# Patient Record
Sex: FEMALE | Race: WHITE | NOT HISPANIC OR LATINO | Employment: FULL TIME | URBAN - METROPOLITAN AREA
[De-identification: names, ages, dates, MRNs, and addresses within clinical notes are randomized per-mention and may not be internally consistent; named-entity substitution may affect disease eponyms.]

---

## 2023-03-07 ENCOUNTER — CLINICAL SUPPORT (OUTPATIENT)
Dept: PHARMACY | Facility: HOSPITAL | Age: 63
End: 2023-03-07
Payer: COMMERCIAL

## 2023-03-07 DIAGNOSIS — E11.9 DIABETES MELLITUS TYPE 2 WITHOUT RETINOPATHY (MULTI): Primary | ICD-10-CM

## 2023-03-07 PROBLEM — G25.81 RESTLESS LEGS SYNDROME: Status: ACTIVE | Noted: 2023-03-07

## 2023-03-07 PROBLEM — D83.9 COMMON VARIABLE IMMUNODEFICIENCY (MULTI): Status: ACTIVE | Noted: 2023-03-07

## 2023-03-07 PROBLEM — J32.9 CHRONIC SINUSITIS: Status: ACTIVE | Noted: 2023-03-07

## 2023-03-07 PROBLEM — R92.8 ABNORMAL MAMMOGRAM: Status: ACTIVE | Noted: 2023-03-07

## 2023-03-07 PROBLEM — G51.32 HEMIFACIAL SPASM OF LEFT SIDE OF FACE: Status: ACTIVE | Noted: 2023-03-07

## 2023-03-07 PROBLEM — H52.4 BILATERAL PRESBYOPIA: Status: ACTIVE | Noted: 2023-03-07

## 2023-03-07 PROBLEM — R42 DIZZINESS: Status: ACTIVE | Noted: 2023-03-07

## 2023-03-07 PROBLEM — M19.90 ARTHRITIS: Status: ACTIVE | Noted: 2023-03-07

## 2023-03-07 PROBLEM — D23.30 BENIGN NEOPLASM OF SKIN OF FACE: Status: ACTIVE | Noted: 2023-03-07

## 2023-03-07 PROBLEM — G93.32 CHRONIC FATIGUE SYNDROME: Status: ACTIVE | Noted: 2023-03-07

## 2023-03-07 PROBLEM — E53.8 VITAMIN B12 DEFICIENCY: Status: ACTIVE | Noted: 2023-03-07

## 2023-03-07 PROBLEM — Z79.52 CURRENT CHRONIC USE OF SYSTEMIC STEROIDS: Status: ACTIVE | Noted: 2023-03-07

## 2023-03-07 PROBLEM — E55.9 VITAMIN D DEFICIENCY, UNSPECIFIED: Status: ACTIVE | Noted: 2023-03-07

## 2023-03-07 PROBLEM — H52.203 ASTIGMATISM, BILATERAL: Status: ACTIVE | Noted: 2023-03-07

## 2023-03-07 PROBLEM — H25.13 NUCLEAR SCLEROSIS OF BOTH EYES: Status: ACTIVE | Noted: 2023-03-07

## 2023-03-07 PROBLEM — N63.0 LUMP OR MASS IN BREAST: Status: ACTIVE | Noted: 2023-03-07

## 2023-03-07 PROBLEM — H93.13 BILATERAL TINNITUS: Status: ACTIVE | Noted: 2023-03-07

## 2023-03-07 PROBLEM — Z98.84 BARIATRIC SURGERY STATUS: Status: ACTIVE | Noted: 2023-03-07

## 2023-03-07 PROBLEM — K21.9 GERD (GASTROESOPHAGEAL REFLUX DISEASE): Status: ACTIVE | Noted: 2023-03-07

## 2023-03-07 PROBLEM — J45.909 ASTHMA (HHS-HCC): Status: ACTIVE | Noted: 2023-03-07

## 2023-03-07 PROBLEM — R73.9 HYPERGLYCEMIA, DRUG-INDUCED: Status: ACTIVE | Noted: 2023-03-07

## 2023-03-07 PROBLEM — E03.9 HYPOTHYROID: Status: ACTIVE | Noted: 2023-03-07

## 2023-03-07 PROBLEM — E78.2 COMBINED HYPERLIPIDEMIA: Status: ACTIVE | Noted: 2023-03-07

## 2023-03-07 PROBLEM — H02.831 DERMATOCHALASIS OF RIGHT UPPER EYELID: Status: ACTIVE | Noted: 2023-03-07

## 2023-03-07 PROBLEM — G47.30 SLEEP APNEA: Status: ACTIVE | Noted: 2023-03-07

## 2023-03-07 PROBLEM — H90.3 BILATERAL SENSORINEURAL HEARING LOSS: Status: ACTIVE | Noted: 2023-03-07

## 2023-03-07 PROBLEM — T50.905A HYPERGLYCEMIA, DRUG-INDUCED: Status: ACTIVE | Noted: 2023-03-07

## 2023-03-07 PROBLEM — R01.1 HEART MURMUR: Status: ACTIVE | Noted: 2023-03-07

## 2023-03-07 PROBLEM — G47.00 INSOMNIA: Status: ACTIVE | Noted: 2023-03-07

## 2023-03-07 RX ORDER — PEN NEEDLE, DIABETIC 32GX 5/32"
1 NEEDLE, DISPOSABLE MISCELLANEOUS
COMMUNITY
Start: 2022-12-29 | End: 2023-05-23 | Stop reason: SDUPTHER

## 2023-03-07 RX ORDER — IMMUNE GLOBULIN (HUMAN) 10 G/100ML
0.6 INJECTION INTRAVENOUS; SUBCUTANEOUS
COMMUNITY
Start: 2023-02-15 | End: 2023-05-23 | Stop reason: SINTOL

## 2023-03-07 RX ORDER — FLUTICASONE FUROATE, UMECLIDINIUM BROMIDE AND VILANTEROL TRIFENATATE 200; 62.5; 25 UG/1; UG/1; UG/1
1 POWDER RESPIRATORY (INHALATION) DAILY
COMMUNITY
End: 2024-02-07 | Stop reason: SDUPTHER

## 2023-03-07 RX ORDER — EPINEPHRINE 0.3 MG/.3ML
1 INJECTION SUBCUTANEOUS AS NEEDED
COMMUNITY
Start: 2016-08-31 | End: 2024-02-07 | Stop reason: WASHOUT

## 2023-03-07 RX ORDER — METFORMIN HYDROCHLORIDE 1000 MG/1
1000 TABLET ORAL
COMMUNITY
Start: 2022-03-21 | End: 2023-05-23 | Stop reason: SDUPTHER

## 2023-03-07 RX ORDER — OLOPATADINE HYDROCHLORIDE 2 MG/ML
1 SOLUTION/ DROPS OPHTHALMIC DAILY PRN
COMMUNITY
Start: 2021-05-15 | End: 2024-02-07 | Stop reason: WASHOUT

## 2023-03-07 RX ORDER — CHOLECALCIFEROL (VITAMIN D3) 25 MCG
25 TABLET ORAL DAILY
COMMUNITY
Start: 2017-08-12

## 2023-03-07 RX ORDER — DULAGLUTIDE 1.5 MG/.5ML
1.5 INJECTION, SOLUTION SUBCUTANEOUS
COMMUNITY
Start: 2022-03-21 | End: 2023-05-23 | Stop reason: ALTCHOICE

## 2023-03-07 RX ORDER — LEVOTHYROXINE SODIUM 150 UG/1
150 TABLET ORAL DAILY
COMMUNITY
Start: 2015-03-21 | End: 2023-05-23 | Stop reason: SDUPTHER

## 2023-03-07 RX ORDER — ALBUTEROL SULFATE 90 UG/1
1-2 AEROSOL, METERED RESPIRATORY (INHALATION) EVERY 4 HOURS PRN
COMMUNITY
Start: 2014-09-12 | End: 2024-02-07 | Stop reason: SDUPTHER

## 2023-03-07 RX ORDER — BLOOD SUGAR DIAGNOSTIC
1 STRIP MISCELLANEOUS 3 TIMES DAILY
COMMUNITY
Start: 2022-03-21 | End: 2023-05-24 | Stop reason: SDUPTHER

## 2023-03-07 RX ORDER — MEPOLIZUMAB 100 MG/ML
100 INJECTION, SOLUTION SUBCUTANEOUS
COMMUNITY
Start: 2023-02-10 | End: 2024-02-07 | Stop reason: WASHOUT

## 2023-03-07 RX ORDER — GABAPENTIN 100 MG/1
100 CAPSULE ORAL 3 TIMES DAILY
COMMUNITY
Start: 2021-06-03 | End: 2024-01-24 | Stop reason: WASHOUT

## 2023-03-07 RX ORDER — INSULIN ASPART 100 [IU]/ML
6-18 INJECTION, SOLUTION INTRAVENOUS; SUBCUTANEOUS
COMMUNITY
Start: 2020-06-29 | End: 2023-05-23 | Stop reason: SDUPTHER

## 2023-03-07 RX ORDER — TACROLIMUS 0.3 MG/G
1 OINTMENT TOPICAL 2 TIMES DAILY PRN
COMMUNITY
Start: 2021-05-15 | End: 2023-05-23 | Stop reason: ALTCHOICE

## 2023-03-07 RX ORDER — ONABOTULINUMTOXINA 100 [USP'U]/1
28 INJECTION, POWDER, LYOPHILIZED, FOR SOLUTION INTRADERMAL; INTRAMUSCULAR
COMMUNITY
Start: 2022-03-16 | End: 2024-02-07 | Stop reason: SDUPTHER

## 2023-03-07 RX ORDER — ESOMEPRAZOLE MAGNESIUM 40 MG/1
40 CAPSULE, DELAYED RELEASE ORAL
COMMUNITY
Start: 2016-01-18 | End: 2023-05-23 | Stop reason: SDUPTHER

## 2023-03-07 RX ORDER — IBUPROFEN 800 MG/1
800 TABLET ORAL 2 TIMES DAILY PRN
COMMUNITY
Start: 2014-02-04 | End: 2024-03-18 | Stop reason: SDUPTHER

## 2023-03-07 ASSESSMENT — ENCOUNTER SYMPTOMS: POLYDIPSIA: 1

## 2023-05-02 ENCOUNTER — PATIENT OUTREACH (OUTPATIENT)
Dept: CARE COORDINATION | Facility: CLINIC | Age: 63
End: 2023-05-02
Payer: COMMERCIAL

## 2023-05-10 LAB
ANION GAP IN SER/PLAS: 16 MMOL/L (ref 10–20)
CALCIUM (MG/DL) IN SER/PLAS: 9.1 MG/DL (ref 8.6–10.3)
CARBON DIOXIDE, TOTAL (MMOL/L) IN SER/PLAS: 22 MMOL/L (ref 21–32)
CHLORIDE (MMOL/L) IN SER/PLAS: 103 MMOL/L (ref 98–107)
CREATININE (MG/DL) IN SER/PLAS: 0.75 MG/DL (ref 0.5–1.05)
D-DIMER, QUANTITATIVE VTE EXCLUSION: 512 NG/ML FEU
GFR FEMALE: 88 ML/MIN/1.73M2
GLUCOSE (MG/DL) IN SER/PLAS: 117 MG/DL (ref 74–99)
POTASSIUM (MMOL/L) IN SER/PLAS: 4.1 MMOL/L (ref 3.5–5.3)
SODIUM (MMOL/L) IN SER/PLAS: 137 MMOL/L (ref 136–145)
UREA NITROGEN (MG/DL) IN SER/PLAS: 12 MG/DL (ref 6–23)

## 2023-05-15 ENCOUNTER — APPOINTMENT (OUTPATIENT)
Dept: PRIMARY CARE | Facility: CLINIC | Age: 63
End: 2023-05-15
Payer: COMMERCIAL

## 2023-05-23 ENCOUNTER — OFFICE VISIT (OUTPATIENT)
Dept: PRIMARY CARE | Facility: CLINIC | Age: 63
End: 2023-05-23
Payer: COMMERCIAL

## 2023-05-23 VITALS
HEIGHT: 67 IN | WEIGHT: 293 LBS | DIASTOLIC BLOOD PRESSURE: 72 MMHG | BODY MASS INDEX: 45.99 KG/M2 | HEART RATE: 82 BPM | TEMPERATURE: 97.5 F | OXYGEN SATURATION: 95 % | SYSTOLIC BLOOD PRESSURE: 122 MMHG

## 2023-05-23 DIAGNOSIS — E11.9 DIABETES MELLITUS TYPE 2 WITHOUT RETINOPATHY (MULTI): Primary | ICD-10-CM

## 2023-05-23 DIAGNOSIS — R06.00 DYSPNEA, UNSPECIFIED TYPE: ICD-10-CM

## 2023-05-23 DIAGNOSIS — E06.3 HYPOTHYROIDISM DUE TO HASHIMOTO'S THYROIDITIS: ICD-10-CM

## 2023-05-23 DIAGNOSIS — E03.8 HYPOTHYROIDISM DUE TO HASHIMOTO'S THYROIDITIS: ICD-10-CM

## 2023-05-23 DIAGNOSIS — K21.9 GASTROESOPHAGEAL REFLUX DISEASE WITHOUT ESOPHAGITIS: ICD-10-CM

## 2023-05-23 DIAGNOSIS — E66.01 CLASS 3 SEVERE OBESITY DUE TO EXCESS CALORIES WITH SERIOUS COMORBIDITY AND BODY MASS INDEX (BMI) OF 45.0 TO 49.9 IN ADULT (MULTI): ICD-10-CM

## 2023-05-23 PROBLEM — E66.813 CLASS 3 SEVERE OBESITY DUE TO EXCESS CALORIES WITH SERIOUS COMORBIDITY AND BODY MASS INDEX (BMI) OF 45.0 TO 49.9 IN ADULT: Status: ACTIVE | Noted: 2023-05-23

## 2023-05-23 LAB — THYROTROPIN (MIU/L) IN SER/PLAS BY DETECTION LIMIT <= 0.05 MIU/L: 3.77 MIU/L (ref 0.44–3.98)

## 2023-05-23 PROCEDURE — 3078F DIAST BP <80 MM HG: CPT | Performed by: INTERNAL MEDICINE

## 2023-05-23 PROCEDURE — 3008F BODY MASS INDEX DOCD: CPT | Performed by: INTERNAL MEDICINE

## 2023-05-23 PROCEDURE — 84443 ASSAY THYROID STIM HORMONE: CPT

## 2023-05-23 PROCEDURE — 3044F HG A1C LEVEL LT 7.0%: CPT | Performed by: INTERNAL MEDICINE

## 2023-05-23 PROCEDURE — 3074F SYST BP LT 130 MM HG: CPT | Performed by: INTERNAL MEDICINE

## 2023-05-23 PROCEDURE — 1159F MED LIST DOCD IN RCRD: CPT | Performed by: INTERNAL MEDICINE

## 2023-05-23 PROCEDURE — 99214 OFFICE O/P EST MOD 30 MIN: CPT | Performed by: INTERNAL MEDICINE

## 2023-05-23 RX ORDER — MONTELUKAST SODIUM 10 MG/1
TABLET ORAL
COMMUNITY
Start: 2023-03-13 | End: 2024-01-24 | Stop reason: SDUPTHER

## 2023-05-23 RX ORDER — PEN NEEDLE, DIABETIC 32GX 5/32"
1 NEEDLE, DISPOSABLE MISCELLANEOUS
Qty: 100 EACH | Refills: 1 | Status: SHIPPED | OUTPATIENT
Start: 2023-05-23 | End: 2023-05-25 | Stop reason: SDUPTHER

## 2023-05-23 RX ORDER — SULFAMETHOXAZOLE AND TRIMETHOPRIM 800; 160 MG/1; MG/1
TABLET ORAL
COMMUNITY
Start: 2023-04-28 | End: 2024-01-24 | Stop reason: SDUPTHER

## 2023-05-23 RX ORDER — METFORMIN HYDROCHLORIDE 1000 MG/1
1000 TABLET ORAL
Qty: 180 TABLET | Refills: 3 | Status: SHIPPED | OUTPATIENT
Start: 2023-05-23 | End: 2023-05-23

## 2023-05-23 RX ORDER — TIRZEPATIDE 2.5 MG/.5ML
2.5 INJECTION, SOLUTION SUBCUTANEOUS
Qty: 2 ML | Refills: 0 | Status: SHIPPED | OUTPATIENT
Start: 2023-05-23 | End: 2023-06-15 | Stop reason: ALTCHOICE

## 2023-05-23 RX ORDER — RIZATRIPTAN BENZOATE 10 MG/1
TABLET ORAL
COMMUNITY
Start: 2022-12-02 | End: 2024-02-07 | Stop reason: WASHOUT

## 2023-05-23 RX ORDER — GLUCOSAM/CHONDRO/HERB 149/HYAL 750-100 MG
TABLET ORAL
COMMUNITY
End: 2024-01-24 | Stop reason: SDUPTHER

## 2023-05-23 RX ORDER — AZELASTINE 1 MG/ML
SPRAY, METERED NASAL
COMMUNITY
Start: 2023-04-24 | End: 2023-11-09 | Stop reason: SDUPTHER

## 2023-05-23 RX ORDER — LEVOTHYROXINE SODIUM 150 UG/1
150 TABLET ORAL DAILY
Qty: 90 TABLET | Refills: 3 | Status: SHIPPED | OUTPATIENT
Start: 2023-05-23 | End: 2024-02-07 | Stop reason: WASHOUT

## 2023-05-23 RX ORDER — ESOMEPRAZOLE MAGNESIUM 40 MG/1
40 CAPSULE, DELAYED RELEASE ORAL
Qty: 90 CAPSULE | Refills: 3 | Status: SHIPPED | OUTPATIENT
Start: 2023-05-23 | End: 2023-05-23

## 2023-05-23 RX ORDER — INSULIN ASPART 100 [IU]/ML
6-18 INJECTION, SOLUTION INTRAVENOUS; SUBCUTANEOUS
Qty: 10 ML | Refills: 3 | Status: SHIPPED | OUTPATIENT
Start: 2023-05-23 | End: 2023-05-23

## 2023-05-23 NOTE — PROGRESS NOTES
Subjective   Lara Morris is a 63 y.o. female who presents for Hospital Follow-up.  HPI  Last seen 4/29/2021    Past medical history includes CVID, obesity, hypothyroidism, asthma, hypertension, osteoarthritis and diabetes.    Interim:  -Admitted to [facility], 4/28/2023 through 5/1/2023 due to anaphylaxis from IVIG  -Dealing with persistent dyspnea since approximately June 2021, possibly worsened after RSV about, treated by her immunologist for asthma increasing her medications but without improvement.  Seen by Dr. Wilkinson 5/10/2023 who ordered several tests including lab work, CT PE study which was negative but did show elevated right hemidiaphragm, sniff test suggesting possible diaphragmatic weakness.  She is still pending TTE, PFTs and 6-minute walk test.  Was also referred to Dr. Torres for evaluation of diaphragmatic weakness.    Refer to initial visit regarding diagnosis of diabetes; diagnosed by her immunologist, Dr. Emilie Richey. She has CVID on IVIG; since around 2010. Due to side effects, she requires pretreatment steroids (Solumedrol) along with Benadryl and Zofran to tolerate the infusions.  Recent anaphylactic reaction, unclear with the plan moving forward will be.    Has significant hyperglycemia with steroids, she has been doing well with corrective scale insulin for 2 or 3 days after treatment every 3 weeks. Most she has needed his 8 units.  Initial hemoglobin A1c was over 9%, started on metformin at first visit, tolerating well.   We also started her on statin for hyperlipidemia but atorvastatin 20 mg was not tolerated due to severe cramping and spasms in the legs, nor was rosuvastatin 5 mg even at every other day dosing.  She has gained 20 pounds since last visit.    **COPIED FORWARD FOR REFERENCE**     She also reports experiencing abdominal pain. She has had evaluation with gastroenterology in the past. Alternating bowel habits. Some lower abdominal discomfort especially with constipation.  "Has also had epigastric discomfort which is better off of NSAIDs. History of inguinal and ventral hernia repairs, as well as sigmoid diverticulitis. There has been question of Crohn's disease due to ileitis on endoscopy. Dr. Swenson suspected it may have been related to CVID rather than IBD. EGD and colonoscopy the 04/24/2019 were unremarkable other than gastropathy and TA and HPs. Repeat colonoscopy in 3 years due to poor prep. Recommended to perform capsule endoscopy, not completed. Pain with BM possibly due to severe sigmoid diverticulosis, aggressive constipation treatment was discussed at initial visit and symptoms have improved.     She lives with her partner Comfort and 2 dogs. She does have 2 daughters.  She works for  [employer].   Nonsmoker, no drug use, social alcohol use.     Providers:  Immunology-Dr. Emilie Richey  Rheumatology-Dr. Estrada  GI- Dr. Tiffany Love (no longer with )   Pulmonary: Dr. Gorge Wilkinson  Ophthalmology: Dr. Hanson  Neurology: Dr. Richie Tristan    Objective   /72   Pulse 82   Temp 36.4 °C (97.5 °F)   Ht 1.702 m (5' 7\")   Wt 136 kg (299 lb)   SpO2 95%   BMI 46.83 kg/m²    Physical Exam  Gen: NAD, pleasant, A&;Ox3  HEENT: PERRL, EOMI, MMM, OP clear  Neck: supple, no thyromegaly, no JVD, normal carotid upstroke  Pulm: lungs CTAB, no wheezes, slightly decreased air movement  CV: RRR, holosystolic murmur, midsternal, prominent P2, 2+ DP pulses  Abd: NABS, soft, NT, ND no HSM  Ext: no peripheral edema  Neuro: CN II-XII intact, no focal sensory or motor deficits, normal reflexes     Assessment/Plan   Problem List Items Addressed This Visit    None  Holosystolic murmur: Chronic, TTE 9/4/2020 shows normal EF, normal valve function, atrial septal aneurysm, pending repeat     Metabolic syndrome: NIDDM/HTN/HLD/Obesity  - Continue metformin 1000 mg twice daily  -Trial of Mounjaro  -Sliding-scale insulin for glucose control with use of steroids  -Statin not tolerated even " at very low dose (rosuva 5mg QOD); monitor closely, consider Zetia, PCSK9 inhibitors, etc.  -Blood pressure is controlled off lisinopril, even small dose causing symptoms of hypotension  -Recommend annual podiatry and eye exam    Hypothyroidism: Continues on levothyroxine, no TSH checked since last visit over 2 years ago, recheck today    Dyspnea: In the process of work-up as per HPI     CVID and asthma: managed by Dr. Emilie Richey as per history of present illness    **COPIED FORWARD FOR REFERENCE**     Abdominal discomfort: History of gastritis, chronic NSAID use, abdominal surgery and diverticulosis; likely all contributing, continue current regimen and aggressive control of constipation     Health maintenance  -Mammogram: 9/1/2020  -Pap: History of hysterectomy, unclear if indicated  -DEXA: If not done should check baseline given steroid use  -Last colonoscopy: 04/22/2019 , tubular adenoma Repeat: 3 years (poor prep)  -Smoking history: Never  -Counseled regarding diet and exercise  -Immunizations: Per Dr. Emilie Richey  -Followup in 3-6 months, sooner if needed         Chan Hernandez MD

## 2023-05-24 ENCOUNTER — PATIENT MESSAGE (OUTPATIENT)
Dept: PRIMARY CARE | Facility: CLINIC | Age: 63
End: 2023-05-24
Payer: COMMERCIAL

## 2023-05-24 DIAGNOSIS — E11.9 DIABETES MELLITUS TYPE 2 WITHOUT RETINOPATHY (MULTI): Primary | ICD-10-CM

## 2023-05-24 RX ORDER — BLOOD SUGAR DIAGNOSTIC
1 STRIP MISCELLANEOUS 3 TIMES DAILY
Qty: 100 STRIP | Refills: 3 | Status: SHIPPED | OUTPATIENT
Start: 2023-05-24 | End: 2023-05-25 | Stop reason: SDUPTHER

## 2023-05-25 DIAGNOSIS — E11.9 DIABETES MELLITUS TYPE 2 WITHOUT RETINOPATHY (MULTI): ICD-10-CM

## 2023-05-25 RX ORDER — BLOOD SUGAR DIAGNOSTIC
1 STRIP MISCELLANEOUS 3 TIMES DAILY
Qty: 300 STRIP | Refills: 3 | Status: SHIPPED | OUTPATIENT
Start: 2023-05-25 | End: 2024-02-07 | Stop reason: SDUPTHER

## 2023-05-25 RX ORDER — PEN NEEDLE, DIABETIC 32GX 5/32"
1 NEEDLE, DISPOSABLE MISCELLANEOUS
Qty: 300 EACH | Refills: 3 | Status: SHIPPED | OUTPATIENT
Start: 2023-05-25 | End: 2024-02-07 | Stop reason: SDUPTHER

## 2023-06-15 ENCOUNTER — TELEPHONE (OUTPATIENT)
Dept: PRIMARY CARE | Facility: CLINIC | Age: 63
End: 2023-06-15
Payer: COMMERCIAL

## 2023-06-15 DIAGNOSIS — E11.9 DIABETES MELLITUS TYPE 2 WITHOUT RETINOPATHY (MULTI): Primary | ICD-10-CM

## 2023-06-15 RX ORDER — TIRZEPATIDE 5 MG/.5ML
5 INJECTION, SOLUTION SUBCUTANEOUS
Qty: 2 ML | Refills: 3 | Status: SHIPPED | OUTPATIENT
Start: 2023-06-15 | End: 2023-08-23 | Stop reason: ALTCHOICE

## 2023-06-15 NOTE — TELEPHONE ENCOUNTER
Pt has 1 injection left of the Mounjaro 2.5mg. She is asking if you can send in a new rx for the next dosage

## 2023-06-21 ENCOUNTER — PATIENT OUTREACH (OUTPATIENT)
Dept: CARE COORDINATION | Facility: CLINIC | Age: 63
End: 2023-06-21
Payer: COMMERCIAL

## 2023-07-21 LAB
ALANINE AMINOTRANSFERASE (SGPT) (U/L) IN SER/PLAS: 31 U/L (ref 7–45)
ALBUMIN (G/DL) IN SER/PLAS: 4.1 G/DL (ref 3.4–5)
ALKALINE PHOSPHATASE (U/L) IN SER/PLAS: 76 U/L (ref 33–136)
ANION GAP IN SER/PLAS: 15 MMOL/L (ref 10–20)
ASPARTATE AMINOTRANSFERASE (SGOT) (U/L) IN SER/PLAS: 27 U/L (ref 9–39)
BASOPHILS (10*3/UL) IN BLOOD BY AUTOMATED COUNT: 0.05 X10E9/L (ref 0–0.1)
BASOPHILS/100 LEUKOCYTES IN BLOOD BY AUTOMATED COUNT: 0.6 % (ref 0–2)
BILIRUBIN TOTAL (MG/DL) IN SER/PLAS: 0.5 MG/DL (ref 0–1.2)
CALCIUM (MG/DL) IN SER/PLAS: 9.4 MG/DL (ref 8.6–10.3)
CARBON DIOXIDE, TOTAL (MMOL/L) IN SER/PLAS: 23 MMOL/L (ref 21–32)
CHLORIDE (MMOL/L) IN SER/PLAS: 105 MMOL/L (ref 98–107)
CREATININE (MG/DL) IN SER/PLAS: 0.8 MG/DL (ref 0.5–1.05)
EOSINOPHILS (10*3/UL) IN BLOOD BY AUTOMATED COUNT: 0.09 X10E9/L (ref 0–0.7)
EOSINOPHILS/100 LEUKOCYTES IN BLOOD BY AUTOMATED COUNT: 1.1 % (ref 0–6)
ERYTHROCYTE DISTRIBUTION WIDTH (RATIO) BY AUTOMATED COUNT: 14.6 % (ref 11.5–14.5)
ERYTHROCYTE MEAN CORPUSCULAR HEMOGLOBIN CONCENTRATION (G/DL) BY AUTOMATED: 31.8 G/DL (ref 32–36)
ERYTHROCYTE MEAN CORPUSCULAR VOLUME (FL) BY AUTOMATED COUNT: 83 FL (ref 80–100)
ERYTHROCYTES (10*6/UL) IN BLOOD BY AUTOMATED COUNT: 4.38 X10E12/L (ref 4–5.2)
GFR FEMALE: 82 ML/MIN/1.73M2
GLUCOSE (MG/DL) IN SER/PLAS: 83 MG/DL (ref 74–99)
HEMATOCRIT (%) IN BLOOD BY AUTOMATED COUNT: 36.2 % (ref 36–46)
HEMOGLOBIN (G/DL) IN BLOOD: 11.5 G/DL (ref 12–16)
IMMATURE GRANULOCYTES/100 LEUKOCYTES IN BLOOD BY AUTOMATED COUNT: 0.4 % (ref 0–0.9)
LEUKOCYTES (10*3/UL) IN BLOOD BY AUTOMATED COUNT: 8.5 X10E9/L (ref 4.4–11.3)
LYMPHOCYTES (10*3/UL) IN BLOOD BY AUTOMATED COUNT: 1.97 X10E9/L (ref 1.2–4.8)
LYMPHOCYTES/100 LEUKOCYTES IN BLOOD BY AUTOMATED COUNT: 23.3 % (ref 13–44)
MONOCYTES (10*3/UL) IN BLOOD BY AUTOMATED COUNT: 0.49 X10E9/L (ref 0.1–1)
MONOCYTES/100 LEUKOCYTES IN BLOOD BY AUTOMATED COUNT: 5.8 % (ref 2–10)
NEUTROPHILS (10*3/UL) IN BLOOD BY AUTOMATED COUNT: 5.84 X10E9/L (ref 1.2–7.7)
NEUTROPHILS/100 LEUKOCYTES IN BLOOD BY AUTOMATED COUNT: 68.8 % (ref 40–80)
PLATELETS (10*3/UL) IN BLOOD AUTOMATED COUNT: 300 X10E9/L (ref 150–450)
POTASSIUM (MMOL/L) IN SER/PLAS: 4.1 MMOL/L (ref 3.5–5.3)
PROTEIN TOTAL: 6.6 G/DL (ref 6.4–8.2)
SEDIMENTATION RATE, ERYTHROCYTE: 33 MM/H (ref 0–30)
SODIUM (MMOL/L) IN SER/PLAS: 139 MMOL/L (ref 136–145)
UREA NITROGEN (MG/DL) IN SER/PLAS: 15 MG/DL (ref 6–23)

## 2023-07-22 LAB
IGA (MG/DL) IN SER/PLAS: 146 MG/DL (ref 70–400)
IGG (MG/DL) IN SER/PLAS: 551 MG/DL (ref 700–1600)
IGM (MG/DL) IN SER/PLAS: 152 MG/DL (ref 40–230)

## 2023-08-09 LAB
FERRITIN (UG/LL) IN SER/PLAS: 63 UG/L (ref 8–150)
IRON (UG/DL) IN SER/PLAS: 58 UG/DL (ref 35–150)
IRON BINDING CAPACITY (UG/DL) IN SER/PLAS: 459 UG/DL (ref 240–445)
IRON SATURATION (%) IN SER/PLAS: 13 % (ref 25–45)

## 2023-08-23 ENCOUNTER — OFFICE VISIT (OUTPATIENT)
Dept: PRIMARY CARE | Facility: CLINIC | Age: 63
End: 2023-08-23
Payer: COMMERCIAL

## 2023-08-23 VITALS
OXYGEN SATURATION: 98 % | DIASTOLIC BLOOD PRESSURE: 67 MMHG | WEIGHT: 267 LBS | HEIGHT: 67 IN | BODY MASS INDEX: 41.91 KG/M2 | TEMPERATURE: 97.9 F | HEART RATE: 78 BPM | SYSTOLIC BLOOD PRESSURE: 122 MMHG

## 2023-08-23 DIAGNOSIS — E11.9 DIABETES MELLITUS TYPE 2 WITHOUT RETINOPATHY (MULTI): ICD-10-CM

## 2023-08-23 DIAGNOSIS — G47.33 OBSTRUCTIVE SLEEP APNEA SYNDROME: Primary | ICD-10-CM

## 2023-08-23 DIAGNOSIS — J06.9 UPPER RESPIRATORY TRACT INFECTION, UNSPECIFIED TYPE: ICD-10-CM

## 2023-08-23 DIAGNOSIS — E66.01 CLASS 3 SEVERE OBESITY DUE TO EXCESS CALORIES WITH SERIOUS COMORBIDITY AND BODY MASS INDEX (BMI) OF 45.0 TO 49.9 IN ADULT (MULTI): ICD-10-CM

## 2023-08-23 PROBLEM — I35.0 AORTIC STENOSIS: Status: ACTIVE | Noted: 2023-08-23

## 2023-08-23 PROBLEM — J98.6 DIAPHRAGM DYSFUNCTION: Status: ACTIVE | Noted: 2023-08-23

## 2023-08-23 PROBLEM — J96.10 CHRONIC RESPIRATORY FAILURE (MULTI): Status: ACTIVE | Noted: 2023-08-23

## 2023-08-23 PROBLEM — R10.84 GENERALIZED ABDOMINAL PAIN: Status: ACTIVE | Noted: 2023-08-23

## 2023-08-23 PROCEDURE — 99215 OFFICE O/P EST HI 40 MIN: CPT | Performed by: INTERNAL MEDICINE

## 2023-08-23 PROCEDURE — 3078F DIAST BP <80 MM HG: CPT | Performed by: INTERNAL MEDICINE

## 2023-08-23 PROCEDURE — 3008F BODY MASS INDEX DOCD: CPT | Performed by: INTERNAL MEDICINE

## 2023-08-23 PROCEDURE — 1159F MED LIST DOCD IN RCRD: CPT | Performed by: INTERNAL MEDICINE

## 2023-08-23 PROCEDURE — 3044F HG A1C LEVEL LT 7.0%: CPT | Performed by: INTERNAL MEDICINE

## 2023-08-23 PROCEDURE — 1126F AMNT PAIN NOTED NONE PRSNT: CPT | Performed by: INTERNAL MEDICINE

## 2023-08-23 PROCEDURE — 3074F SYST BP LT 130 MM HG: CPT | Performed by: INTERNAL MEDICINE

## 2023-08-23 RX ORDER — CETIRIZINE HYDROCHLORIDE 10 MG/1
1 TABLET ORAL 2 TIMES DAILY
COMMUNITY
Start: 2023-06-29

## 2023-08-23 RX ORDER — AMOXICILLIN AND CLAVULANATE POTASSIUM 875; 125 MG/1; MG/1
875 TABLET, FILM COATED ORAL 2 TIMES DAILY
Qty: 28 TABLET | Refills: 0 | Status: SHIPPED | OUTPATIENT
Start: 2023-08-23 | End: 2023-09-06

## 2023-08-23 RX ORDER — EZETIMIBE 10 MG/1
1 TABLET ORAL DAILY
COMMUNITY
Start: 2022-04-20 | End: 2024-05-30 | Stop reason: SDUPTHER

## 2023-08-23 RX ORDER — PREDNISONE 10 MG/1
TABLET ORAL
Qty: 30 TABLET | Refills: 0 | Status: SHIPPED | OUTPATIENT
Start: 2023-08-23 | End: 2023-09-02

## 2023-08-23 RX ORDER — HUMAN IMMUNOGLOBULIN G 0.2 G/ML
LIQUID SUBCUTANEOUS
COMMUNITY
Start: 2023-08-01 | End: 2024-06-06 | Stop reason: SDUPTHER

## 2023-08-23 RX ORDER — TIRZEPATIDE 7.5 MG/.5ML
7.5 INJECTION, SOLUTION SUBCUTANEOUS
Qty: 2 ML | Refills: 2 | Status: SHIPPED | OUTPATIENT
Start: 2023-08-23 | End: 2023-08-23

## 2023-08-23 NOTE — PROGRESS NOTES
"Subjective   Lara Morris is a 63 y.o. female who presents for Follow-up and Sinusitis.  HPI  Last seen 4/29/2021    Past medical history includes CVID, obesity, hypothyroidism, asthma, hypertension, osteoarthritis and diabetes.    Interim:  -Normal PFTs, May 2023  -TTE showing mild aortic stenosis and diastolic dysfunction, June 2023  -Bilateral diaphragmatic pacing implantation, 6/19/2023  -Cardiology follow-up with Dr. Craft on 6/29/2023, recommended repeat echocardiogram in 1 year for aortic stenosis, restarting Zetia daily  -Postoperative follow-up 6/29/2023, apparently doing well  - Rheum follow-up, 8/8/23, given local injection triamcinolone and ordered plain films of the spine and hips  -Sleep medicine 8/9/2023, ordered home sleep study to confirm prior diagnosis and restart on CPAP.  Also assessed for RLS with recommendation to take Neurontin earlier and possibly increased dose as well as address iron levels if needed.    The diaphragmatic pacemaker has been \"life-altering\"; her exertional capacity has improved significantly.    Sick since Friday; runny nose, congestion, cough.  No fever.  No sick contacts.  Using OTC decongestant, expectorant in addition to her daily Zyrtec and Singulair.    Initial A1c > 9%, started on Metformin and CSI (for steroid coverage).  On Mounjaro since May 2023, no side effects.  Has really helped with controlling hunger.  Able to limit carbs more easily.  Has lost over 30lbs in 3 months!    We also started her on statin for hyperlipidemia but atorvastatin 20 mg was not tolerated due to severe cramping and spasms in the legs, nor was rosuvastatin 5 mg even at every other day dosing.  Started on Zetia and tolerating without side effects.         She lives with her partner Comfort and 2 dogs. She does have 2 daughters.  She works for  [employer].   Nonsmoker, no drug use, social alcohol use.     Providers:  Immunology-Dr. Emilie Richey  Surgery - Dr. Torres  Rheumatology-Dr. Ceballos " "Fabian  GI- Dr. Tiffany Love (no longer with )   Pulmonary: Dr. Gorge Wilkinson  Ophthalmology: Dr. Hanson  Neurology: Dr. Richie Tristan  Sleep: Dr. Wong    Objective   /67   Pulse 78   Temp 36.6 °C (97.9 °F)   Ht 1.702 m (5' 7\")   Wt 121 kg (267 lb)   SpO2 98%   BMI 41.82 kg/m²    Physical Exam  Gen: NAD, pleasant, A&;Ox3  HEENT: PERRL, EOMI, MMM, OP clear, TM clear  Neck: supple, no thyromegaly, no JVD, normal carotid upstroke  Pulm: lungs CTAB, mild basilar inspiratory wheezes especially at RLL  CV: RRR, early systolic murmur, midsternal, prominent P2, 2+ DP pulses  Abd: NABS, soft, NT, ND no HSM  Ext: no peripheral edema  Neuro: CN II-XII intact, no focal sensory or motor deficits, normal reflexes     Assessment/Plan     Acute URI: with immunosuppressed state (CVID); treat empirically with Augmentin for two weeks and steroid taper     Metabolic syndrome: NIDDM/HTN/HLD/Obesity  - Continue metformin 1000 mg twice daily  - increase Mounjaro to 7.5mg weekly (initial weight ~300lb)  -Sliding-scale insulin for glucose control with use of steroids  -Statin not tolerated even at very low dose (rosuva 5mg QOD); continue Zetia, recheck next visit  -Blood pressure is controlled off lisinopril, even small dose causing symptoms of hypotension  -Recommend annual podiatry and eye exam    Hypothyroidism:   -Continues on levothyroxine, 150mcg daily  - normal TSH 5/23/2023    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 Torres  - CTPE negative  - PFTs normal May 2023  - mild aortic stenosis and diastolic dysfunction, June 2023       Aortic stenosis, mild: repeat TTE in one year     CVID and asthma: managed by Dr. Emilie Richey, on Hizentra    JOSÉ MIGUEL/RLS: ordered HSAT as per sleep medicine, iron stores adequate      **COPIED FORWARD FOR REFERENCE**     Abdominal discomfort: History of gastritis, chronic NSAID use, abdominal surgery and diverticulosis; likely all contributing, " continue current regimen and aggressive control of constipation     Health maintenance  -Mammogram: 9/1/2020  -Pap: History of hysterectomy, unclear if indicated  -DEXA: If not done should check baseline given steroid use  -Last colonoscopy: 04/22/2019 , tubular adenoma Repeat: 3 years (poor prep)  -Smoking history: Never  -Counseled regarding diet and exercise  -Immunizations: Per Dr. Emilie Richey  -Followup in 3-6 months, sooner if needed         Chan Hernandez MD

## 2023-10-03 ENCOUNTER — PHARMACY VISIT (OUTPATIENT)
Dept: PHARMACY | Facility: CLINIC | Age: 63
End: 2023-10-03
Payer: COMMERCIAL

## 2023-10-03 PROCEDURE — RXMED WILLOW AMBULATORY MEDICATION CHARGE

## 2023-10-23 ENCOUNTER — PHARMACY VISIT (OUTPATIENT)
Dept: PHARMACY | Facility: CLINIC | Age: 63
End: 2023-10-23
Payer: COMMERCIAL

## 2023-10-23 PROCEDURE — RXMED WILLOW AMBULATORY MEDICATION CHARGE

## 2023-10-26 ENCOUNTER — PHARMACY VISIT (OUTPATIENT)
Dept: PHARMACY | Facility: CLINIC | Age: 63
End: 2023-10-26
Payer: COMMERCIAL

## 2023-10-26 PROCEDURE — RXMED WILLOW AMBULATORY MEDICATION CHARGE

## 2023-10-26 RX ORDER — PEN NEEDLE, DIABETIC 30 GX3/16"
1 NEEDLE, DISPOSABLE MISCELLANEOUS
Qty: 300 EACH | Refills: 3 | OUTPATIENT
Start: 2023-05-25

## 2023-10-26 RX ORDER — BLOOD SUGAR DIAGNOSTIC
1 STRIP MISCELLANEOUS 3 TIMES DAILY
Qty: 300 STRIP | Refills: 3 | OUTPATIENT
Start: 2023-10-26

## 2023-10-27 ENCOUNTER — SPECIALTY PHARMACY (OUTPATIENT)
Dept: PHARMACY | Facility: CLINIC | Age: 63
End: 2023-10-27

## 2023-10-27 ENCOUNTER — OFFICE VISIT (OUTPATIENT)
Dept: SURGERY | Facility: CLINIC | Age: 63
End: 2023-10-27
Payer: COMMERCIAL

## 2023-10-27 ENCOUNTER — PHARMACY VISIT (OUTPATIENT)
Dept: PHARMACY | Facility: CLINIC | Age: 63
End: 2023-10-27
Payer: COMMERCIAL

## 2023-10-27 ENCOUNTER — HOSPITAL ENCOUNTER (OUTPATIENT)
Dept: RADIOLOGY | Facility: HOSPITAL | Age: 63
Discharge: HOME | End: 2023-10-27
Payer: COMMERCIAL

## 2023-10-27 VITALS — HEART RATE: 76 BPM | DIASTOLIC BLOOD PRESSURE: 66 MMHG | SYSTOLIC BLOOD PRESSURE: 110 MMHG | TEMPERATURE: 96.5 F

## 2023-10-27 DIAGNOSIS — J98.6 DIAPHRAGM DYSFUNCTION: ICD-10-CM

## 2023-10-27 DIAGNOSIS — J98.6 DIAPHRAGM DYSFUNCTION: Primary | ICD-10-CM

## 2023-10-27 DIAGNOSIS — J31.0 CHRONIC RHINITIS: Primary | ICD-10-CM

## 2023-10-27 PROCEDURE — 1126F AMNT PAIN NOTED NONE PRSNT: CPT | Performed by: NURSE PRACTITIONER

## 2023-10-27 PROCEDURE — 71046 X-RAY EXAM CHEST 2 VIEWS: CPT | Performed by: RADIOLOGY

## 2023-10-27 PROCEDURE — RXMED WILLOW AMBULATORY MEDICATION CHARGE

## 2023-10-27 PROCEDURE — 99214 OFFICE O/P EST MOD 30 MIN: CPT | Performed by: NURSE PRACTITIONER

## 2023-10-27 PROCEDURE — 3074F SYST BP LT 130 MM HG: CPT | Performed by: NURSE PRACTITIONER

## 2023-10-27 PROCEDURE — 1159F MED LIST DOCD IN RCRD: CPT | Performed by: NURSE PRACTITIONER

## 2023-10-27 PROCEDURE — 1036F TOBACCO NON-USER: CPT | Performed by: NURSE PRACTITIONER

## 2023-10-27 PROCEDURE — 3044F HG A1C LEVEL LT 7.0%: CPT | Performed by: NURSE PRACTITIONER

## 2023-10-27 PROCEDURE — 3008F BODY MASS INDEX DOCD: CPT | Performed by: NURSE PRACTITIONER

## 2023-10-27 PROCEDURE — 71046 X-RAY EXAM CHEST 2 VIEWS: CPT | Mod: FY

## 2023-10-27 PROCEDURE — 3078F DIAST BP <80 MM HG: CPT | Performed by: NURSE PRACTITIONER

## 2023-10-27 PROCEDURE — 71046 X-RAY EXAM CHEST 2 VIEWS: CPT

## 2023-10-27 NOTE — PROGRESS NOTES
Subjective   Patient ID: Lara Morris is a 63 y.o. female.    HPI Lara is now 4 months post DP implant for right HD paralysis. Since implant she reports feeling significantly better with symptoms. She said she was able to ride her bike for several miles and is able to walk, etc. She is not yet at baseline but is much improved.   She has skin breakdown from tape over abd. The connector fung is not an issue it is tape she is using to secure the cable.     Review of Systems    Objective   Physical Exam  Constitutional:       Appearance: Normal appearance. She is obese.   Pulmonary:      Effort: Pulmonary effort is normal.   Abdominal:      Comments: Obese, wire sites intact, area or rash/breakdown on abd from tape. Superficial, no drainage, but skin red.    Skin:     General: Skin is warm and dry.   Neurological:      Mental Status: She is alert.         Assessment/Plan   63 yr old with right HD dysfunction who has been pacing full time since implant. She has some improvement on EMG since implant. The epochs are very very small with regular breathing, smaller than left with deep inspiration.   CXR done today and no significant change from pre op.   The pacer power was increased on the right.   She will continue to pace full time and we will see back in 4 months.     Diagnoses and all orders for this visit:  Diaphragm dysfunction  -     XR chest 2 views; Future

## 2023-10-31 PROBLEM — R06.00 DYSPNEA: Status: ACTIVE | Noted: 2023-10-31

## 2023-10-31 PROBLEM — M17.9 OSTEOARTHRITIS OF KNEE: Status: ACTIVE | Noted: 2023-10-31

## 2023-10-31 PROBLEM — M19.079 PRIMARY LOCALIZED OSTEOARTHROSIS OF ANKLE AND FOOT: Status: ACTIVE | Noted: 2020-01-23

## 2023-10-31 PROBLEM — H57.813 BROW PTOSIS, BILATERAL: Status: ACTIVE | Noted: 2023-10-31

## 2023-10-31 PROBLEM — M72.2 PLANTAR FASCIITIS: Status: ACTIVE | Noted: 2020-01-23

## 2023-10-31 PROBLEM — M76.829 POSTERIOR TIBIAL TENDON DYSFUNCTION: Status: ACTIVE | Noted: 2020-01-23

## 2023-10-31 PROBLEM — H02.89 STEATOBLEPHARON: Status: ACTIVE | Noted: 2023-10-31

## 2023-10-31 PROBLEM — M21.6X9 PRONATION OF FOOT: Status: ACTIVE | Noted: 2020-01-23

## 2023-10-31 PROBLEM — E61.1 IRON DEFICIENCY: Status: ACTIVE | Noted: 2023-10-31

## 2023-10-31 RX ORDER — ELETRIPTAN HYDROBROMIDE 40 MG/1
40 TABLET, FILM COATED ORAL ONCE AS NEEDED
COMMUNITY
End: 2024-02-07 | Stop reason: WASHOUT

## 2023-10-31 RX ORDER — TRIAMCINOLONE ACETONIDE 0.25 MG/G
OINTMENT TOPICAL
COMMUNITY
Start: 2023-06-09 | End: 2024-02-07 | Stop reason: WASHOUT

## 2023-10-31 RX ORDER — FLUTICASONE PROPIONATE AND SALMETEROL 250; 50 UG/1; UG/1
POWDER RESPIRATORY (INHALATION)
COMMUNITY
End: 2024-02-07 | Stop reason: WASHOUT

## 2023-10-31 RX ORDER — GINKGO BILOBA LEAF EXTRACT 60 MG
100 CAPSULE ORAL DAILY
COMMUNITY
Start: 2023-06-29

## 2023-10-31 RX ORDER — FLUTICASONE FUROATE 100 UG/1
POWDER RESPIRATORY (INHALATION)
COMMUNITY
Start: 2022-12-02 | End: 2024-02-07 | Stop reason: WASHOUT

## 2023-10-31 RX ORDER — FLUTICASONE PROPIONATE 50 MCG
2 SPRAY, SUSPENSION (ML) NASAL DAILY
COMMUNITY

## 2023-10-31 RX ORDER — HYOSCYAMINE SULFATE 0.12 MG/1
TABLET, ORALLY DISINTEGRATING ORAL
COMMUNITY
End: 2024-02-07 | Stop reason: WASHOUT

## 2023-10-31 RX ORDER — POTASSIUM &MAGNESIUM ASPARTATE 250-250 MG
2 CAPSULE ORAL DAILY
COMMUNITY
Start: 2023-06-29

## 2023-10-31 RX ORDER — TRAMADOL HYDROCHLORIDE 50 MG/1
TABLET ORAL
COMMUNITY
End: 2024-02-07 | Stop reason: WASHOUT

## 2023-10-31 RX ORDER — LANOLIN ALCOHOL/MO/W.PET/CERES
1 CREAM (GRAM) TOPICAL DAILY
COMMUNITY
End: 2024-02-07 | Stop reason: WASHOUT

## 2023-10-31 RX ORDER — SCOLOPAMINE TRANSDERMAL SYSTEM 1 MG/1
1 PATCH, EXTENDED RELEASE TRANSDERMAL
COMMUNITY
End: 2024-02-07 | Stop reason: WASHOUT

## 2023-10-31 RX ORDER — TRIAMCINOLONE ACETONIDE 1 MG/G
OINTMENT TOPICAL 2 TIMES DAILY
COMMUNITY
End: 2024-02-07 | Stop reason: WASHOUT

## 2023-10-31 RX ORDER — ONDANSETRON HYDROCHLORIDE 8 MG/1
8 TABLET, FILM COATED ORAL EVERY 8 HOURS PRN
COMMUNITY
End: 2024-03-20 | Stop reason: WASHOUT

## 2023-10-31 RX ORDER — NAPROXEN SODIUM 220 MG/1
1 TABLET ORAL DAILY
COMMUNITY
Start: 2023-06-29

## 2023-10-31 RX ORDER — LISINOPRIL 10 MG/1
TABLET ORAL
COMMUNITY
End: 2024-02-07 | Stop reason: WASHOUT

## 2023-10-31 RX ORDER — ACETAMINOPHEN 325 MG/1
650 TABLET ORAL EVERY 6 HOURS PRN
COMMUNITY
Start: 2023-06-20

## 2023-10-31 RX ORDER — BENZONATATE 200 MG/1
CAPSULE ORAL
COMMUNITY
End: 2024-02-07 | Stop reason: WASHOUT

## 2023-10-31 RX ORDER — HYDROXYZINE HYDROCHLORIDE 25 MG/1
TABLET, FILM COATED ORAL
COMMUNITY
End: 2024-02-07 | Stop reason: WASHOUT

## 2023-10-31 RX ORDER — MAGNESIUM HYDROXIDE 400 MG/5ML
1 SUSPENSION, ORAL (FINAL DOSE FORM) ORAL DAILY
COMMUNITY
End: 2024-01-24 | Stop reason: SDUPTHER

## 2023-10-31 RX ORDER — IPRATROPIUM BROMIDE AND ALBUTEROL SULFATE 2.5; .5 MG/3ML; MG/3ML
3 SOLUTION RESPIRATORY (INHALATION) 4 TIMES DAILY PRN
COMMUNITY

## 2023-11-01 ENCOUNTER — CLINICAL SUPPORT (OUTPATIENT)
Dept: SLEEP MEDICINE | Facility: CLINIC | Age: 63
End: 2023-11-01
Payer: COMMERCIAL

## 2023-11-01 DIAGNOSIS — G47.33 OBSTRUCTIVE SLEEP APNEA SYNDROME: ICD-10-CM

## 2023-11-01 PROCEDURE — 95806 SLEEP STUDY UNATT&RESP EFFT: CPT | Performed by: INTERNAL MEDICINE

## 2023-11-01 NOTE — PROGRESS NOTES
Type of Study: HOME SLEEP STUDY - NOMAD     The patient received equipment and instructions for use of the [facility] Nom[facility] HSAT device. The patient was instructed how to apply the effort belts, cannula, thermistor. It was also explained how the Nomad and oximeter components work.  The patient was asked to record their sleep for an 8-hour period.     The patient was informed of their responsibility for the device and acknowledged this by signing the HSAT device contract. The patient was asked to return the device on 11/3/2023 between the hours of [id number] to the [facility].     The patient was instructed to call 911 as usual for any medical- emergencies while at home.  The patient was also given a phone number for troubleshooting when using the device in case there were additional questions.

## 2023-11-02 ENCOUNTER — HOME INFUSION (OUTPATIENT)
Dept: INFUSION THERAPY | Age: 63
End: 2023-11-02
Payer: COMMERCIAL

## 2023-11-02 NOTE — PROGRESS NOTES
Review of chart... Pt with order for Hizentra 20gm weekly... Order is good thru 7/28/24... Auth is good thru 7/24/24... FOTM for Nov has been checked...    Pt will need next dose for 11/3/23... She has requested that we get her delivery ready for her to  tomorrow when she is in the office by 2 pm    Processed fill for 8 x 10gm Hizentra vials for pt  11/3/23 to cover doses 11/3, 11/10, 11/17, and 11/24/23.    FU 11/27/23 check next delivery date and add to Dec FOTM list as needed

## 2023-11-03 ENCOUNTER — PHARMACY VISIT (OUTPATIENT)
Dept: PHARMACY | Facility: CLINIC | Age: 63
End: 2023-11-03
Payer: COMMERCIAL

## 2023-11-03 ENCOUNTER — DOCUMENTATION (OUTPATIENT)
Dept: INFUSION THERAPY | Age: 63
End: 2023-11-03
Payer: COMMERCIAL

## 2023-11-03 ENCOUNTER — HOME INFUSION (OUTPATIENT)
Dept: INFUSION THERAPY | Age: 63
End: 2023-11-03
Payer: COMMERCIAL

## 2023-11-03 RX ORDER — LIDOCAINE AND PRILOCAINE 25; 25 MG/G; MG/G
CREAM TOPICAL
Qty: 60 G | Refills: 6 | OUTPATIENT
Start: 2023-11-03

## 2023-11-03 NOTE — PROGRESS NOTES
Message left with Dr Richey's per pt request to have Rx for EMLA cream sent to [facility] infusion pharmacy for use prior to Hizentra infusions.     Pt was given 1200 ml/hr tubing in addition to 900 ml/hr with this delivery to see if this will speed up infusion time. Check with pt next delivery to determine pt preference.

## 2023-11-03 NOTE — PROGRESS NOTES
Patient's medication and supplies will be available for  by 2pm today.     Sending supplies for subcutaneous administration of 4 doses of Hizentra qweekly via [place] 60 pump. Including all tubing and syringes necessary, etc.     Pt can talk to [facility] at time of  if questions

## 2023-11-07 ENCOUNTER — HOME INFUSION (OUTPATIENT)
Dept: INFUSION THERAPY | Age: 63
End: 2023-11-07
Payer: COMMERCIAL

## 2023-11-07 ENCOUNTER — PATIENT MESSAGE (OUTPATIENT)
Dept: PRIMARY CARE | Facility: CLINIC | Age: 63
End: 2023-11-07
Payer: COMMERCIAL

## 2023-11-07 DIAGNOSIS — G47.33 OBSTRUCTIVE SLEEP APNEA SYNDROME: Primary | ICD-10-CM

## 2023-11-07 NOTE — PROGRESS NOTES
Pt had requested tube of EMLA to be used prior to Hiznetra infusions. Rx sent to  [facility]. [facility] pulled order from [facility] and sent to ins for ins check. No charge to pt at this time.    Pharmacy to dispense the following 11/8:  1x 30 gm tube of EMLA  DOS 11/9-12/7    Pharmacy check each month if pt needs    Pt to  from pharmacy - do not deliver to pt

## 2023-11-08 ENCOUNTER — PHARMACY VISIT (OUTPATIENT)
Dept: PHARMACY | Facility: CLINIC | Age: 63
End: 2023-11-08
Payer: COMMERCIAL

## 2023-11-08 PROCEDURE — RXMED WILLOW AMBULATORY MEDICATION CHARGE

## 2023-11-09 ENCOUNTER — PHARMACY VISIT (OUTPATIENT)
Dept: PHARMACY | Facility: CLINIC | Age: 63
End: 2023-11-09
Payer: COMMERCIAL

## 2023-11-09 PROCEDURE — RXMED WILLOW AMBULATORY MEDICATION CHARGE

## 2023-11-09 RX ORDER — AZELASTINE 1 MG/ML
SPRAY, METERED NASAL
Qty: 90 ML | Refills: 3 | Status: SHIPPED | OUTPATIENT
Start: 2023-11-09 | End: 2024-11-07

## 2023-11-14 ENCOUNTER — PHARMACY VISIT (OUTPATIENT)
Dept: PHARMACY | Facility: CLINIC | Age: 63
End: 2023-11-14
Payer: COMMERCIAL

## 2023-11-14 PROCEDURE — RXMED WILLOW AMBULATORY MEDICATION CHARGE

## 2023-11-15 ENCOUNTER — OFFICE VISIT (OUTPATIENT)
Dept: PRIMARY CARE | Facility: CLINIC | Age: 63
End: 2023-11-15
Payer: COMMERCIAL

## 2023-11-15 VITALS
BODY MASS INDEX: 39.94 KG/M2 | TEMPERATURE: 97.3 F | OXYGEN SATURATION: 97 % | HEART RATE: 69 BPM | DIASTOLIC BLOOD PRESSURE: 70 MMHG | WEIGHT: 255 LBS | SYSTOLIC BLOOD PRESSURE: 112 MMHG

## 2023-11-15 DIAGNOSIS — E66.01 CLASS 3 SEVERE OBESITY DUE TO EXCESS CALORIES WITH SERIOUS COMORBIDITY AND BODY MASS INDEX (BMI) OF 40.0 TO 44.9 IN ADULT (MULTI): Primary | ICD-10-CM

## 2023-11-15 DIAGNOSIS — J96.10 CHRONIC RESPIRATORY FAILURE, UNSPECIFIED WHETHER WITH HYPOXIA OR HYPERCAPNIA (MULTI): ICD-10-CM

## 2023-11-15 DIAGNOSIS — E78.2 COMBINED HYPERLIPIDEMIA: ICD-10-CM

## 2023-11-15 DIAGNOSIS — J47.9 BRONCHIECTASIS, UNCOMPLICATED (MULTI): ICD-10-CM

## 2023-11-15 DIAGNOSIS — D83.9 COMMON VARIABLE IMMUNODEFICIENCY, UNSPECIFIED (MULTI): ICD-10-CM

## 2023-11-15 DIAGNOSIS — E11.9 DIABETES MELLITUS TYPE 2 WITHOUT RETINOPATHY (MULTI): ICD-10-CM

## 2023-11-15 LAB — HBA1C MFR BLD: 5.5 % (ref 4.2–6.5)

## 2023-11-15 PROCEDURE — 3078F DIAST BP <80 MM HG: CPT | Performed by: INTERNAL MEDICINE

## 2023-11-15 PROCEDURE — 1126F AMNT PAIN NOTED NONE PRSNT: CPT | Performed by: INTERNAL MEDICINE

## 2023-11-15 PROCEDURE — 3008F BODY MASS INDEX DOCD: CPT | Performed by: INTERNAL MEDICINE

## 2023-11-15 PROCEDURE — 99214 OFFICE O/P EST MOD 30 MIN: CPT | Performed by: INTERNAL MEDICINE

## 2023-11-15 PROCEDURE — 83036 HEMOGLOBIN GLYCOSYLATED A1C: CPT | Mod: CLIA WAIVED TEST | Performed by: INTERNAL MEDICINE

## 2023-11-15 PROCEDURE — 1159F MED LIST DOCD IN RCRD: CPT | Performed by: INTERNAL MEDICINE

## 2023-11-15 PROCEDURE — 1036F TOBACCO NON-USER: CPT | Performed by: INTERNAL MEDICINE

## 2023-11-15 PROCEDURE — 3044F HG A1C LEVEL LT 7.0%: CPT | Performed by: INTERNAL MEDICINE

## 2023-11-15 PROCEDURE — 4010F ACE/ARB THERAPY RXD/TAKEN: CPT | Performed by: INTERNAL MEDICINE

## 2023-11-15 PROCEDURE — 3074F SYST BP LT 130 MM HG: CPT | Performed by: INTERNAL MEDICINE

## 2023-11-15 NOTE — PROGRESS NOTES
Subjective   Lara Morris is a 63 y.o. female who presents for Follow-up.  HPI  Last seen 4/29/2021    Past medical history includes CVID, obesity, hypothyroidism, asthma, hypertension, osteoarthritis and diabetes.    Initial A1c > 9%, started on Metformin and CSI (for steroid coverage).  Has not required any insulin in some time now.  On Mounjaro since May 2023, no side effects.  Has really helped with controlling hunger.  Able to limit carbs more easily.  Lost over 30lbs in 3 months of initiation and another 12lbs since last visit.  Started at ~300lb (5/2023) --> 255 (11/15/2023).    We also started her on statin for hyperlipidemia but atorvastatin 20 mg was not tolerated due to severe cramping and spasms in the legs, nor was rosuvastatin 5 mg even at every other day dosing.  Started on Zetia and tolerating without side effects.         She lives with her partner Comfort and 2 dogs. She does have 2 daughters.  She works for  [employer].   Nonsmoker, no drug use, social alcohol use.     Providers:  Immunology-Dr. Emilei Richey  Surgery - Dr. Torres  Rheumatology-Dr. Moreno- Dr. Tiffany Love (no longer with )   Pulmonary: Dr. Gorge Wilkinson  Ophthalmology: Dr. Hanson  Neurology: Dr. Richie Tristan  Sleep: Dr. Wong    Objective   /70   Pulse 69   Temp 36.3 °C (97.3 °F)   Wt 116 kg (255 lb)   SpO2 97%   BMI 39.94 kg/m²    Physical Exam  Gen: NAD, pleasant, A&;Ox3  HEENT: PERRL, EOMI, MMM, OP clear, TM clear  Neck: supple, no thyromegaly, no JVD, normal carotid upstroke  Pulm: lungs CTAB, mild basilar inspiratory wheezes especially at RLL  CV: RRR, early systolic murmur, midsternal, prominent P2, 2+ DP pulses  Abd: NABS, soft, NT, ND no HSM  Ext: no peripheral edema  Neuro: CN II-XII intact, no focal sensory or motor deficits, normal reflexes     Assessment/Plan     Metabolic syndrome: NIDDM/HTN/HLD/Obesity, A1c 5.5%  - Continue metformin 1000 mg decreasing to once daily  -Continue Mounjaro  7.5mg weekly (initial weight ~300lb)  -Sliding-scale insulin for glucose control with use of steroids if needed, has been sometime since there was any concern  -Statin not tolerated even at very low dose (rosuva 5mg QOD); continue Zetia, recheck next visit  -Blood pressure is controlled off lisinopril, even small dose causing symptoms of hypotension  -Recommend annual podiatry and eye exam    Hypothyroidism:   -Continues on levothyroxine, 150mcg daily  - normal TSH 5/23/2023    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 Torres/team  - CTPE negative  - PFTs normal May 2023  - mild aortic stenosis and diastolic dysfunction, June 2023; repeat TTE in one year       CVID and asthma: managed by Dr. Emilie Richey, on Hizentra    JOSÉ MIGUEL/RLS: Moderate with hypoxia on HSAT 11/1/2023, iron stores adequate  -Recommended CPAP/BiPAP titration study which is pending      **COPIED FORWARD FOR REFERENCE**     Abdominal discomfort: History of gastritis, chronic NSAID use, abdominal surgery and diverticulosis; likely all contributing, continue current regimen and aggressive control of constipation     Health maintenance  -Mammogram: 9/1/2020  -Pap: History of hysterectomy, unclear if indicated  -DEXA: If not done should check baseline given steroid use  -Last colonoscopy: 04/22/2019 , tubular adenoma Repeat: 3 years (poor prep)  -Smoking history: Never  -Counseled regarding diet and exercise  -Immunizations: Per Dr. Emilie Richey  -Followup in 3-6 months, sooner if needed         Chan Hernandez MD

## 2023-11-16 ENCOUNTER — PHARMACY VISIT (OUTPATIENT)
Dept: PHARMACY | Facility: CLINIC | Age: 63
End: 2023-11-16
Payer: COMMERCIAL

## 2023-11-16 ENCOUNTER — SPECIALTY PHARMACY (OUTPATIENT)
Dept: PHARMACY | Facility: CLINIC | Age: 63
End: 2023-11-16

## 2023-11-16 PROCEDURE — RXMED WILLOW AMBULATORY MEDICATION CHARGE

## 2023-11-16 RX ORDER — MEPOLIZUMAB 100 MG/ML
100 INJECTION, SOLUTION SUBCUTANEOUS
Qty: 1 ML | Refills: 12 | OUTPATIENT
Start: 2023-11-16

## 2023-11-17 ENCOUNTER — PHARMACY VISIT (OUTPATIENT)
Dept: PHARMACY | Facility: CLINIC | Age: 63
End: 2023-11-17
Payer: COMMERCIAL

## 2023-11-17 PROCEDURE — RXMED WILLOW AMBULATORY MEDICATION CHARGE

## 2023-11-20 ENCOUNTER — PHARMACY VISIT (OUTPATIENT)
Dept: PHARMACY | Facility: CLINIC | Age: 63
End: 2023-11-20
Payer: COMMERCIAL

## 2023-11-20 PROCEDURE — RXMED WILLOW AMBULATORY MEDICATION CHARGE

## 2023-11-21 ENCOUNTER — CLINICAL SUPPORT (OUTPATIENT)
Dept: SLEEP MEDICINE | Facility: CLINIC | Age: 63
End: 2023-11-21
Payer: COMMERCIAL

## 2023-11-21 DIAGNOSIS — G47.33 OBSTRUCTIVE SLEEP APNEA SYNDROME: ICD-10-CM

## 2023-11-21 PROCEDURE — 95811 POLYSOM 6/>YRS CPAP 4/> PARM: CPT | Performed by: PSYCHIATRY & NEUROLOGY

## 2023-11-21 ASSESSMENT — SLEEP AND FATIGUE QUESTIONNAIRES: ESS TOTAL SCORE: 2

## 2023-11-21 ASSESSMENT — PAIN SCALES - GENERAL: PAINLEVEL: 0-NO PAIN

## 2023-11-22 VITALS
SYSTOLIC BLOOD PRESSURE: 135 MMHG | DIASTOLIC BLOOD PRESSURE: 88 MMHG | BODY MASS INDEX: 43.32 KG/M2 | WEIGHT: 276 LBS | OXYGEN SATURATION: 98 % | RESPIRATION RATE: 20 BRPM | HEIGHT: 67 IN

## 2023-11-22 ASSESSMENT — SLEEP AND FATIGUE QUESTIONNAIRES
SITTING AND RIDING IN A CAR OR BUS FOR ABOUT HALF AN HOUR: 0
LYING DOWN TO REST OR NAP IN THE AFTERNOON: 0
SITTING QUITELY BY YOURSELF AFTER LUNCH: 0
SITTING AND WATCHING TV OR A VIDEO: 1
ESS-CHAD TOTAL SCORE: 2
SITTING IN A CLASSROOM AT SCHOOL DURING THE MORNING: 0
SITTING AND EATING A MEAL: 0
SITTING AND TALKING TO SOMEONE: 0
SITTING AND READING: 1

## 2023-11-22 NOTE — PROGRESS NOTES
[facility] TECH NOTE:     Patient: Lara Morris   MRN//AGE: [id number]  1960  63 y.o.   Technologist: Kristy Lion   Room: 106   Service Date: 2023        Sleep Testing Location: [facility]: 2    TECHNOLOGIST SLEEP STUDY PROCEDURE NOTE:   This sleep study is being conducted according to the policies and procedures outlined by the AAS accreditation standards.  The sleep study procedure and processes involved during this appointment was explained to the patient/patient’s family, questions were answered. The patient/family verbalized understanding.      The patient is a 63 y.o. year old female scheduled for a CPAP titration.   She arrived for her appointment.      The study that was ultimately completed was a BPAP titration.     The full study was completed.  Patient questionnaires completed?: yes     Consents signed? yes    Initial Fall Risk Screening: Lara has not fallen in the last 6 months. Lara does not have a fear of falling. She does not need assistance with sitting, standing, or walking. She does not need assistance walking in her home. She does not need assistance in an unfamiliar setting. The patient is not using an assistive device.     Brief Study observations: 63 year old female presents for a CPAP titration study. Titration was started out at 6cmH2O, with a ResMed AirFit N20 mask (size medium).  Patient needed to sleep with the head of the bed elevated due to having a diaphragmatic pacemaker,  she cannot lay flat.  The head of the bed was elevated at 35 degrees.  Patient stated that she was having a hard time with the pressure of CPAP due to her diaphragmatic pacemaker, switched over to BiPAP starting at 8/4 cwp.  Patient woke up one time to use the restroom throughout the night.  REM was observed. Patient asked to be woken up at 0430 due to having to work in the morning, and lives more than an hour away from the lab.      Deviation to order/protocol and reason: None      If PAP, which was  preferred mask/pressure/mode: ResMed AirFit N20, size medium    Other:None    After the procedure, the patient/family was informed to ensure followup with ordering clinician for testing results.      Technologist: Kristy Lion

## 2023-11-24 PROBLEM — J47.9 BRONCHIECTASIS, UNCOMPLICATED (MULTI): Status: ACTIVE | Noted: 2023-11-24

## 2023-11-27 ENCOUNTER — SPECIALTY PHARMACY (OUTPATIENT)
Dept: PHARMACY | Facility: CLINIC | Age: 63
End: 2023-11-27

## 2023-11-27 ENCOUNTER — PHARMACY VISIT (OUTPATIENT)
Dept: PHARMACY | Facility: CLINIC | Age: 63
End: 2023-11-27
Payer: COMMERCIAL

## 2023-11-27 PROCEDURE — RXMED WILLOW AMBULATORY MEDICATION CHARGE

## 2023-11-28 ENCOUNTER — PHARMACY VISIT (OUTPATIENT)
Dept: PHARMACY | Facility: CLINIC | Age: 63
End: 2023-11-28
Payer: COMMERCIAL

## 2023-11-28 DIAGNOSIS — E66.01 CLASS 3 SEVERE OBESITY DUE TO EXCESS CALORIES WITH SERIOUS COMORBIDITY AND BODY MASS INDEX (BMI) OF 45.0 TO 49.9 IN ADULT (MULTI): ICD-10-CM

## 2023-11-28 DIAGNOSIS — E11.9 DIABETES MELLITUS TYPE 2 WITHOUT RETINOPATHY (MULTI): ICD-10-CM

## 2023-11-28 PROCEDURE — RXMED WILLOW AMBULATORY MEDICATION CHARGE

## 2023-11-28 RX ORDER — TIRZEPATIDE 7.5 MG/.5ML
INJECTION, SOLUTION SUBCUTANEOUS
Qty: 2 ML | Refills: 2 | Status: SHIPPED | OUTPATIENT
Start: 2023-11-28 | End: 2024-02-19 | Stop reason: SDUPTHER

## 2023-12-01 ENCOUNTER — HOME INFUSION (OUTPATIENT)
Dept: INFUSION THERAPY | Age: 63
End: 2023-12-01
Payer: COMMERCIAL

## 2023-12-01 ENCOUNTER — DOCUMENTATION (OUTPATIENT)
Dept: INFUSION THERAPY | Age: 63
End: 2023-12-01
Payer: COMMERCIAL

## 2023-12-01 NOTE — PROGRESS NOTES
Review of chart... Pt with order for Hizentra 20gm weekly... Order is good thru 7/28/24... Auth is good thru 7/24/24... FOTM for December has been checked but Pharmacy billing.     Pt will need next dose for 12/4/23... She has requested that we get her delivery ready for her to  Monday before 12pm when she is in the office for pickup.  EMLA requested this month. Rejected for refill too soon, but PAID CLAIM with Therapy Change Modifier due to need for monthly dispensing.     Processed fill for 8 x 10gm Hizentra vials ans 1 tube EMLA for 12/01/23 dispense and pt  12/4/23 to cover doses 12/4 through 12/31/23.     FU 12/21/23 check next delivery date and add to Jan24 FOTM list. OK to dispense last week of December with paid Pharmacy claim.

## 2023-12-01 NOTE — PROGRESS NOTES
Set up  for Monday 12/4. Patient will  in warehouse, or notify office staff of her arrival and will be given package. This was arranged with patient by [facility].     Sending stnd supplies for 4 doses of Hizentra administered subcutaneously via [place] 60 pump.

## 2023-12-04 ENCOUNTER — PROCEDURE VISIT (OUTPATIENT)
Dept: NEUROLOGY | Facility: CLINIC | Age: 63
End: 2023-12-04
Payer: COMMERCIAL

## 2023-12-04 VITALS — WEIGHT: 253 LBS | BODY MASS INDEX: 39.63 KG/M2

## 2023-12-04 DIAGNOSIS — G51.9 FACIAL NERVE DISORDER: ICD-10-CM

## 2023-12-04 DIAGNOSIS — G51.32 HEMIFACIAL SPASM OF LEFT SIDE OF FACE: Primary | ICD-10-CM

## 2023-12-04 PROCEDURE — 64612 DESTROY NERVE FACE MUSCLE: CPT | Performed by: PSYCHIATRY & NEUROLOGY

## 2023-12-04 NOTE — PROGRESS NOTES
Neurology Botulinum Injection    Subjective   Lara Morris is an 63 y.o. female here for medical botulinum injection for Hemifacial spasm of left side of face [G51.32].   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 Partially worn off.        Interval PMH: diaphragmatic pacemaker placed     PMHx   CVID , taking IVIG for 12 years   Asthma  osteoarthritis   B12 deficiency   bilateral hearing impairment   wears CPAP      PSHx  Bilateral knee replacement    take alcohol     Objective   Neurological Exam  Exam today: Minimal twitching around the eye and L lower face on exam today.  Physical Exam  Procedures    Prior to the start of the procedure a time out was taken and the following were verified: the identity of the patient using two patient identifiers (name, ) - this was verified by the patient      The area was prepped in the usual sterile fashion. Botulinum toxin type A (Botox)was injected as follows:     Dose Sites Muscle  5 units 1 left superomedial orbicularis ocularis   5 units 1 left superolateral orbicularis ocularis   5 units 1 left lateral orbicularis ocularis   5 units 1 left Inferior lateral orbicularis ocularis   5 units 1 left Inferior orbicularis ocularis   3 units 1 left zygomaticus   1 unit 1 left risorius (+1unit)  1 unit 1 left upper orbicularis oris (+1unit)     Patient supplied Botox.  Total botulinum toxin used was 30 units, 70 discarded, patient supplied 100 units     diagnosis: hemifacial spasm    Assessment/Plan   The procedure was well tolerated. The patient will return in approximately 3 months for follow up injection

## 2023-12-06 PROCEDURE — RXMED WILLOW AMBULATORY MEDICATION CHARGE

## 2023-12-07 ENCOUNTER — PHARMACY VISIT (OUTPATIENT)
Dept: PHARMACY | Facility: CLINIC | Age: 63
End: 2023-12-07
Payer: COMMERCIAL

## 2023-12-08 ENCOUNTER — LAB (OUTPATIENT)
Dept: LAB | Facility: LAB | Age: 63
End: 2023-12-08
Payer: COMMERCIAL

## 2023-12-08 DIAGNOSIS — D83.0 COMMON VARIABLE IMMUNODEFICIENCY WITH PREDOMINANT ABNORMALITIES OF B-CELL NUMBERS AND FUNCTION (MULTI): Primary | ICD-10-CM

## 2023-12-08 DIAGNOSIS — Z79.899 OTHER LONG TERM (CURRENT) DRUG THERAPY: ICD-10-CM

## 2023-12-08 DIAGNOSIS — D83.0 COMMON VARIABLE IMMUNODEFICIENCY WITH PREDOMINANT ABNORMALITIES OF B-CELL NUMBERS AND FUNCTION (MULTI): ICD-10-CM

## 2023-12-08 LAB
ALBUMIN SERPL BCP-MCNC: 3.7 G/DL (ref 3.4–5)
ALP SERPL-CCNC: 70 U/L (ref 33–136)
ALT SERPL W P-5'-P-CCNC: 13 U/L (ref 7–45)
ANION GAP SERPL CALC-SCNC: 10 MMOL/L (ref 10–20)
AST SERPL W P-5'-P-CCNC: 12 U/L (ref 9–39)
BASOPHILS # BLD AUTO: 0.04 X10*3/UL (ref 0–0.1)
BASOPHILS NFR BLD AUTO: 0.5 %
BILIRUB SERPL-MCNC: 0.4 MG/DL (ref 0–1.2)
BUN SERPL-MCNC: 23 MG/DL (ref 6–23)
CALCIUM SERPL-MCNC: 9.4 MG/DL (ref 8.6–10.6)
CHLORIDE SERPL-SCNC: 102 MMOL/L (ref 98–107)
CO2 SERPL-SCNC: 31 MMOL/L (ref 21–32)
CREAT SERPL-MCNC: 1.03 MG/DL (ref 0.5–1.05)
CRP SERPL-MCNC: 0.33 MG/DL
EOSINOPHIL # BLD AUTO: 0.05 X10*3/UL (ref 0–0.7)
EOSINOPHIL NFR BLD AUTO: 0.6 %
ERYTHROCYTE [DISTWIDTH] IN BLOOD BY AUTOMATED COUNT: 15.1 % (ref 11.5–14.5)
ERYTHROCYTE [SEDIMENTATION RATE] IN BLOOD BY WESTERGREN METHOD: 17 MM/H (ref 0–30)
GFR SERPL CREATININE-BSD FRML MDRD: 60 ML/MIN/1.73M*2
GLUCOSE SERPL-MCNC: 74 MG/DL (ref 74–99)
HCT VFR BLD AUTO: 37.1 % (ref 36–46)
HGB BLD-MCNC: 11.7 G/DL (ref 12–16)
IGA SERPL-MCNC: 166 MG/DL (ref 70–400)
IGG SERPL-MCNC: 961 MG/DL (ref 700–1600)
IGM SERPL-MCNC: 188 MG/DL (ref 40–230)
IMM GRANULOCYTES # BLD AUTO: 0.03 X10*3/UL (ref 0–0.7)
IMM GRANULOCYTES NFR BLD AUTO: 0.4 % (ref 0–0.9)
LYMPHOCYTES # BLD AUTO: 2.4 X10*3/UL (ref 1.2–4.8)
LYMPHOCYTES NFR BLD AUTO: 29.4 %
MCH RBC QN AUTO: 26.8 PG (ref 26–34)
MCHC RBC AUTO-ENTMCNC: 31.5 G/DL (ref 32–36)
MCV RBC AUTO: 85 FL (ref 80–100)
MONOCYTES # BLD AUTO: 0.44 X10*3/UL (ref 0.1–1)
MONOCYTES NFR BLD AUTO: 5.4 %
NEUTROPHILS # BLD AUTO: 5.21 X10*3/UL (ref 1.2–7.7)
NEUTROPHILS NFR BLD AUTO: 63.7 %
NRBC BLD-RTO: 0 /100 WBCS (ref 0–0)
PLATELET # BLD AUTO: 269 X10*3/UL (ref 150–450)
POTASSIUM SERPL-SCNC: 4.7 MMOL/L (ref 3.5–5.3)
PROT SERPL-MCNC: 6.6 G/DL (ref 6.4–8.2)
RBC # BLD AUTO: 4.37 X10*6/UL (ref 4–5.2)
SODIUM SERPL-SCNC: 138 MMOL/L (ref 136–145)
WBC # BLD AUTO: 8.2 X10*3/UL (ref 4.4–11.3)

## 2023-12-08 PROCEDURE — 82784 ASSAY IGA/IGD/IGG/IGM EACH: CPT

## 2023-12-08 PROCEDURE — 85652 RBC SED RATE AUTOMATED: CPT

## 2023-12-08 PROCEDURE — 80053 COMPREHEN METABOLIC PANEL: CPT

## 2023-12-08 PROCEDURE — 86140 C-REACTIVE PROTEIN: CPT

## 2023-12-08 PROCEDURE — 36415 COLL VENOUS BLD VENIPUNCTURE: CPT

## 2023-12-08 PROCEDURE — 85025 COMPLETE CBC W/AUTO DIFF WBC: CPT

## 2023-12-11 DIAGNOSIS — E11.9 DIABETES MELLITUS TYPE 2 WITHOUT RETINOPATHY (MULTI): ICD-10-CM

## 2023-12-11 NOTE — ADDENDUM NOTE
Addended by: JOVON AVILES on: 12/11/2023 12:45 PM     Modules accepted: Orders, Level of Service

## 2023-12-12 RX ORDER — INSULIN ASPART 100 [IU]/ML
INJECTION, SOLUTION INTRAVENOUS; SUBCUTANEOUS
Qty: 15 ML | Refills: 3 | OUTPATIENT
Start: 2023-12-12 | End: 2024-12-11

## 2023-12-13 ENCOUNTER — PHARMACY VISIT (OUTPATIENT)
Dept: PHARMACY | Facility: CLINIC | Age: 63
End: 2023-12-13
Payer: COMMERCIAL

## 2023-12-13 ENCOUNTER — LAB (OUTPATIENT)
Dept: LAB | Facility: LAB | Age: 63
End: 2023-12-13
Payer: COMMERCIAL

## 2023-12-13 DIAGNOSIS — D64.9 ANEMIA, UNSPECIFIED: ICD-10-CM

## 2023-12-13 DIAGNOSIS — D64.9 ANEMIA, UNSPECIFIED: Primary | ICD-10-CM

## 2023-12-13 LAB
BASOPHILS # BLD AUTO: 0.05 X10*3/UL (ref 0–0.1)
BASOPHILS NFR BLD AUTO: 0.5 %
EOSINOPHIL # BLD AUTO: 0.1 X10*3/UL (ref 0–0.7)
EOSINOPHIL NFR BLD AUTO: 1 %
ERYTHROCYTE [DISTWIDTH] IN BLOOD BY AUTOMATED COUNT: 15 % (ref 11.5–14.5)
HCT VFR BLD AUTO: 37.7 % (ref 36–46)
HGB BLD-MCNC: 11.8 G/DL (ref 12–16)
IMM GRANULOCYTES # BLD AUTO: 0.04 X10*3/UL (ref 0–0.7)
IMM GRANULOCYTES NFR BLD AUTO: 0.4 % (ref 0–0.9)
IRON SATN MFR SERPL: 9 % (ref 25–45)
IRON SERPL-MCNC: 44 UG/DL (ref 35–150)
LYMPHOCYTES # BLD AUTO: 3.25 X10*3/UL (ref 1.2–4.8)
LYMPHOCYTES NFR BLD AUTO: 31.3 %
MCH RBC QN AUTO: 26.4 PG (ref 26–34)
MCHC RBC AUTO-ENTMCNC: 31.3 G/DL (ref 32–36)
MCV RBC AUTO: 84 FL (ref 80–100)
MONOCYTES # BLD AUTO: 0.64 X10*3/UL (ref 0.1–1)
MONOCYTES NFR BLD AUTO: 6.2 %
NEUTROPHILS # BLD AUTO: 6.32 X10*3/UL (ref 1.2–7.7)
NEUTROPHILS NFR BLD AUTO: 60.6 %
NRBC BLD-RTO: 0 /100 WBCS (ref 0–0)
PLATELET # BLD AUTO: 290 X10*3/UL (ref 150–450)
RBC # BLD AUTO: 4.47 X10*6/UL (ref 4–5.2)
TIBC SERPL-MCNC: 472 UG/DL (ref 240–445)
UIBC SERPL-MCNC: 428 UG/DL (ref 110–370)
WBC # BLD AUTO: 10.4 X10*3/UL (ref 4.4–11.3)

## 2023-12-13 PROCEDURE — 83550 IRON BINDING TEST: CPT

## 2023-12-13 PROCEDURE — RXMED WILLOW AMBULATORY MEDICATION CHARGE

## 2023-12-13 PROCEDURE — 36415 COLL VENOUS BLD VENIPUNCTURE: CPT

## 2023-12-13 PROCEDURE — 85025 COMPLETE CBC W/AUTO DIFF WBC: CPT

## 2023-12-13 PROCEDURE — 83540 ASSAY OF IRON: CPT

## 2023-12-18 ENCOUNTER — HOME INFUSION (OUTPATIENT)
Dept: INFUSION THERAPY | Age: 63
End: 2023-12-18
Payer: COMMERCIAL

## 2023-12-18 PROCEDURE — RXMED WILLOW AMBULATORY MEDICATION CHARGE

## 2023-12-18 NOTE — PROGRESS NOTES
Review of chart... Pt with order for Hizentra 24gm weekly...Dose change confirmed with MD for goal of improved response of symptoms Order is good for 6 fills... Auth is good thru 7/24/24 and Trace directed [facility] to continue using... will use [facility] 05 override FOTM for December has been checked but Pharmacy billing.     Pt will need new dose for this week... patient requesting 6mm needle sets and Pharmacy working to obtain. Already sent EMLA this month.     Processed fill for 8 x 10gm Hizentra vials and 4 x 4Gm Hizentra for 12/18/23 dispense and pt  or delivery 12/19/23 (patient rep to coordinate with patient) to cover doses 12/20/23 through 1/16/24.     FU 1/10/23 check next delivery date. Get Inventory and see if patient wants to equal out vials for doses and needle size requested. Likely send EMLA. Copay assist should be involved with Trace. Dose needed around 01/17/23.

## 2023-12-19 ENCOUNTER — TELEPHONE (OUTPATIENT)
Dept: INFUSION THERAPY | Age: 63
End: 2023-12-19
Payer: COMMERCIAL

## 2023-12-19 ENCOUNTER — PHARMACY VISIT (OUTPATIENT)
Dept: PHARMACY | Facility: CLINIC | Age: 63
End: 2023-12-19
Payer: COMMERCIAL

## 2023-12-19 PROCEDURE — RXMED WILLOW AMBULATORY MEDICATION CHARGE

## 2023-12-19 NOTE — TELEPHONE ENCOUNTER
"Spoke with patient. Asked her if she could  her medication later than usual d/t to waiting on new needle set to arrive. Patient actually at home today so agreeable to delivery tonight between 6-9pm instead.  to call on the way. Deliver to patient's home address.       Sending drug and standard supplies for 4 doses of Hizentra delivered subcutaneously  via [place] 60 Pump  including new needle set of 0.6\" length.     Patient had no questions for pharmacist.          "

## 2023-12-19 NOTE — TELEPHONE ENCOUNTER
Spoke with patient to let her know new size needle set still had not come in and delivery would have to be delayed. She requested instead that we deliver everything else today and she would use what she has already and then we can send new needle sets separately once they come in.      Delivery will be tonight by 9 pm with the  to call ahead of time. Deliver to home address instead of patient  as usually done.     Sending drug, standard supplies for 4 dose of Hizentra delivered subcutaneously  via [place] [employer] .  Needle sets to come. Patient has some in the home.      Patient had no questions for pharmacist.

## 2023-12-20 ENCOUNTER — PHARMACY VISIT (OUTPATIENT)
Dept: PHARMACY | Facility: CLINIC | Age: 63
End: 2023-12-20

## 2023-12-20 ENCOUNTER — PHARMACY VISIT (OUTPATIENT)
Dept: PHARMACY | Facility: CLINIC | Age: 63
End: 2023-12-20
Payer: COMMERCIAL

## 2023-12-20 PROCEDURE — RXMED WILLOW AMBULATORY MEDICATION CHARGE

## 2023-12-20 NOTE — TELEPHONE ENCOUNTER
NEW NEEDLE SETS CAME IN THIS MORNING AND 2 SETS (PER PATIENT REQUEST) WERE DELIVERED DIRECTLY TO PATIENT AT THE OFFICE BY . TICKET WAS CREATED.

## 2023-12-21 ENCOUNTER — PHARMACY VISIT (OUTPATIENT)
Dept: PHARMACY | Facility: CLINIC | Age: 63
End: 2023-12-21
Payer: COMMERCIAL

## 2024-01-10 ENCOUNTER — HOME INFUSION (OUTPATIENT)
Dept: INFUSION THERAPY | Age: 64
End: 2024-01-10
Payer: COMMERCIAL

## 2024-01-10 PROCEDURE — RXMED WILLOW AMBULATORY MEDICATION CHARGE

## 2024-01-10 NOTE — PROGRESS NOTES
Review of chart... Pt with order for Hizentra 24gm weekly...Dose change confirmed with MD for goal of improved response of symptoms Order is good for 6 fills (dated 12/12/23)... Auth is good thru 7/24/24 and [facility]... will use SCC 05 override FOTM for January has been checked but Pharmacy billing.     Email sent to Loreto COSBY regarding copay assistance.    Pt contacted, will need doses for pickup on 1/17. Requesting 6mm and 9mm needle sets.    Pharmacy to dispense the following 1/16 and for pt pickup 1/17:  1x 30gm tube of EMLA  8x Hizentra 10gm/50mL vials  4x Hizentra 4gm/20mL vials  For DOS 1/18-2/14    Pt to  from pharmacy - do not deliver to pt    FU 2/7 check next delivery date, check inventory and needs. Likely need EMLA. Next dose around 2/15

## 2024-01-11 ENCOUNTER — SPECIALTY PHARMACY (OUTPATIENT)
Dept: PHARMACY | Facility: CLINIC | Age: 64
End: 2024-01-11

## 2024-01-11 ENCOUNTER — PHARMACY VISIT (OUTPATIENT)
Dept: PHARMACY | Facility: CLINIC | Age: 64
End: 2024-01-11

## 2024-01-11 PROCEDURE — RXMED WILLOW AMBULATORY MEDICATION CHARGE

## 2024-01-15 ENCOUNTER — PHARMACY VISIT (OUTPATIENT)
Dept: PHARMACY | Facility: CLINIC | Age: 64
End: 2024-01-15
Payer: COMMERCIAL

## 2024-01-16 ENCOUNTER — PHARMACY VISIT (OUTPATIENT)
Dept: PHARMACY | Facility: CLINIC | Age: 64
End: 2024-01-16
Payer: COMMERCIAL

## 2024-01-16 ENCOUNTER — HOME INFUSION (OUTPATIENT)
Dept: INFUSION THERAPY | Age: 64
End: 2024-01-16
Payer: COMMERCIAL

## 2024-01-16 DIAGNOSIS — M54.50 BILATERAL LOW BACK PAIN WITHOUT SCIATICA, UNSPECIFIED CHRONICITY: Primary | ICD-10-CM

## 2024-01-16 PROCEDURE — RXMED WILLOW AMBULATORY MEDICATION CHARGE

## 2024-01-16 RX ORDER — RIZATRIPTAN BENZOATE 10 MG/1
TABLET ORAL
Qty: 45 TABLET | Refills: 11 | Status: CANCELLED | OUTPATIENT
Start: 2024-01-16 | End: 2025-01-14

## 2024-01-16 RX ORDER — GABAPENTIN 100 MG/1
100 CAPSULE ORAL 3 TIMES DAILY
Qty: 270 CAPSULE | Refills: 2 | Status: SHIPPED | OUTPATIENT
Start: 2024-01-16 | End: 2024-01-24 | Stop reason: WASHOUT

## 2024-01-16 NOTE — PROGRESS NOTES
Per previous pharmacy note:    Pt contacted, will need doses for pickup on 1/17. Requesting 6mm and 9mm needle sets.     Pharmacy to dispense the following 1/16 and for pt pickup 1/17:  1x 30gm tube of EMLA  8x Hizentra 10gm/50mL vials  4x Hizentra 4gm/20mL vials  For DOS 1/18-2/14     Pt to  from pharmacy - do not deliver to pt     FU 2/7 check next delivery date, check inventory and needs. Likely need EMLA. Next dose around 2/15

## 2024-01-17 PROCEDURE — RXMED WILLOW AMBULATORY MEDICATION CHARGE

## 2024-01-18 PROCEDURE — RXMED WILLOW AMBULATORY MEDICATION CHARGE

## 2024-01-19 ENCOUNTER — PHARMACY VISIT (OUTPATIENT)
Dept: PHARMACY | Facility: CLINIC | Age: 64
End: 2024-01-19
Payer: COMMERCIAL

## 2024-01-22 NOTE — PROGRESS NOTES
Patient: Lara Morris    [id number]  : 1960 -- AGE 63 y.o.    Provider: Melani Wong MD PhD     Location [facility]   Service Date: 2024              [facility]  Followup Visit Note      HISTORY OF PRESENT ILLNESS     The patient's referring provider is: Chan Hernandez MD     HISTORY OF PRESENT ILLNESS   Lara Morirs is a 63 y.o. female with past medical history significant for JOSÉ MIGUEL on CPAP, CVID, aortic stenosis, T2DM, asthma, right hemidiaphragm paralysis s/p b/l pacer on 23, CFS, GERD, hypothyroid who presents to a [facility] for followup.       PAST SLEEP HISTORY  Patient seen by me on 23. Patient reported that she has known JOSÉ MIGUEL for 8 years and has been on CPAP. She was referred to establish care for her JOSÉ MIGUEL. She has benefitted from CPAP with nasal pillows on CPAP at [employer]. She thinks she had severe JOSÉ MIGUEL. On CPAP she does not snore, gasp, choke. Without CPAP she will have her leg jerks. She sleeps at about 30 degrees. Her DME was [facility]. She gets supplies now from [employer] she thinks.     She also reported RLS symptoms. She described them as a sense that she has to move her legs. If her legs are still there will spontaneously move on their own. mostly at night but now in the evenings. She massages her legs to help. If she walks they are removed she was on roprinole but had hives. Bother her every night and makes falling asleep difficult. She is currently on Neurontin at 300 mg for back pain that she takes at night just before going to bed. Symptoms start at 7:30-8pm.      Patient may have been exposed to RSV when her grandchildren were affected. She became sick but did not recover her breathing fully. Patient with right hemidiaphragm paralysis with SOB since 2023 (elevation present since at least ). Patient with pacer placed by Dr. Torres on 23.  With the diaphragm stimulator she has noted that she is  using it all the time. When she stops it or when she goes for her shower she does notice that she is more short of breath at that time. She will have to continue using this for the next 2 years is what she was informed.    A home sleep apnea test was done on 11/1/2023 at a BMI of 38.8.  This showed an FRANCESCA 3% of 17 events/hour and FRANCESCA 4% of 12 events/hour and a central apnea index of 0.1 events/hour.  There was no significant effect of body position.  She then had an in center PAP titration done on 11/21/2023 CPAP at 6 and 7 appeared to be effective as well as BPAP at 8/4.    CURRENT HISTORY  At her last visit we had recommended a home sleep apnea test to document the severity of her sleep apnea.  For her RLS she was on Neurontin and we had recommended taking it earlier in the evening to get ahead of symptoms.  She was using it more for back pain and then RLS symptoms.  Iron testing was also recommended.    She then had an in center PAP titration done on 11/21/2023 CPAP at 6 and 7 appeared to be effective as well as BPAP at 8/4.  The patient's head of bed was slightly elevated.    On today's visit, the patient reports that she is continuing to follow with Dr. Torres team and using the diaphragm stimulator.  It is not clear that she has return of function in her diaphragm to date based on EMG testing.    She recently was in the [facility] and had no issues with using it.  The only time she had problems was when she tried to go snorkeling and could not do it.  She felt claustrophobic which is unusual for her.    From the standpoint of her sleep apnea she had obtained the BiPAP device and is using it at settings of 8/4.  She did increase the VPAP settings on her own to an IPAP of 8.5.  Her [facility] is [facility].  She has had some customer service issues with them.  She was given a TopPatch 3G device.  She is using with a nasal mask.  On this she is not snoring, gasping, nor choking.  She has found that she is quite comfortable with  that and feels that her sleep quality is better with it than without it.  She does sleep at an elevation of about 30 degree incline.    For her restless leg symptoms she continues to report that they have been very bothersome.  She did get iron studies done in she did move the Neurontin to taking it around 6 to 7 PM but still feeling persistent symptoms.  She tried taking Mel over-the-counter medication, but this was not helpful for the leg symptoms.    Sleep schedule:    In bed: 10-11p  Activities in bed:  Bedtime Activities: turns out the lights  Subjective sleep latency:  up to 60 minutes  Awakenings during night: 1-2  Length of awakenings: 30-60 min  Final awakening time: 6AM  Out of bed: 6A  Overall estimate of total sleep time: 5-6    Weekends: wakes at 7AM  Preferred sleeping position: supine and sidelying  Typically sleeps with her .       PAP Adherence:  DURABLE MEDICAL EQUIPMENT COMPANY: [employer]"  Machine: THERAPY: BILEVEL PAP PRESSURE SETTINGS: 8.5 cm H2O / 4 cm H2O  Mask: MASK TYPE: NASAL MASK   Issues with therapy: ISSUES WITH THERAPY: none  Benefits with PAP: PERCEIVED BENEFITS OF PAP: better sleep quality        Daytime Symptoms  On awakening patient reports: wake unrefreshed    Daytime: During the day, she does not doze unintentionally while inactive.  During more active tasks, she never is drowsy.  With regards to daytime napping, the patient reports never taking naps.  She does not report that poor sleep results in mood-related irritability. She does not report that poor sleep results in issues with memory/concentration.    ESS: 3  CAL: 17  FOSQ: 31      REVIEW OF SYSTEMS     REVIEW OF SYSTEMS  Review of Systems   All other systems reviewed and are negative.      ALLERGIES AND MEDICATIONS     ALLERGIES  Allergies   Allergen Reactions    Adhesive Hives    Animal Dander Other and Runny nose     Sneezing    Atorvastatin Other     Myalgias    Clonidine Hives    Codeine Nausea Only and Other      Nausea, Vomiting    Gamunex-C [Immune Globul G-Gly-Iga Avg 46] Hives    House Dust Runny nose     Sneezing    Immune Globulin Unknown    Immune Globulin,Gamma (Igg) Human Unknown    Penicillins Hives    Ropinirole Other     Myalgias    Ciprofloxacin Rash    Erythromycin Rash       MEDICATIONS: She has a current medication list which includes the following prescription(s): acetaminophen - Take 2 tablets (650 mg) by mouth every 6 hours if needed for mild pain (1 - 3), amoxicillin-potassium clavulanate - Take 2 tablets (2,000 mg of amoxicillin) by mouth every 12 hours, arnuity ellipta, azelastine - USE 1-2 SPRAYS IN EACH NOSTRIL ONCE DAILY TO TWO TIMES A DAY AS NEEDED FOR NASAL DRAINAGE, bd esperanza 2nd gen pen needle - Inject 1 Pen needle under the skin 3 times a day before meals. With insulin, benzonatate, accu-chek francis plus test strp - 1 strip by Does not apply route 3 times a day, accu-chek francis plus test strp - Use one strip 3 times daily as directed, botox - Inject up to 100 units into the shoulder, thigh, or buttocks every 90 days, cetirizine - Take 1 tablet (10 mg) by mouth 2 times a day, cholecalciferol - Take 1 tablet (25 mcg) by mouth once daily, cyanocobalamin - INJECT 1 ML UNDER THE SKIN EVERY 3 WEEKS, cyanocobalamin (vitamin b-12) - Inject 1 mL as directed every 21 (twenty-one) days, eletriptan, epinephrine - USE AS NEEDED AS DIRECTED, epinephrine - Inject 0.3 mL (0.3 mg) as directed if needed for anaphylaxis, esomeprazole - TAKE 1 CAPSULE BY MOUTH EVERY MORNING BEFORE MEALS, ezetimibe - Take 1 tablet (10 mg) by mouth once daily, fluticasone - Administer 2 sprays into each nostril once daily, fluticasone propion-salmeterol, fluticasone-umeclidin-vilanter - INHALE 1 PUFF BY MOUTH ONCE DAILY, gabapentin - Take 2 capsules (600 mg) by mouth once daily at bedtime, ginseng - Take by mouth, ginseng - Take by mouth, hizentra - Inject under the skin, hydroxyzine hcl, hyoscyamine, ibuprofen - Take 1 tablet (800 mg)  by mouth 2 times a day as needed, immune globulin (human) - Infuse into a venous catheter. iv ig every 3 weeks, insulin aspart - INJECT UP TO 18 UNITS UNDER THE SKIN THREE TIMES A DAY BEFORE MEALS (6 UNITS FOR BLOOD GLUCOSE -180, 8 UNITS -220, 10 UNITS -260, 12 UNITS -300, 14 UNITS -350, 16 UNITS -400, 18 UNITS  OR HIGHER), ipratropium-albuterol, levothyroxine - TAKE 1 TABLET BY MOUTH ONCE DAILY, lidocaine-prilocaine - Apply 1 application once a day as needed, lisinopril, magnesium oxide - Take 1 tablet (400 mg) by mouth once daily, nucala - Inject 100 mg (1 pen) under the skin every 4 weeks as directed, metformin - TAKE 1 TABLET BY MOUTH TWO TIMES A DAY WITH FOOD, montelukast - TAKE 1 TABLET BY MOUTH ONCE DAILY, multivitamin - Take 1 tablet by mouth once daily, monoject hypodermic needles - Use as directed for injections, 2 every four weeks, paxlovid - Follow directions per packaging, take 3 tablets by mouth twice daily, nucala - Inject 1 Syringe (100 mg) under the skin every 28 (twenty-eight) days, olopatadine - Administer 1 drop into both eyes once daily as needed, omega 3-dha-epa-fish oil - Take 1 capsule (1,200 mg) by mouth once daily, onabotulinumtoxina - PHYSICIAN TO INJECT UP  UNITS INTRAMUSCULARLY EVERY 90 DAYS. FOR OFFICE USE ONLY, ondansetron - Take 1 tablet (8 mg) by mouth every 8 hours if needed, pen needle, diabetic - Use 1 pen needle to inject insulin under the skin 3 times a day before meals, magnesium, potassium aspartate - Take 2 capsules by mouth once daily, prednisone - Take 4 tablets by mouth once daily for 5 days, then take 3 tablets daily for 2 days, then 2 tablets daily for 2 days, then 1 tablet daily for 2 days, proair hfa - Inhale 1-2 puffs every 4 hours if needed, resveratrol - Take 1,450 mg by mouth once daily, rizatriptan, rizatriptan - TAKE 1 TABLET BY MOUTH AT BEGINNING OF MIGRAINE. TAKE 1 BY MOUTH EVERY 2 HOURS IF MIGRAINE NOT  RESOLVED. MAXIMUM OF 3 TABLET BY MOUTH IN 24 HOURS, scopolamine - Place 1 patch on the skin every 3rd day, sulfamethoxazole-trimethoprim - TAKE 1 TABLET BY MOUTH ONCE DAILY, synthroid - Take 1 tablet (150 mcg) by mouth once daily, monoject tuberculin syringe - Use 2 syringes to inject every month as directed, tiotropium, mounjaro - INJECT 1 PEN UNDER THE SKIN ONCE WEEKLY, tramadol, trelegy ellipta - Inhale 1 puff once daily, triamcinolone - Triamcinolone Acetonide 0.025 % External Ointment  Quantity: 80   Refills: 0  Start: 9-Jun-2023, triamcinolone - Apply topically 2 times a day, turmeric - Take by mouth, and ventolin hfa - INHALE 2 PUFFS BY MOUTH EVERY 4 HOURS AS NEEDED, and the following Facility-Administered Medications: onabotulinumtoxina.    PAST MEDICAL HISTORY : She has Abnormal mammogram; Arthritis; Asthma; Sleep apnea; Astigmatism, bilateral; Bariatric surgery status; Benign neoplasm of skin of face; Bilateral presbyopia; Bilateral sensorineural hearing loss; Bilateral tinnitus; Chronic fatigue syndrome; Chronic sinusitis; Combined hyperlipidemia; Current chronic use of systemic steroids; Common variable immunodeficiency (CMS/HCC); Dermatochalasis of right upper eyelid; Diabetes mellitus type 2 without retinopathy (CMS/[facility]); Dizziness; GERD (gastroesophageal reflux disease); Heart murmur; Hemifacial spasm of left side of face; Hyperglycemia, drug-induced; Hypothyroid; Insomnia; Lump or mass in breast; Nuclear sclerosis of both eyes; Restless legs syndrome; Vitamin B12 deficiency; Vitamin D deficiency, unspecified; Class 3 severe obesity due to excess calories with serious comorbidity and body mass index (BMI) of 40.0 to 44.9 in adult (CMS/HCC); Aortic stenosis; Chronic respiratory failure (CMS/HCC); Diaphragm dysfunction; Generalized abdominal pain; Brow ptosis, bilateral; Dyspnea; Iron deficiency; Posterior tibial tendon dysfunction; Primary localized osteoarthrosis of ankle and foot; Pronation of foot;  Steatoblepharon; Plantar fasciitis; Osteoarthritis of knee; and Bronchiectasis, uncomplicated (CMS/HCC) on their problem list.    PAST SURGICAL HISTORY: She  has a past surgical history that includes Colonoscopy (11/04/2013); Tonsillectomy (11/03/2016); Thyroid surgery (11/03/2016); Gallbladder surgery (11/03/2016); Hysterectomy (11/03/2016); Total knee arthroplasty (06/07/2020); Hernia repair (12/13/2016); and Ventral hernia repair (09/17/2018).     FAMILY HISTORY: No changes since previous visit. Otherwise non-contributory as charted.     SOCIAL HISTORY  She  reports that she has quit smoking. Her smoking use included cigarettes. She has never used smokeless tobacco. She reports that she does not drink alcohol and does not use drugs. She works in [employer] at . She does have 3-4 cups of coffee with one after dinner.      PHYSICAL EXAM     Physical Examination: /69 (BP Location: Right arm, Patient Position: Sitting, BP Cuff Size: Large adult)   Pulse 67   Temp 36.6 °C (97.9 °F) (Temporal)   Wt 113 kg (250 lb)   SpO2 99%   BMI 39.16 kg/m²     PREVIOUS WEIGHTS:  Wt Readings from Last 3 Encounters:   01/24/24 113 kg (250 lb)   12/04/23 115 kg (253 lb)   11/21/23 125 kg (276 lb)     No exam today    RESULTS/DATA     Iron (ug/dL)   Date Value   12/13/2023 44   08/09/2023 58     % Saturation (%)   Date Value   12/13/2023 9 (L)     Iron Saturation (%)   Date Value   08/09/2023 13 (L)     TIBC (ug/dL)   Date Value   12/13/2023 472 (H)   08/09/2023 459 (H)     Ferritin (ug/L)   Date Value   08/09/2023 63       Bicarbonate (mmol/L)   Date Value   12/08/2023 31   07/21/2023 23   05/10/2023 22   04/30/2023 20 (L)       PAP Adherence  Need to get from DME    DIAGNOSES     Problem List and Orders  Diagnoses and all orders for this visit:  RLS (restless legs syndrome)  -     gabapentin (Neurontin) 300 mg capsule; Take 2 capsules (600 mg) by mouth once daily at bedtime.  -     Follow Up In Adult Sleep Medicine;  Future  JOSÉ MIGUEL (obstructive sleep apnea)  -     Follow Up In Adult Sleep Medicine; Future  Chronic respiratory failure, unspecified whether with hypoxia or hypercapnia (CMS/[facility])  Class 2 severe obesity due to excess calories with serious comorbidity and body mass index (BMI) of 39.0 to 39.9 in adult (CMS/[facility])        ASSESSMENT/PLAN     Ms. Morris is a 63 y.o. female and with past medical history significant for JOSÉ MIGUEL on CPAP, CVID, aortic stenosis, T2DM, asthma, right hemidiaphragm paralysis s/p b/l pacer on 6/19/23, CFS, GERD, hypothyroid.  She returns in followup to  the [facility] for their RLS and sleep apnea.    JOSÉ MIGUEL.  She has mild JOSÉ MIGUEL and is currently being treated on bilevel at 8.5/4.  She is tolerating well and finds this to be comfortable at this point in time.  Her DME is [facility].  We need to obtain a copy of her adherence report which we have tried obtain. Continue on current settings.    RLS.  This is most problematic for her at this time.  I would recommend increasing her gabapentin to 600 mg nightly to be taken at this 6 to 7 PM and see if this helps with those symptoms.  In addition she does have evidence of iron deficiency based on her recent iron test with her ferritin at less than 75.  She has been on oral iron supplements and there has been little increase in her percent iron saturation.  Given this I would recommend that we proceed with an iron infusion which she is agreeable to.  We will try to order this through one of our infusion centers.    Right hemidiaphragm paralysis. Continues to follow with Dr. Torres. Will take time to see if her diaphragm recovers. We can augment her ventilation as needed with BPAP.    Obesity.  The patient was counseled that her weight is the strongest modifiable risk factor and contributor for JOSÉ MIGUEL. She was counseled to consider weight loss options to include changes in dietary habits and activity. We briefly discussed the use of calorie  tracking smartphone apps and the use of activity meters to provide feedback that helps in losing weight.  Before initiating an exercise plan, she should carefully review the approach with her primary care provider.       Follow up: 3 months         Melani Wong MD PhD

## 2024-01-24 ENCOUNTER — OFFICE VISIT (OUTPATIENT)
Dept: SLEEP MEDICINE | Facility: CLINIC | Age: 64
End: 2024-01-24
Payer: COMMERCIAL

## 2024-01-24 VITALS
SYSTOLIC BLOOD PRESSURE: 105 MMHG | DIASTOLIC BLOOD PRESSURE: 69 MMHG | HEART RATE: 67 BPM | BODY MASS INDEX: 39.16 KG/M2 | WEIGHT: 250 LBS | TEMPERATURE: 97.9 F | OXYGEN SATURATION: 99 %

## 2024-01-24 DIAGNOSIS — G47.33 OSA (OBSTRUCTIVE SLEEP APNEA): ICD-10-CM

## 2024-01-24 DIAGNOSIS — E66.01 CLASS 2 SEVERE OBESITY DUE TO EXCESS CALORIES WITH SERIOUS COMORBIDITY AND BODY MASS INDEX (BMI) OF 39.0 TO 39.9 IN ADULT (MULTI): ICD-10-CM

## 2024-01-24 DIAGNOSIS — G25.81 RESTLESS LEGS SYNDROME: ICD-10-CM

## 2024-01-24 DIAGNOSIS — G25.81 RLS (RESTLESS LEGS SYNDROME): Primary | ICD-10-CM

## 2024-01-24 DIAGNOSIS — E61.1 IRON DEFICIENCY: ICD-10-CM

## 2024-01-24 DIAGNOSIS — J96.10 CHRONIC RESPIRATORY FAILURE, UNSPECIFIED WHETHER WITH HYPOXIA OR HYPERCAPNIA (MULTI): ICD-10-CM

## 2024-01-24 PROCEDURE — 99214 OFFICE O/P EST MOD 30 MIN: CPT | Performed by: INTERNAL MEDICINE

## 2024-01-24 PROCEDURE — 3008F BODY MASS INDEX DOCD: CPT | Performed by: INTERNAL MEDICINE

## 2024-01-24 PROCEDURE — 4010F ACE/ARB THERAPY RXD/TAKEN: CPT | Performed by: INTERNAL MEDICINE

## 2024-01-24 PROCEDURE — 1036F TOBACCO NON-USER: CPT | Performed by: INTERNAL MEDICINE

## 2024-01-24 PROCEDURE — 1126F AMNT PAIN NOTED NONE PRSNT: CPT | Performed by: INTERNAL MEDICINE

## 2024-01-24 PROCEDURE — 1159F MED LIST DOCD IN RCRD: CPT | Performed by: INTERNAL MEDICINE

## 2024-01-24 PROCEDURE — 3078F DIAST BP <80 MM HG: CPT | Performed by: INTERNAL MEDICINE

## 2024-01-24 PROCEDURE — 3074F SYST BP LT 130 MM HG: CPT | Performed by: INTERNAL MEDICINE

## 2024-01-24 RX ORDER — GABAPENTIN 300 MG/1
600 CAPSULE ORAL NIGHTLY
Qty: 60 CAPSULE | Refills: 3 | Status: SHIPPED | OUTPATIENT
Start: 2024-01-24 | End: 2024-05-15 | Stop reason: SDUPTHER

## 2024-01-24 NOTE — PATIENT INSTRUCTIONS
[facility] Sleep Medicine  [address]  [facility]  [address]  [facility] [address]    [facility] [place]  [address]  [facility] [id number] [phone]  [facility]  [address]  [address]  [facility] [address]           NAME: Lara Morris   DATE: 1/24/2024     Your Sleep Provider Today: Melani Wong MD PhD  Your Primary Care Physician: Chan Hernandez MD   Your Referring Provider: No ref. provider found    Thank you for coming to the Sleep Medicine Clinic today! Your sleep medicine provider today was: Melani Wong MD PhD Below is a summary of your treatment plan, other important information, and our contact numbers:  If you need to schedule an appointment, please call [phone])  If you need general assistance (e.g. forms completed, general questions), please call my , Kayla, at [phone].  If you have a medical question about your sleep issues, please contact our nurses, Jo Ann or Jaylin at [phone].   You can also contact us through [employer].      DIAGNOSIS:   1. RLS (restless legs syndrome)  gabapentin (Neurontin) 300 mg capsule              TREATMENT PLAN     Increased your gabapentin to 600mg nightly for the RLS  We will coordinate an iron infusion at one of our infusion centers  Continue the BPAP at 8.5/4  We will get your BPAP report from [facility]    Follow-up Appointment:   Followup with me in 3 months      IMPORTANT INFORMATION     Call 911 for medical emergencies.  Our offices are generally open from Monday-Friday, 9 am - 5 pm.  If you need to get in touch with me, you may either call me and my team(number is below) or you can use [employer].  If a referral for a test, for CPAP, or for another specialist was made, and you have not heard about scheduling this within a week, please call scheduling at [phone]).  If you are unable to make your  appointment for clinic or an overnight study, kindly call the office at least 48 hours in advance to cancel and reschedule.  If you are on CPAP, please bring your device's card or the device to each clinic appointment.   There are no supporting services by either the sleep doctors or their staff on weekends and Holidays, or after 5 PM on weekdays.   If you have been asked to come to a sleep study, make sure you bring toiletries, a comfy pillow, and any nighttime medications that you may regularly take. Also be sure to eat dinner before you arrive. We generally do not provide meals.      PRESCRIPTIONS     We require 7 days advanced notice for prescription refills. If we do not receive the request in this time, we cannot guarantee that your medication will be refilled in time.      IMPORTANT PHONE NUMBERS      scheduling for medical [phone]   Sleep Medicine Clinic Fax: [phone]  Appointments (for Pediatric Sleep Clinic): [phone] - option 1  Appointments (for Adult Sleep Clinic): [phone] - option 2  Appointments (For Sleep Studies): [phone] - option 3  Behavioral Sleep Medicine: [phone]  Bariatric Surgery: [phone] ( Bariatric Surgery Website)   Sleep Surgery: [phone]  ENT (Otolaryngology): [phone]  Headache Clinic (Neurology): [phone]  Neurology: [phone]  Psychiatry: [phone]  Pulmonary Function Testing (PFT) Center: [phone]  Pulmonary Medicine: [phone]  [employer] (DME): [phone]  [employer] (DME): [phone]   (DME): [phone]      COMMON PROVIDERS WE REFER TO     For Weight Loss - Dr. Stephen Seth - Call [phone]  For Sleep Surgery - Dr. August Godinez - Call [phone]      OUR ADULT SLEEP MEDICINE TEAM   Please do not hesitate to call the office or sleep nurse with any questions between appointments:    Adult Sleep  Nurses (Jaylin Wilson, RN and Jo Ann Mcgarry, RN):  For clinical questions and refilling prescriptions: [phone]  Email sleep diaries and other documents at: [email or web address]    Adult Sleep Medicine Secretaries:  Lyn Freeman (For [place]abil[place]/Krise/Strohl/Yeh/Cabrera):   P: [phone]  F: [phone]  Kayla Gregg (For Wong/Guggenbiller): P: [phone]  Fax: [phone]  Kailee Dubon (For Jurcevic/Blank): P: [phone]  F: [phone]  Kathrin Finley (For [place]): P: [phone]  F: [phone]  Ashlee Mcarthur (For Karyna/Beau/Zakhary): P: [phone]  F: [phone]  Olga Brooke (For [place]/Butcher): P: [phone]  F: [phone]     Adult Sleep Medicine Advanced Practice Providers:  Cm Hare ([place], [place])  Karlie Yanez ([facility])  Lesa Pérez CNP ([place], [place], [place])  Rocoí Henry CNP ([place], [place], [place])  Briseida Garcia ([place], [place], [place])  Maninder Butcher CNP ([facility])        OUR SLEEP TESTING LOCATIONS     Our team will contact you to schedule your sleep study, however, you can contact us as follow:  Main Phone Line (scheduling only): [phone]), option 3  Adult and Pediatric Locations  [facility] (6 years and older): [facility] - 4th floor ([address] [place] [address]) After hours line: [phone]  [facility] (Main campus: All ages): [facility], 6th floor. After hours line: [phone]  [facility] (5 years and older; younger considered on case-by-case basis): [address]; [address], [address]. Scheduling  After hours line: [phone]   [place] (6 years and older): [address]; [address]; [address]   [place] (6 years and older): [address] [facility], [address]  After hours line: [phone]   [place] (13 years and older) in [place]: [address], 2nd floor  After hours  "line: [phone]   [place] (13 year and older): [address]  After hours line: [phone]     Adult Only Locations:   [place] (18 years and older): [address] [place] [address], 2nd floor   [place] (18 years and older): [address] [facility]; 4th floor  After hours line: [phone]  [facility] (18 years and older) at [place]: [address] [place] [address]  After hours line: [phone]          CONTACTING YOUR SLEEP MEDICINE PROVIDER     Send a message directly to your provider through \"My Chart\", which is the email service through your  Records Account: [email or web address]   Call [phone] and leave a message. One of the administrative assistants will forward the message to your sleep medicine provider through \"My Chart\" and/or email.     Your sleep medicine provider for this visit was: Melani Wong MD PhD        "

## 2024-01-25 ENCOUNTER — DOCUMENTATION (OUTPATIENT)
Dept: INFUSION THERAPY | Facility: CLINIC | Age: 64
End: 2024-01-25
Payer: COMMERCIAL

## 2024-01-25 PROCEDURE — RXMED WILLOW AMBULATORY MEDICATION CHARGE

## 2024-01-25 RX ORDER — ALBUTEROL SULFATE 0.83 MG/ML
3 SOLUTION RESPIRATORY (INHALATION) AS NEEDED
Status: CANCELLED | OUTPATIENT
Start: 2024-02-07

## 2024-01-25 RX ORDER — FAMOTIDINE 10 MG/ML
20 INJECTION INTRAVENOUS ONCE AS NEEDED
Status: CANCELLED | OUTPATIENT
Start: 2024-02-07

## 2024-01-25 RX ORDER — EPINEPHRINE 0.3 MG/.3ML
0.3 INJECTION SUBCUTANEOUS EVERY 5 MIN PRN
Status: CANCELLED | OUTPATIENT
Start: 2024-02-07

## 2024-01-25 NOTE — PROGRESS NOTES
Patient to be scheduled for acute (Feraheme / Ferumoxytol) infusions.   For Diagnosis: Iron Deficiency Anemia  Labs…  Iron Studies completed on: _12/13/23  Iron 44  Ferritin 63  TIBC 472  % saturation 9_  Hgb: __11.8_ (<15g/dl)    This result meets treatment criteria.

## 2024-01-26 ENCOUNTER — PHARMACY VISIT (OUTPATIENT)
Dept: PHARMACY | Facility: CLINIC | Age: 64
End: 2024-01-26
Payer: COMMERCIAL

## 2024-02-01 PROCEDURE — RXMED WILLOW AMBULATORY MEDICATION CHARGE

## 2024-02-06 ENCOUNTER — SPECIALTY PHARMACY (OUTPATIENT)
Dept: PHARMACY | Facility: CLINIC | Age: 64
End: 2024-02-06

## 2024-02-07 ENCOUNTER — INFUSION (OUTPATIENT)
Dept: INFUSION THERAPY | Facility: CLINIC | Age: 64
End: 2024-02-07
Payer: COMMERCIAL

## 2024-02-07 ENCOUNTER — TELEMEDICINE (OUTPATIENT)
Dept: PHARMACY | Facility: HOSPITAL | Age: 64
End: 2024-02-07
Payer: COMMERCIAL

## 2024-02-07 ENCOUNTER — PHARMACY VISIT (OUTPATIENT)
Dept: PHARMACY | Facility: CLINIC | Age: 64
End: 2024-02-07
Payer: COMMERCIAL

## 2024-02-07 VITALS
OXYGEN SATURATION: 100 % | RESPIRATION RATE: 17 BRPM | TEMPERATURE: 97.7 F | WEIGHT: 250 LBS | BODY MASS INDEX: 39.16 KG/M2 | SYSTOLIC BLOOD PRESSURE: 128 MMHG | DIASTOLIC BLOOD PRESSURE: 74 MMHG | HEART RATE: 65 BPM

## 2024-02-07 DIAGNOSIS — E11.9 DIABETES MELLITUS TYPE 2 WITHOUT RETINOPATHY (MULTI): Primary | ICD-10-CM

## 2024-02-07 DIAGNOSIS — E61.1 IRON DEFICIENCY: ICD-10-CM

## 2024-02-07 DIAGNOSIS — G25.81 RESTLESS LEGS SYNDROME: ICD-10-CM

## 2024-02-07 PROCEDURE — 96365 THER/PROPH/DIAG IV INF INIT: CPT | Performed by: NURSE PRACTITIONER

## 2024-02-07 RX ORDER — FAMOTIDINE 10 MG/ML
20 INJECTION INTRAVENOUS ONCE AS NEEDED
Status: CANCELLED | OUTPATIENT
Start: 2024-02-14

## 2024-02-07 RX ORDER — EPINEPHRINE 0.3 MG/.3ML
0.3 INJECTION SUBCUTANEOUS EVERY 5 MIN PRN
Status: CANCELLED | OUTPATIENT
Start: 2024-02-14

## 2024-02-07 RX ORDER — GABAPENTIN 100 MG/1
100 CAPSULE ORAL 2 TIMES DAILY
COMMUNITY
End: 2024-03-20 | Stop reason: WASHOUT

## 2024-02-07 RX ORDER — ALBUTEROL SULFATE 0.83 MG/ML
3 SOLUTION RESPIRATORY (INHALATION) AS NEEDED
Status: CANCELLED | OUTPATIENT
Start: 2024-02-14

## 2024-02-07 ASSESSMENT — ENCOUNTER SYMPTOMS
DIABETIC ASSOCIATED SYMPTOMS: 0
SHORTNESS OF BREATH: 1
FATIGUE: 1

## 2024-02-07 ASSESSMENT — PAIN SCALES - GENERAL: PAINLEVEL: 0-NO PAIN

## 2024-02-07 NOTE — ASSESSMENT & PLAN NOTE
Patients diabetes is well controlled with most recent A1c of 5.5% on 11/15/23 (Goal < 7%). She typically only checks her FBG daily and her PPBG when she feels like her blood glucose may be low. She has a close relationship with her providers and can obtain refills at any time.   Continue:   Novolog 100 units/mL sliding scale  6 UNITS FOR BG -180  8 UNITS -220  10 UNITS -260  12 UNITS -300  14 UNITS -350  16 UNITS -400  18 UNITS  OR HIGHER  Metformin 1000 mg BID with meals  Mounjaro 7.5 mg/0.5 mL weekly  Diabetes supplies: Accuchek Zakiya test strips, pen needles 32 gauge  Compliance at present is estimated to be excellent.   Education Provided to Patient: Signs and symptoms of hypo/hyperglycemia  Patient Blood Sugar Goals  Fasting blood sugar (before eating, at least 8 hours fasting): 70 - 130 mg/dL  Postprandial blood sugar (throughout the day after eating): less than 180 mg/dL  A1c: less than 7%   Follow-up: 1 year for VBID renewal

## 2024-02-07 NOTE — PATIENT INSTRUCTIONS
Today :We administered ferumoxytol (Feraheme) 510 mg in sodium chloride 0.9% 100 mL IV.     For:   1. Iron deficiency    2. Restless legs syndrome         Your next appointment is due in:  One Week        Please read the  Medication Guide that was given to you and reviewed during todays visit.     (Tell all doctors including dentists that you are taking this medication)     Go to the emergency room or call 911 if:  -You have signs of allergic reaction:   -Rash, hives, itching.   -Swollen, blistered, peeling skin.   -Swelling of face, lips, mouth, tongue or throat.   -Tightness of chest, trouble breathing, swallowing or talking     Call your doctor:  - If IV / injection site gets red, warm, swollen, itchy or leaks fluid or pus.     (Leave dressing on your IV site for at least 2 hours and keep area clean and dry  - If you get sick or have symptoms of infection or are not feeling well for any reason.    (Wash your hands often, stay away from people who are sick)  - If you have side effects from your medication that do not go away or are bothersome.     (Refer to the teaching your nurse gave you for side effects to call your doctor about)    - Common side effects may include:  stuffy nose, headache, feeling tired, muscle aches, upset stomach  - Before receiving any vaccines     - Call the [facility] at   If:  - You get sick, are on antibiotics, have had a recent vaccine, have surgery or dental work and your doctor wants your visit rescheduled.  - You need to cancel and reschedule your visit for any reason. Call at least 2 days before your visit if you need to cancel.   - Your insurance changes before your next visit.    (We will need to get approval from your new insurance. This can take up to two weeks.)     The [facility] is opened Monday thru Friday. We are closed on weekends and holidays.   Voice mail will take your call if the center is closed. If you leave a message please allow  24 hours for a call back during weekdays. If you leave a message on a weekend/holiday, we will call you back the next business day.

## 2024-02-07 NOTE — PROGRESS NOTES
[facility]   infusion Clinic Note   Date: 2024   Name: Lara Morris  : 1960   MRN: [id number]         Reason for Visit: OP Infusion (fereheme)         Visit Type: INFUSION       Ordered By: Dr Wong      Accompanied by:Self      Diagnosis: Iron deficiency    Restless legs syndrome       Allergies:   Allergies as of 2024 - Reviewed 2024   Allergen Reaction Noted    Adhesive Hives 2023    Animal dander Other and Runny nose 2023    Atorvastatin Other 2023    Clonidine Hives 2023    Codeine Nausea Only and Other 2023    Gamunex-c [immune globul g-gly-iga avg 46] Hives 2023    House dust Runny nose 2023    Immune globulin Unknown 10/31/2023    Immune globulin,gamma (igg) human Unknown 10/31/2023    Penicillins Hives 10/31/2023    Ropinirole Other 2023    Ciprofloxacin Rash 2023    Erythromycin Rash 2023         Current Medications has a current medication list which includes the following prescription(s): acetaminophen, amoxicillin-potassium clavulanate, arnuity ellipta, azelastine, bd esperanza 2nd gen pen needle, benzonatate, accu-chek francis plus test strp, accu-chek francis plus test strp, botox, cetirizine, cholecalciferol, cyanocobalamin, cyanocobalamin (vitamin b-12), eletriptan, epinephrine, epinephrine, esomeprazole, ezetimibe, fluticasone, fluticasone propion-salmeterol, fluticasone-umeclidin-vilanter, gabapentin, ginseng, ginseng, hizentra, hydroxyzine hcl, hyoscyamine, ibuprofen, immune globulin (human), insulin aspart, ipratropium-albuterol, levothyroxine, lidocaine-prilocaine, lisinopril, magnesium oxide, nucala, metformin, montelukast, multivitamin, monoject hypodermic needles, paxlovid, nucala, olopatadine, omega 3-dha-epa-fish oil, onabotulinumtoxina, ondansetron, pen needle, diabetic, magnesium, potassium aspartate, prednisone, proair hfa, resveratrol, rizatriptan, rizatriptan, scopolamine,  sulfamethoxazole-trimethoprim, synthroid, monoject tuberculin syringe, tiotropium, mounjaro, tramadol, trelegy ellipta, triamcinolone, triamcinolone, turmeric, and ventolin hfa, and the following Facility-Administered Medications: ferumoxytol (Feraheme) 510 mg in sodium chloride 0.9% 100 mL IV and onabotulinumtoxina.       Vitals:   Vitals:    02/07/24 0711   BP: 139/74   Pulse: 72   Resp: 16   Temp: 36.5 °C (97.7 °F)   TempSrc: Temporal   SpO2: 100%   Weight: 113 kg (250 lb)   PainSc: 0-No pain             Infusion Pre-procedure Checklist:   - Allergies reviewed: yes   - Medications reviewed: yes       - Previous reaction to current treatment: n/a      Assess patient for the concerns below. Document provider notification as appropriate.  - Active or recent infection with/without current antibiotic use: n/a  - Recent or planned invasive dental work: n/a  - Recent or planned surgeries: n/a  - Recently received or plans to receive vaccinations: n/a  - Has treatment related toxicities: n/a  - Is pregnant:  n/a      Pain: 0   - Is the pain different from normal: no   - Is the pain tolerable: n/a   - Is your Doctor aware:  n/a      Labs: N/A         Fall Risk Screening: Vidales Fall Risk  History of Falling, Immediate or Within 3 Months: No  Secondary Diagnosis: Yes  Ambulatory Aid: Walks without aid/bedrest/nurse assist  Intravenous Therapy/Heparin Lock: Yes  Gait/Transferring: Normal/bedrest/immobile  Mental Status: Oriented to own ability  [place] Fall Risk Score: 35       Review Of Systems:  Review of Systems   Constitutional:  Positive for fatigue.   Respiratory:  Positive for shortness of breath.    Musculoskeletal:         Pt has diaphragm pacer         Infusion Readiness:   - Assessment Concerns Related to Infusion: No  - Provider notified: n/a      Document Below Only If Indicated:   New Patient Education:    NEW PATIENT MEDICATION EDUCATION PT PROVIDED WITH WRITTEN (RUNform PT EDUCATION SHEET) AND VERBAL EDUCATION  REGARDING MEDICATION GIVEN. VERIFIED MEDICATION NAME WITH PATIENT AND DISCUSSED REASON FOR USE. BRIEFLY DISCUSSED HOW MEDICATION WORKS AND EDUCATED ON GOAL OF TREATMENT, FREQUENCY OF TREATMENT, ADVERSE RXN'S AND COMMON SIDE EFFECTS TO MONITOR FOR. INSTRUCTED PT TO ASSURE THAT ALL PROVIDERS INCLUDING DENTISTS ARE AWARE OF MEDICATION RECEIVED. DISCUSSED FLOW OF VISIT AND ORIENTED TO INFUSION CENTER. PT VERBALIZES UNDERSTANDING. CALL LIGHT PROVIDED AND PT AWARE TO ALERT STAFF OF ANY CONCERNS DURING TREATMENT.        Treatment Conditions & Drug Specific Questions:    Feraheme        Weight Based Drug Calculations:    WEIGHT BASED DRUGS: NOT APPLICABLE / FLAT DOSE          Initiated By: Denise Hernandez RN   Time: 7:59 AM     We administered ferumoxytol (Feraheme) 510 mg in sodium chloride 0.9% 100 mL IV.   0845 Pt discharged and is free of signs or symptoms of adverse reaction.

## 2024-02-07 NOTE — PROGRESS NOTES
[facility] (VBID)    Lara Morris is a 63 y.o. female was referred to Clinical Pharmacy Team to complete a comprehensive medication review (CMR) with a pharmacist as part of the Value Based [employer] diabetes program.      Referring Provider: Chan Hernandez MD    Subjective   Allergies   Allergen Reactions    Adhesive Hives     All adhesives (including clonidine patches) caused blistering hives    Gamunex-C [Immune Globul G-Gly-Iga Avg 46] Anaphylaxis    Immune Globulin Hives and Shortness of breath    Immune Globulin,Gamma (Igg) Human Hives and Shortness of breath    Animal Dander Runny nose     Sneezing    Atorvastatin Myalgia    Codeine Nausea/vomiting    House Dust Runny nose     Sneezing    Ropinirole Hives    Tramadol Other     Nausea only    Ciprofloxacin Hives and Rash    Erythromycin Hives and Rash       [facility]  [address]  [place] [address]  Phone: [phone] Fax: [phone]    [id number] [place] [id number] [facility] AT [place] [id number]  Phone: [phone] Fax: [phone]      Diabetes  She presents for her follow-up diabetic visit. She has type 2 diabetes mellitus. Her disease course has been stable. There are no hypoglycemic associated symptoms. (Might have false hypoglycemic symptoms that prompt her to check PPBG, but rarely) There are no diabetic associated symptoms. There are no hypoglycemic complications.       Objective     There were no vitals taken for this visit.     LAB  Lab Results   Component Value Date    BILITOT 0.4 12/08/2023    CALCIUM 9.4 12/08/2023    CO2 31 12/08/2023     12/08/2023    CREATININE 1.03 12/08/2023    GLUCOSE 74 12/08/2023    ALKPHOS 70 12/08/2023    K 4.7 12/08/2023    PROT 6.6 12/08/2023     12/08/2023    AST 12 12/08/2023    ALT 13 12/08/2023    BUN 23 12/08/2023    ANIONGAP 10 12/08/2023     04/30/2023     ALBUMIN 3.7 12/08/2023    LIPASE 22 08/22/2019    GFRF 82 07/21/2023     Lab Results   Component Value Date    TRIG 207 (H) 09/01/2020    CHOL 239 (H) 09/01/2020    HDL 40.3 09/01/2020     Lab Results   Component Value Date    HGBA1C 5.5 11/15/2023       Current Outpatient Medications on File Prior to Visit   Medication Sig Dispense Refill    acetaminophen (Tylenol) 325 mg tablet Take 2 tablets (650 mg) by mouth every 6 hours if needed for mild pain (1 - 3).      amoxicillin-potassium clavulanate (Augmentin XR) 1,000-62.5 mg 12 hr tablet Take 2 tablets (2,000 mg of amoxicillin) by mouth every 12 hours. (Patient taking differently: Take 2 tablets (2,000 mg of amoxicillin) by mouth every 12 hours if needed (for 1st symptom of infection).) 56 tablet 2    azelastine (Astelin) 137 mcg (0.1 %) nasal spray USE 1-2 SPRAYS IN EACH NOSTRIL ONCE DAILY TO TWO TIMES A DAY AS NEEDED FOR NASAL DRAINAGE 90 mL 3    blood sugar diagnostic (Accu-Chek Zakiya Plus test strp) strip Use one strip 3 times daily as directed 300 strip 3    cetirizine (ZyrTEC) 10 mg tablet Take 1 tablet (10 mg) by mouth 2 times a day.      cholecalciferol (Vitamin D-3) 25 MCG (1000 UT) tablet Take 1 tablet (25 mcg) by mouth once daily.      cyanocobalamin (Vitamin B-12) 1,000 mcg/mL injection INJECT 1 ML UNDER THE SKIN EVERY 3 WEEKS 1 mL 12    EPINEPHrine (Epipen) 0.3 mg/0.3 mL injection syringe USE AS NEEDED AS DIRECTED 10 each 6    esomeprazole (NexIUM) 40 mg DR capsule TAKE 1 CAPSULE BY MOUTH EVERY MORNING BEFORE MEALS 90 capsule 3    ezetimibe (Zetia) 10 mg tablet Take 1 tablet (10 mg) by mouth once daily.      fluticasone (Flonase) 50 mcg/actuation nasal spray Administer 2 sprays into each nostril once daily.      fluticasone-umeclidin-vilanter (TRELEGY-ELLIPTA) 200-62.5-25 mcg blister with device INHALE 1 PUFF BY MOUTH ONCE DAILY 60 each 11    gabapentin (Neurontin) 100 mg capsule Take 1 capsule (100 mg) by mouth 2 times a day. In the morning and  "afternoon      gabapentin (Neurontin) 300 mg capsule Take 2 capsules (600 mg) by mouth once daily at bedtime. (Patient taking differently: Take 2 capsules (600 mg) by mouth once daily at bedtime. For restless leg syndrome) 60 capsule 3    ginseng 100 mg capsule Take 100 mg by mouth once daily.      Hizentra subcutaneous infusion Inject under the skin 1 (one) time per week.      ibuprofen 800 mg tablet Take 1 tablet (800 mg) by mouth 2 times a day as needed.      insulin aspart (NovoLOG) 100 unit/mL (3 mL) pen INJECT UP TO 18 UNITS UNDER THE SKIN THREE TIMES A DAY BEFORE MEALS (6 UNITS FOR BLOOD GLUCOSE -180, 8 UNITS -220, 10 UNITS -260, 12 UNITS -300, 14 UNITS -350, 16 UNITS -400, 18 UNITS  OR HIGHER). 15 mL 3    ipratropium-albuteroL (Duo-Neb) 0.5-2.5 mg/3 mL nebulizer solution Take 3 mL by nebulization 4 times a day as needed for shortness of breath or wheezing.      levothyroxine (Synthroid, Levoxyl) 150 mcg tablet TAKE 1 TABLET BY MOUTH ONCE DAILY 90 tablet 3    lidocaine-prilocaine (Emla) 2.5-2.5 % cream Apply 1 application once a day as needed 60 g 6    mepolizumab (Nucala) 100 mg/mL auto-injector Inject 100 mg (1 pen) under the skin every 4 weeks as directed 1 mL 12    metFORMIN (Glucophage) 1,000 mg tablet TAKE 1 TABLET BY MOUTH TWO TIMES A DAY WITH FOOD 180 tablet 3    montelukast (Singulair) 10 mg tablet TAKE 1 TABLET BY MOUTH ONCE DAILY 90 tablet 5    MULTIVITAMIN ORAL Take 1 tablet by mouth once daily.      needle, disp, 25 gauge (Monoject Hypodermic Needles) 25 gauge x 5/8\" needle Use as directed for injections, 2 every four weeks 2 each 11    omega 3-dha-epa-fish oil (Fish OiL) 1,200 (144-216) mg capsule Take 1 capsule (1,200 mg) by mouth once daily.      onabotulinumtoxinA (Botox) 100 unit injection PHYSICIAN TO INJECT UP  UNITS INTRAMUSCULARLY EVERY 90 DAYS. FOR OFFICE USE ONLY 1 each 3    ondansetron (Zofran) 8 mg tablet Take 1 tablet (8 mg) by " "mouth every 8 hours if needed for vomiting or nausea.      pen needle, diabetic (BD Cici 2nd Gen Pen Needle) 32 gauge x 5/32\" needle Use 1 pen needle to inject insulin under the skin 3 times a day before meals 300 each 3    potassium &magnesium aspartate (magnesium, potassium aspartate) 250-250 mg capsule Take 2 capsules by mouth once daily.      RESVERATROL ORAL Take 1,450 mg by mouth once daily.      rizatriptan (Maxalt) 10 mg tablet TAKE 1 TABLET BY MOUTH AT BEGINNING OF MIGRAINE. TAKE 1 BY MOUTH EVERY 2 HOURS IF MIGRAINE NOT RESOLVED. MAXIMUM OF 3 TABLET BY MOUTH IN 24 HOURS 45 tablet 11    sulfamethoxazole-trimethoprim (Bactrim DS) 800-160 mg tablet TAKE 1 TABLET BY MOUTH ONCE DAILY 30 tablet 12    syringe, disposable, (Monoject Tuberculin Syringe) 1 mL syringe Use 2 syringes to inject every month as directed 2 each 11    tirzepatide (Mounjaro) 7.5 mg/0.5 mL pen injector INJECT 1 PEN UNDER THE SKIN ONCE WEEKLY 2 mL 2    Ventolin HFA 90 mcg/actuation inhaler INHALE 2 PUFFS BY MOUTH EVERY 4 HOURS AS NEEDED (Patient taking differently: Inhale 2 puffs every 4 hours if needed for wheezing.) 54 g 11    [DISCONTINUED] BD Cici 2nd Gen Pen Needle 32 gauge x 5/32\" needle Inject 1 Pen needle under the skin 3 times a day before meals. With insulin 300 each 3    [DISCONTINUED] blood sugar diagnostic (Accu-Chek Zakyia Plus test strp) strip 1 strip by Does not apply route 3 times a day. 300 strip 3    [DISCONTINUED] Botox 100 unit injection Inject 28 Units into the muscle every 3 months. On side of left side of you face      [DISCONTINUED] eletriptan (Relpax) 40 mg tablet Take 1 tablet (40 mg) by mouth 1 time if needed for migraine (after infusions). Can take another dose in 2 hours as needed      [DISCONTINUED] magnesium oxide (Mag-Ox) 400 mg (241.3 mg magnesium) tablet Take 1 tablet (400 mg) by mouth once daily.      [DISCONTINUED] ProAir HFA 90 mcg/actuation inhaler Inhale 1-2 puffs every 4 hours if needed for shortness of " breath or wheezing.      [DISCONTINUED] Arnuity Ellipta 100 mcg/actuation inhaler       [DISCONTINUED] benzonatate (Tessalon) 200 mg capsule       [DISCONTINUED] cyanocobalamin, vitamin B-12, 1,000 mcg/mL kit Inject 1 mL as directed every 21 (twenty-one) days.      [DISCONTINUED] EPINEPHrine 0.3 mg/0.3 mL injection syringe Inject 0.3 mL (0.3 mg) as directed if needed for anaphylaxis.      [DISCONTINUED] fluticasone propion-salmeteroL (Advair Diskus) 250-50 mcg/dose diskus inhaler       [DISCONTINUED] GINSENG ORAL Take by mouth.      [DISCONTINUED] hydrOXYzine HCL (Atarax) 25 mg tablet       [DISCONTINUED] hyoscyamine 0.125 mg disintegrating tablet       [DISCONTINUED] immune globulin, human, (Gammagard Liquid 10%) infusion Infuse into a venous catheter. iv ig every 3 weeks      [DISCONTINUED] lisinopril 10 mg tablet       [DISCONTINUED] nirmatrelvir-ritonavir (Paxlovid) 300 mg (150 mg x 2)-100 mg tablet therapy pack Follow directions per packaging, take 3 tablets by mouth twice daily 30 tablet 0    [DISCONTINUED] Nucala 100 mg/mL auto-injector Inject 1 Syringe (100 mg) under the skin every 28 (twenty-eight) days.      [DISCONTINUED] olopatadine (Pataday) 0.2 % ophthalmic solution Administer 1 drop into both eyes once daily as needed.      [DISCONTINUED] predniSONE (Deltasone) 10 mg tablet Take 4 tablets by mouth once daily for 5 days, then take 3 tablets daily for 2 days, then 2 tablets daily for 2 days, then 1 tablet daily for 2 days 32 tablet 0    [DISCONTINUED] rizatriptan (Maxalt) 10 mg tablet       [DISCONTINUED] scopolamine (Transderm-Scop) 1 mg over 3 days patch 3 day Place 1 patch on the skin every 3rd day.      [DISCONTINUED] Synthroid 150 mcg tablet Take 1 tablet (150 mcg) by mouth once daily. 90 tablet 3    [DISCONTINUED] tiotropium (Spiriva Respimat) 2.5 mcg/actuation inhaler       [DISCONTINUED] traMADol (Ultram) 50 mg tablet       [DISCONTINUED] Trelegy Ellipta 200-62.5-25 mcg blister with device  Inhale 1 puff once daily.      [DISCONTINUED] triamcinolone (Kenalog) 0.025 % ointment Triamcinolone Acetonide 0.025 % External Ointment  Quantity: 80   Refills: 0  Start: 9-Jun-2023      [DISCONTINUED] triamcinolone (Kenalog) 0.1 % ointment Apply topically 2 times a day.      [DISCONTINUED] TURMERIC ORAL Take by mouth.       Current Facility-Administered Medications on File Prior to Visit   Medication Dose Route Frequency Provider Last Rate Last Admin    onabotulinumtoxinA (Botox) injection 30 Units  30 Units intramuscular Once Ector Tristan MD            SECONDARY PREVENTION  - Statin? no, muscle spasms from all different   - ACE-I/ARB? no, lisinopril was stopped d/t controlled BP    FBG (avg): 120-145; today ~130  PPBG (usually only checked if hypoglycemic): 140-150 (sometimes might have symptoms, never had BG <90)  Lost 50 lbs    ASSESSMENT/PLAN:   Employee Health Diabetes Program (VBID)  - Patient enrolled in  Employee diabetes program for $0 co-pays on diabetes medications/supplies. Enrollment should be active in 2-4 weeks.  - Requested VBID enrollment date: 2/7/24    Assessment/Plan   Problem List Items Addressed This Visit       Diabetes mellitus type 2 without retinopathy (CMS/HCC) - Primary     Patients diabetes is well controlled with most recent A1c of 5.5% on 11/15/23 (Goal < 7%). She typically only checks her FBG daily and her PPBG when she feels like her blood glucose may be low. She has a close relationship with her providers and can obtain refills at any time.   Continue:   Novolog 100 units/mL sliding scale  6 UNITS FOR BG -180  8 UNITS -220  10 UNITS -260  12 UNITS -300  14 UNITS -350  16 UNITS -400  18 UNITS  OR HIGHER  Metformin 1000 mg BID with meals  Mounjaro 7.5 mg/0.5 mL weekly  Diabetes supplies: Accuchek Zakiya test strips, pen needles 32 gauge  Compliance at present is estimated to be excellent.   Education Provided to Patient: Signs  and symptoms of hypo/hyperglycemia  Patient Blood Sugar Goals  Fasting blood sugar (before eating, at least 8 hours fasting): 70 - 130 mg/dL  Postprandial blood sugar (throughout the day after eating): less than 180 mg/dL  A1c: less than 7%   Follow-up: 1 year for VBID renewal            Continue all meds under the continuation of care with the referring provider and clinical pharmacy team.    Aggie Kirkpatrick, Faisal     Verbal consent to manage patient's drug therapy was obtained from [the patient and/or an individual authorized to act on behalf of a patient]. They were informed they may decline to participate or withdraw from participation in pharmacy services at any time.

## 2024-02-08 NOTE — PROGRESS NOTES
I reviewed the progress note and agree with the resident’s findings and plans as written. Case discussed with resident.    Ayla Joel, PharmD

## 2024-02-09 ENCOUNTER — PHARMACY VISIT (OUTPATIENT)
Dept: PHARMACY | Facility: CLINIC | Age: 64
End: 2024-02-09

## 2024-02-09 PROBLEM — D50.9 IRON DEFICIENCY ANEMIA: Status: ACTIVE | Noted: 2024-02-09

## 2024-02-09 PROCEDURE — RXMED WILLOW AMBULATORY MEDICATION CHARGE

## 2024-02-09 RX ORDER — HEPARIN SODIUM,PORCINE/PF 10 UNIT/ML
50 SYRINGE (ML) INTRAVENOUS AS NEEDED
Status: CANCELLED | OUTPATIENT
Start: 2024-02-09

## 2024-02-09 RX ORDER — HEPARIN 100 UNIT/ML
500 SYRINGE INTRAVENOUS AS NEEDED
Status: CANCELLED | OUTPATIENT
Start: 2024-02-09

## 2024-02-12 ENCOUNTER — SPECIALTY PHARMACY (OUTPATIENT)
Dept: PHARMACY | Facility: CLINIC | Age: 64
End: 2024-02-12

## 2024-02-12 PROCEDURE — RXMED WILLOW AMBULATORY MEDICATION CHARGE

## 2024-02-12 NOTE — PROGRESS NOTES
"[facility] Clinical Note    Lara Morris is a 63 y.o. female, who is starting the specialty pharmacy service for management of: Asthma Core with status of: (Enrolled)  Support and Service types:   Clinical Management  Refill Management  Benefits and PA Management    Lara was contacted on 2/12/2024.    Refer to the encounter summary report for documentation details about patient counseling and education.      Reassessment  Patient feels that their medication (Nucala) is currently working effectively. She stated she last used her rescue inhaler a few weeks ago when \"it was really cold\". She reports no side effects or missed doses.    The importance of adherence was discussed with the patient and they were advised to take the medication as prescribed by their provider. Lara was encouraged to call her physician's office if they have a question regarding a missed dose.     Conclusion  Rate your quality of life on scale of 1-10: 9  Rate your satisfaction with  Specialty Pharmacy on scale of 1-10: 8    Patient advised to contact the pharmacy if there are any changes to her medication list, including prescriptions, OTC medications, herbal products, or supplements. Patient was advised of [facility]’s dispensing process, refill timeline, contact information ([phone]), and patient management follow up. Patient confirmed understanding of education conducted during assessment. All patient questions and concerns were addressed to the best of my ability. Patient was encouraged to contact the specialty pharmacy with any questions or concerns.    Confirmed follow-up outreaches are properly scheduled. Reviewed goals of therapy in the program targets.    Lizet Holguin, PharmD   "

## 2024-02-13 ENCOUNTER — PHARMACY VISIT (OUTPATIENT)
Dept: PHARMACY | Facility: CLINIC | Age: 64
End: 2024-02-13
Payer: COMMERCIAL

## 2024-02-13 ENCOUNTER — HOME INFUSION (OUTPATIENT)
Dept: INFUSION THERAPY | Age: 64
End: 2024-02-13
Payer: COMMERCIAL

## 2024-02-13 NOTE — PROGRESS NOTES
Review of chart - pt with order for Hizentra 24gm weekly. Order is good for 6 fills total. Auth is good through 7/24/24. February FOTM verified. Pt has been set up with copay assistance program.    Pt contacted, will need doses for pickup on 2/16. Requesting 9mm needle sets.     Pharmacy to dispense the following 2/15 and for pt pickup 2/16:  1x 30gm tube of EMLA  8x Hizentra 10gm/50mL vials  4x Hizentra 4gm/20mL vials  For DOS 2/16-3/14     Pt to  from pharmacy - do not deliver to pt     FU 3/6 check next delivery date, check inventory and needs. Next dose around 3/15

## 2024-02-14 ENCOUNTER — INFUSION (OUTPATIENT)
Dept: INFUSION THERAPY | Facility: CLINIC | Age: 64
End: 2024-02-14
Payer: COMMERCIAL

## 2024-02-14 ENCOUNTER — TRANSCRIBE ORDERS (OUTPATIENT)
Dept: INFUSION THERAPY | Facility: CLINIC | Age: 64
End: 2024-02-14

## 2024-02-14 VITALS
SYSTOLIC BLOOD PRESSURE: 142 MMHG | OXYGEN SATURATION: 99 % | TEMPERATURE: 97.4 F | RESPIRATION RATE: 16 BRPM | DIASTOLIC BLOOD PRESSURE: 83 MMHG | WEIGHT: 249 LBS | BODY MASS INDEX: 39 KG/M2 | HEART RATE: 68 BPM

## 2024-02-14 DIAGNOSIS — G25.81 RESTLESS LEGS SYNDROME: ICD-10-CM

## 2024-02-14 DIAGNOSIS — E61.1 IRON DEFICIENCY: ICD-10-CM

## 2024-02-14 PROCEDURE — 96365 THER/PROPH/DIAG IV INF INIT: CPT | Performed by: NURSE PRACTITIONER

## 2024-02-14 RX ORDER — ALBUTEROL SULFATE 0.83 MG/ML
3 SOLUTION RESPIRATORY (INHALATION) AS NEEDED
Status: CANCELLED | OUTPATIENT
Start: 2024-02-21

## 2024-02-14 RX ORDER — FAMOTIDINE 10 MG/ML
20 INJECTION INTRAVENOUS ONCE AS NEEDED
Status: CANCELLED | OUTPATIENT
Start: 2024-02-21

## 2024-02-14 RX ORDER — EPINEPHRINE 0.3 MG/.3ML
0.3 INJECTION SUBCUTANEOUS EVERY 5 MIN PRN
Status: CANCELLED | OUTPATIENT
Start: 2024-02-21

## 2024-02-14 ASSESSMENT — ENCOUNTER SYMPTOMS
FATIGUE: 1
SHORTNESS OF BREATH: 1

## 2024-02-14 ASSESSMENT — PAIN SCALES - GENERAL: PAINLEVEL: 3

## 2024-02-14 NOTE — PROGRESS NOTES
[facility]   infusion Clinic Note   Date: 2024   Name: Lara Morris  : 1960   MRN: [id number]         Reason for Visit: OP Infusion (Feraheme)         Visit Type: INFUSION       Ordered By: Dr Wong      Accompanied by:Self      Diagnosis: Iron deficiency    Restless legs syndrome         Allergies:   Allergies as of 2024 - Reviewed 2024   Allergen Reaction Noted    Adhesive Hives 2023    Gamunex-c [immune globul g-gly-iga avg 46] Anaphylaxis 2023    Immune globulin Hives and Shortness of breath 10/31/2023    Immune globulin,gamma (igg) human Hives and Shortness of breath 10/31/2023    Animal dander Runny nose 2023    Atorvastatin Myalgia 2023    Codeine Nausea/vomiting 2023    House dust Runny nose 2023    Ropinirole Hives 2023    Tramadol Other 2024    Ciprofloxacin Hives and Rash 2023    Erythromycin Hives and Rash 2023         Current Medications has a current medication list which includes the following prescription(s): acetaminophen, amoxicillin-potassium clavulanate, azelastine, accu-chek francis plus test strp, cetirizine, cholecalciferol, cyanocobalamin, epinephrine, esomeprazole, ezetimibe, fluticasone, fluticasone-umeclidin-vilanter, gabapentin, gabapentin, ginseng, hizentra, ibuprofen, insulin aspart, ipratropium-albuterol, levothyroxine, lidocaine-prilocaine, nucala, metformin, montelukast, multivitamin, monoject hypodermic needles, omega 3-dha-epa-fish oil, onabotulinumtoxina, ondansetron, pen needle, diabetic, magnesium, potassium aspartate, resveratrol, rizatriptan, sulfamethoxazole-trimethoprim, monoject tuberculin syringe, mounjaro, and ventolin hfa, and the following Facility-Administered Medications: onabotulinumtoxina.       Vitals:   Vitals:    24 0703 24 0816   BP: 128/64 142/83   Pulse: 72 68   Resp: 16 16   Temp: 36.3 °C (97.4 °F)    TempSrc: Temporal    SpO2: 99%     Weight: 113 kg (249 lb)    PainSc:   3    PainLoc: Hand              Infusion Pre-procedure Checklist:   - Allergies reviewed: yes   - Medications reviewed: yes       - Previous reaction to current treatment: n/a      Assess patient for the concerns below. Document provider notification as appropriate.  - Active or recent infection with/without current antibiotic use: n/a  - Recent or planned invasive dental work: n/a  - Recent or planned surgeries: n/a  - Recently received or plans to receive vaccinations: n/a  - Has treatment related toxicities: n/a  - Is pregnant:  n/a      Pain: 0   - Is the pain different from normal: no   - Is the pain tolerable: n/a   - Is your Doctor aware:  n/a      Labs: N/A         Fall Risk Screening: Vidales Fall Risk  History of Falling, Immediate or Within 3 Months: No  Secondary Diagnosis: No  Ambulatory Aid: Walks without aid/bedrest/nurse assist  Intravenous Therapy/Heparin Lock: Yes  Gait/Transferring: Normal/bedrest/immobile  Mental Status: Oriented to own ability  Vidales Fall Risk Score: 20       Review Of Systems:  Review of Systems   Constitutional:  Positive for fatigue.   Respiratory:  Positive for shortness of breath.    Musculoskeletal:         Pt has diaphragm pacer         Infusion Readiness:   - Assessment Concerns Related to Infusion: No  - Provider notified: n/a      Document Below Only If Indicated:   New Patient Education:    NEW PATIENT MEDICATION EDUCATION PT PROVIDED WITH WRITTEN ([employer] PT EDUCATION SHEET) AND VERBAL EDUCATION REGARDING MEDICATION GIVEN. VERIFIED MEDICATION NAME WITH PATIENT AND DISCUSSED REASON FOR USE. BRIEFLY DISCUSSED HOW MEDICATION WORKS AND EDUCATED ON GOAL OF TREATMENT, FREQUENCY OF TREATMENT, ADVERSE RXN'S AND COMMON SIDE EFFECTS TO MONITOR FOR. INSTRUCTED PT TO ASSURE THAT ALL PROVIDERS INCLUDING DENTISTS ARE AWARE OF MEDICATION RECEIVED. DISCUSSED FLOW OF VISIT AND ORIENTED TO INFUSION CENTER. PT VERBALIZES UNDERSTANDING. CALL LIGHT  PROVIDED AND PT AWARE TO ALERT STAFF OF ANY CONCERNS DURING TREATMENT.        Treatment Conditions & Drug Specific Questions:    Feraheme        Weight Based Drug Calculations:    WEIGHT BASED DRUGS: NOT APPLICABLE / FLAT DOSE          Initiated By: Denise Hernandez RN   Time: 8:20 AM     We administered ferumoxytol (Feraheme) 510 mg in sodium chloride 0.9% 100 mL IV.   THERAPY PLAN COMPLETE

## 2024-02-14 NOTE — PATIENT INSTRUCTIONS
Today :We administered ferumoxytol (Feraheme) 510 mg in sodium chloride 0.9% 100 mL IV.     For:   1. Iron deficiency    2. Restless legs syndrome         Your next appointment is due in:  Therapy Plan Complete        Please read the  Medication Guide that was given to you and reviewed during todays visit.     (Tell all doctors including dentists that you are taking this medication)     Go to the emergency room or call 911 if:  -You have signs of allergic reaction:   -Rash, hives, itching.   -Swollen, blistered, peeling skin.   -Swelling of face, lips, mouth, tongue or throat.   -Tightness of chest, trouble breathing, swallowing or talking     Call your doctor:  - If IV / injection site gets red, warm, swollen, itchy or leaks fluid or pus.     (Leave dressing on your IV site for at least 2 hours and keep area clean and dry  - If you get sick or have symptoms of infection or are not feeling well for any reason.    (Wash your hands often, stay away from people who are sick)  - If you have side effects from your medication that do not go away or are bothersome.     (Refer to the teaching your nurse gave you for side effects to call your doctor about)    - Common side effects may include:  stuffy nose, headache, feeling tired, muscle aches, upset stomach  - Before receiving any vaccines     - Call the [facility] at   If:  - You get sick, are on antibiotics, have had a recent vaccine, have surgery or dental work and your doctor wants your visit rescheduled.  - You need to cancel and reschedule your visit for any reason. Call at least 2 days before your visit if you need to cancel.   - Your insurance changes before your next visit.    (We will need to get approval from your new insurance. This can take up to two weeks.)     The [facility] is opened Monday thru Friday. We are closed on weekends and holidays.   Voice mail will take your call if the center is closed. If you leave a message  please allow 24 hours for a call back during weekdays. If you leave a message on a weekend/holiday, we will call you back the next business day.

## 2024-02-15 PROCEDURE — RXMED WILLOW AMBULATORY MEDICATION CHARGE

## 2024-02-18 ENCOUNTER — SPECIALTY PHARMACY (OUTPATIENT)
Dept: PHARMACY | Facility: CLINIC | Age: 64
End: 2024-02-18

## 2024-02-19 DIAGNOSIS — E11.9 DIABETES MELLITUS TYPE 2 WITHOUT RETINOPATHY (MULTI): ICD-10-CM

## 2024-02-19 DIAGNOSIS — E66.01 CLASS 3 SEVERE OBESITY DUE TO EXCESS CALORIES WITH SERIOUS COMORBIDITY AND BODY MASS INDEX (BMI) OF 45.0 TO 49.9 IN ADULT (MULTI): ICD-10-CM

## 2024-02-19 PROCEDURE — RXMED WILLOW AMBULATORY MEDICATION CHARGE

## 2024-02-19 RX ORDER — TIRZEPATIDE 7.5 MG/.5ML
INJECTION, SOLUTION SUBCUTANEOUS
Qty: 2 ML | Refills: 2 | Status: SHIPPED | OUTPATIENT
Start: 2024-02-19 | End: 2024-03-20 | Stop reason: ALTCHOICE

## 2024-02-22 ENCOUNTER — PHARMACY VISIT (OUTPATIENT)
Dept: PHARMACY | Facility: CLINIC | Age: 64
End: 2024-02-22
Payer: COMMERCIAL

## 2024-02-23 ENCOUNTER — PHARMACY VISIT (OUTPATIENT)
Dept: PHARMACY | Facility: CLINIC | Age: 64
End: 2024-02-23
Payer: COMMERCIAL

## 2024-02-28 RX ORDER — MONTELUKAST SODIUM 10 MG/1
10 TABLET ORAL DAILY
Qty: 90 TABLET | Refills: 5 | Status: CANCELLED | OUTPATIENT
Start: 2024-02-28 | End: 2025-02-26

## 2024-03-04 ENCOUNTER — PROCEDURE VISIT (OUTPATIENT)
Dept: NEUROLOGY | Facility: CLINIC | Age: 64
End: 2024-03-04
Payer: COMMERCIAL

## 2024-03-04 VITALS — WEIGHT: 246 LBS | BODY MASS INDEX: 38.53 KG/M2

## 2024-03-04 DIAGNOSIS — G51.32 HEMIFACIAL SPASM OF LEFT SIDE OF FACE: Primary | ICD-10-CM

## 2024-03-04 PROCEDURE — 64612 DESTROY NERVE FACE MUSCLE: CPT | Performed by: PSYCHIATRY & NEUROLOGY

## 2024-03-04 NOTE — PROGRESS NOTES
Neurology Botulinum Injection    Subjective   Lara Morris is an 63 y.o. female here for medical botulinum injection for Hemifacial spasm of left side of face [G51.32]. 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 Partially worn off.        Interval PMH: diaphragmatic pacemaker placed     PMHx   CVID , taking IVIG for 12 years   Asthma  osteoarthritis   B12 deficiency   bilateral hearing impairment   wears CPAP      PSHx  Bilateral knee replacement    take alcohol   Objective   Neurological Exam  Physical Exam  ProceduresPrior to the start of the procedure a time out was taken and the following were verified: the identity of the patient using two patient identifiers (name, ) - this was verified by the patient      The area was prepped in the usual sterile fashion. Botulinum toxin type A (Botox)was injected as follows:     Dose Sites Muscle  5 units 1 left superomedial orbicularis ocularis   5 units 1 left superolateral orbicularis ocularis   5 units 1 left lateral orbicularis ocularis   5 units 1 left Inferior lateral orbicularis ocularis   5 units 1 left Inferior orbicularis ocularis   3 units 1 left zygomaticus   1 unit 1 left risorius  1 unit 1 left upper orbicularis oris    10 Unit        Lt platysma       Patient supplied Botox.  Total botulinum toxin used was 40 units, 60 discarded, patient supplied 100 units     diagnosis: hemifacial spasm

## 2024-03-06 PROCEDURE — RXMED WILLOW AMBULATORY MEDICATION CHARGE

## 2024-03-11 ENCOUNTER — HOME INFUSION (OUTPATIENT)
Dept: INFUSION THERAPY | Age: 64
End: 2024-03-11
Payer: COMMERCIAL

## 2024-03-11 ENCOUNTER — SPECIALTY PHARMACY (OUTPATIENT)
Dept: PHARMACY | Facility: CLINIC | Age: 64
End: 2024-03-11

## 2024-03-11 RX ORDER — CYANOCOBALAMIN 1000 UG/ML
INJECTION, SOLUTION INTRAMUSCULAR; SUBCUTANEOUS
Qty: 1 ML | Refills: 12 | Status: CANCELLED | OUTPATIENT
Start: 2024-03-11 | End: 2025-03-11

## 2024-03-11 NOTE — PROGRESS NOTES
Review of chart - pt with order for Hizentra 24gm weekly. Order is good for 6 fills total. Auth is good through 7/24/24. March FOTM verified. Pt has been set up with copay assistance program.     Pt contacted, will need doses for pickup on 3/13. Does not need EMLA cream.     Pharmacy to dispense the following 3/12 and for pt pickup 3/13:  8x Hizentra 10gm/50mL vials  4x Hizentra 4gm/20mL vials  For DOS 3/15-4/11     Pt to  from pharmacy - do not deliver to pt     FU 4/4 check next delivery date, check inventory and needs. Next dose around 4/12

## 2024-03-12 ENCOUNTER — PHARMACY VISIT (OUTPATIENT)
Dept: PHARMACY | Facility: CLINIC | Age: 64
End: 2024-03-12
Payer: COMMERCIAL

## 2024-03-12 ENCOUNTER — TELEPHONE (OUTPATIENT)
Dept: INFUSION THERAPY | Age: 64
End: 2024-03-12
Payer: COMMERCIAL

## 2024-03-12 NOTE — TELEPHONE ENCOUNTER
Patient returned call to confirm call she would like both 6mm and 9mm needle sets. Otherwise stnd supplies.    Patient will  at [facility] from warehouse or office staff tomorrow afternoon.

## 2024-03-15 PROCEDURE — RXMED WILLOW AMBULATORY MEDICATION CHARGE

## 2024-03-15 RX ORDER — ZOLPIDEM TARTRATE 10 MG/1
TABLET ORAL
COMMUNITY
Start: 2013-04-09 | End: 2024-03-20 | Stop reason: WASHOUT

## 2024-03-15 RX ORDER — MODAFINIL 200 MG/1
TABLET ORAL
COMMUNITY
Start: 2015-07-13 | End: 2024-03-20 | Stop reason: WASHOUT

## 2024-03-15 RX ORDER — ATORVASTATIN CALCIUM 20 MG/1
TABLET, FILM COATED ORAL
COMMUNITY
Start: 2020-05-22 | End: 2024-03-20 | Stop reason: WASHOUT

## 2024-03-15 RX ORDER — VALACYCLOVIR HYDROCHLORIDE 1 G/1
TABLET, FILM COATED ORAL
COMMUNITY
Start: 2018-03-29 | End: 2024-03-20 | Stop reason: WASHOUT

## 2024-03-15 RX ORDER — PANTOPRAZOLE SODIUM 40 MG/1
TABLET, DELAYED RELEASE ORAL
COMMUNITY
Start: 2018-06-11 | End: 2024-03-20 | Stop reason: WASHOUT

## 2024-03-15 RX ORDER — ZOLMITRIPTAN 5 MG/1
TABLET, FILM COATED ORAL
COMMUNITY
Start: 2017-09-19 | End: 2024-03-20 | Stop reason: WASHOUT

## 2024-03-15 RX ORDER — ROSUVASTATIN CALCIUM 5 MG/1
TABLET, COATED ORAL EVERY OTHER DAY
COMMUNITY
Start: 2020-06-25 | End: 2024-03-20 | Stop reason: WASHOUT

## 2024-03-15 RX ORDER — VENLAFAXINE 37.5 MG/1
TABLET ORAL
COMMUNITY
Start: 2019-08-12 | End: 2024-03-20 | Stop reason: WASHOUT

## 2024-03-15 RX ORDER — TRAZODONE HYDROCHLORIDE 100 MG/1
TABLET ORAL
COMMUNITY
Start: 2019-08-12 | End: 2024-05-30 | Stop reason: ALTCHOICE

## 2024-03-15 RX ORDER — INSULIN LISPRO 100 [IU]/ML
INJECTION, SOLUTION INTRAVENOUS; SUBCUTANEOUS
COMMUNITY
Start: 2020-05-13 | End: 2024-03-20 | Stop reason: WASHOUT

## 2024-03-18 ENCOUNTER — PHARMACY VISIT (OUTPATIENT)
Dept: PHARMACY | Facility: CLINIC | Age: 64
End: 2024-03-18
Payer: COMMERCIAL

## 2024-03-18 ENCOUNTER — OFFICE VISIT (OUTPATIENT)
Dept: RHEUMATOLOGY | Facility: CLINIC | Age: 64
End: 2024-03-18
Payer: COMMERCIAL

## 2024-03-18 VITALS
HEART RATE: 63 BPM | BODY MASS INDEX: 39.19 KG/M2 | TEMPERATURE: 97.2 F | SYSTOLIC BLOOD PRESSURE: 137 MMHG | WEIGHT: 250.2 LBS | DIASTOLIC BLOOD PRESSURE: 71 MMHG

## 2024-03-18 DIAGNOSIS — M19.90 ARTHRITIS: Primary | ICD-10-CM

## 2024-03-18 PROCEDURE — 99213 OFFICE O/P EST LOW 20 MIN: CPT | Performed by: INTERNAL MEDICINE

## 2024-03-18 PROCEDURE — 1159F MED LIST DOCD IN RCRD: CPT | Performed by: INTERNAL MEDICINE

## 2024-03-18 PROCEDURE — 3075F SYST BP GE 130 - 139MM HG: CPT | Performed by: INTERNAL MEDICINE

## 2024-03-18 PROCEDURE — RXMED WILLOW AMBULATORY MEDICATION CHARGE

## 2024-03-18 PROCEDURE — 3078F DIAST BP <80 MM HG: CPT | Performed by: INTERNAL MEDICINE

## 2024-03-18 PROCEDURE — 1125F AMNT PAIN NOTED PAIN PRSNT: CPT | Performed by: INTERNAL MEDICINE

## 2024-03-18 PROCEDURE — 1036F TOBACCO NON-USER: CPT | Performed by: INTERNAL MEDICINE

## 2024-03-18 PROCEDURE — 3008F BODY MASS INDEX DOCD: CPT | Performed by: INTERNAL MEDICINE

## 2024-03-18 RX ORDER — IBUPROFEN 800 MG/1
800 TABLET ORAL 2 TIMES DAILY PRN
Qty: 180 TABLET | Refills: 1 | Status: SHIPPED | OUTPATIENT
Start: 2024-03-18 | End: 2025-03-18

## 2024-03-18 RX ORDER — IBUPROFEN 200 MG
1 CAPSULE ORAL DAILY
COMMUNITY

## 2024-03-18 RX ORDER — TURMERIC 400 MG
CAPSULE ORAL
COMMUNITY

## 2024-03-18 RX ORDER — GLUCOSAMINE/CHONDRO SU A 500-400 MG
1 TABLET ORAL 3 TIMES DAILY
COMMUNITY

## 2024-03-18 ASSESSMENT — PAIN SCALES - GENERAL: PAINLEVEL: 4

## 2024-03-18 NOTE — PROGRESS NOTES
She complains of painful triggering of the third digit of the right hand and has noted some similar but milder triggering of the fourth digit of the left hand.  She has been attending occupational therapy and doing stretching exercises.  She was splint on the third digit of the right hand to limit the triggering.  She has tightness in her shoulders and neck.  She continues to take IVIG infusions.  She gets Botox injections for twitching of the left eye.    Examination shows tightness and tenderness in the supraspinatus muscles bilaterally.  There is good active range of motion of the cervical spine.  There is good passive range of motion of the shoulders.  There are Heberden's nodes and some of the digits of both hands.  There is a fluctuant subcutaneous nodule in the distribution of the proximal aspect of the flexor tendon of the third digit of the right hand.  There is a smaller nodule in the distribution of the flexor tendon of the fourth digit of the left hand at the level of the palm.    Laboratory (12/13/2023)  WBC 10.4, hemoglobin 11.8, hematocrit 37.7, MCV 84, MCHC 31.3, platelets 290, iron 44, TIBC 472, saturation 9%, (12/8/2023) CRP 0.33, ESR 17, BUN 23, creatinine 1.03, glucose 74, AST 12, ALT 13, alk phos 370.  Chest x-ray (10/27/2023) elevation of the right hemidiaphragm.    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  She has history of anaphylaxis with a brand of IVIG.    She was given a local injection of triamcinolone 10 mg with 0.5 mL of 1% lidocaine to the flexor tendon of the third digit on the right hand.  She was given a local injection of triamcinolone 20 mg with 1 mg and lidocaine to the region of tenderness in the  supraspinatus muscles bilaterally.  She is to continue with her current medications.  She is to return at the next available office appointment.

## 2024-03-19 PROCEDURE — RXMED WILLOW AMBULATORY MEDICATION CHARGE

## 2024-03-20 ENCOUNTER — OFFICE VISIT (OUTPATIENT)
Dept: PRIMARY CARE | Facility: CLINIC | Age: 64
End: 2024-03-20
Payer: COMMERCIAL

## 2024-03-20 ENCOUNTER — PHARMACY VISIT (OUTPATIENT)
Dept: PHARMACY | Facility: CLINIC | Age: 64
End: 2024-03-20
Payer: COMMERCIAL

## 2024-03-20 VITALS
TEMPERATURE: 97.3 F | HEIGHT: 67 IN | OXYGEN SATURATION: 97 % | SYSTOLIC BLOOD PRESSURE: 133 MMHG | BODY MASS INDEX: 38.14 KG/M2 | WEIGHT: 243 LBS | HEART RATE: 71 BPM | DIASTOLIC BLOOD PRESSURE: 73 MMHG

## 2024-03-20 DIAGNOSIS — E03.8 HYPOTHYROIDISM DUE TO HASHIMOTO'S THYROIDITIS: ICD-10-CM

## 2024-03-20 DIAGNOSIS — Z12.31 ENCOUNTER FOR SCREENING MAMMOGRAM FOR BREAST CANCER: ICD-10-CM

## 2024-03-20 DIAGNOSIS — E78.2 COMBINED HYPERLIPIDEMIA: ICD-10-CM

## 2024-03-20 DIAGNOSIS — E06.3 HYPOTHYROIDISM DUE TO HASHIMOTO'S THYROIDITIS: ICD-10-CM

## 2024-03-20 DIAGNOSIS — J47.9 BRONCHIECTASIS, UNCOMPLICATED (MULTI): ICD-10-CM

## 2024-03-20 DIAGNOSIS — D83.9 COMMON VARIABLE IMMUNODEFICIENCY (MULTI): ICD-10-CM

## 2024-03-20 DIAGNOSIS — E66.01 CLASS 2 SEVERE OBESITY DUE TO EXCESS CALORIES WITH SERIOUS COMORBIDITY AND BODY MASS INDEX (BMI) OF 38.0 TO 38.9 IN ADULT (MULTI): ICD-10-CM

## 2024-03-20 DIAGNOSIS — Z12.12 SCREENING FOR COLORECTAL CANCER: ICD-10-CM

## 2024-03-20 DIAGNOSIS — Z00.00 ENCOUNTER FOR WELLNESS EXAMINATION: Primary | ICD-10-CM

## 2024-03-20 DIAGNOSIS — Z78.9 STATIN INTOLERANCE: ICD-10-CM

## 2024-03-20 DIAGNOSIS — Z12.11 SCREENING FOR COLORECTAL CANCER: ICD-10-CM

## 2024-03-20 DIAGNOSIS — E11.9 DIABETES MELLITUS TYPE 2 WITHOUT RETINOPATHY (MULTI): ICD-10-CM

## 2024-03-20 DIAGNOSIS — J06.9 UPPER RESPIRATORY TRACT INFECTION, UNSPECIFIED TYPE: ICD-10-CM

## 2024-03-20 PROBLEM — R01.1 HEART MURMUR: Status: RESOLVED | Noted: 2023-03-07 | Resolved: 2024-03-20

## 2024-03-20 PROBLEM — T50.905A HYPERGLYCEMIA, DRUG-INDUCED: Status: RESOLVED | Noted: 2023-03-07 | Resolved: 2024-03-20

## 2024-03-20 PROBLEM — E66.812 CLASS 2 SEVERE OBESITY DUE TO EXCESS CALORIES WITH SERIOUS COMORBIDITY AND BODY MASS INDEX (BMI) OF 38.0 TO 38.9 IN ADULT: Status: ACTIVE | Noted: 2023-05-23

## 2024-03-20 PROBLEM — R42 DIZZINESS: Status: RESOLVED | Noted: 2023-03-07 | Resolved: 2024-03-20

## 2024-03-20 PROBLEM — Z79.52 CURRENT CHRONIC USE OF SYSTEMIC STEROIDS: Status: RESOLVED | Noted: 2023-03-07 | Resolved: 2024-03-20

## 2024-03-20 PROBLEM — R73.9 HYPERGLYCEMIA, DRUG-INDUCED: Status: RESOLVED | Noted: 2023-03-07 | Resolved: 2024-03-20

## 2024-03-20 PROBLEM — D23.30 BENIGN NEOPLASM OF SKIN OF FACE: Status: RESOLVED | Noted: 2023-03-07 | Resolved: 2024-03-20

## 2024-03-20 PROBLEM — D50.9 IRON DEFICIENCY ANEMIA: Status: RESOLVED | Noted: 2024-02-09 | Resolved: 2024-03-20

## 2024-03-20 PROCEDURE — 3008F BODY MASS INDEX DOCD: CPT | Performed by: INTERNAL MEDICINE

## 2024-03-20 PROCEDURE — 3075F SYST BP GE 130 - 139MM HG: CPT | Performed by: INTERNAL MEDICINE

## 2024-03-20 PROCEDURE — 1036F TOBACCO NON-USER: CPT | Performed by: INTERNAL MEDICINE

## 2024-03-20 PROCEDURE — 1159F MED LIST DOCD IN RCRD: CPT | Performed by: INTERNAL MEDICINE

## 2024-03-20 PROCEDURE — 99397 PER PM REEVAL EST PAT 65+ YR: CPT | Performed by: INTERNAL MEDICINE

## 2024-03-20 PROCEDURE — 87632 RESP VIRUS 6-11 TARGETS: CPT

## 2024-03-20 PROCEDURE — 87637 SARSCOV2&INF A&B&RSV AMP PRB: CPT

## 2024-03-20 PROCEDURE — 3078F DIAST BP <80 MM HG: CPT | Performed by: INTERNAL MEDICINE

## 2024-03-20 PROCEDURE — 99215 OFFICE O/P EST HI 40 MIN: CPT | Performed by: INTERNAL MEDICINE

## 2024-03-20 PROCEDURE — RXMED WILLOW AMBULATORY MEDICATION CHARGE

## 2024-03-20 RX ORDER — AMOXICILLIN AND CLAVULANATE POTASSIUM 562.5; 437.5; 62.5 MG/1; MG/1; MG/1
2 TABLET, FILM COATED, EXTENDED RELEASE ORAL EVERY 12 HOURS
Qty: 56 TABLET | Refills: 2 | Status: SHIPPED | OUTPATIENT
Start: 2024-03-20 | End: 2024-05-30 | Stop reason: ALTCHOICE

## 2024-03-20 RX ORDER — METFORMIN HYDROCHLORIDE 1000 MG/1
1000 TABLET ORAL
Qty: 30 TABLET | Refills: 11 | Status: SHIPPED | OUTPATIENT
Start: 2024-03-20 | End: 2024-05-30 | Stop reason: ALTCHOICE

## 2024-03-20 RX ORDER — TIRZEPATIDE 10 MG/.5ML
10 INJECTION, SOLUTION SUBCUTANEOUS
Qty: 2 ML | Refills: 11 | Status: SHIPPED | OUTPATIENT
Start: 2024-03-20

## 2024-03-20 NOTE — PROGRESS NOTES
"Subjective   Lara Morris is a 63 y.o. female who presents for Annual Exam.  HPI    Past medical history includes CVID, obesity, hypothyroidism, asthma, hypertension, osteoarthritis and diabetes.    Interim:  - continues weekly IVIG infusions and Botox injections    Not feeling well the last 2 weeks.  Mostly tired and more recently with low-grade fevers.  Negative home COVID testing.  Does have some increased nasal congestion and cough is more productive.  Some nausea, no vomiting or diarrhea.  Also joint pains so taking glucosamine and tumeric.    Initial A1c > 9%, started on Metformin and CSI (for steroid coverage).  Has not required any insulin in some time now.  On Mounjaro since May 2023, no side effects.  Has really helped with controlling hunger.  Able to limit carbs more easily.    Did get iron infusions, felt more energy and resolved her RLS.            Providers:  Immunology-Dr. Emilie Richey  Surgery - Dr. Torres  Rheumatology-Dr. Estrada  GI- Dr. Tiffany Love (no longer with )   Pulmonary: Dr. Gorge Wilkinson  Ophthalmology: Dr. Hanson  Neurology: Dr. Richie Tristan  Sleep: Dr. Wong    Objective   /73   Pulse 71   Temp 36.3 °C (97.3 °F)   Ht 1.702 m (5' 7\")   Wt 110 kg (243 lb)   SpO2 97%   BMI 38.06 kg/m²    Physical Exam  Gen: NAD, pleasant, A&;Ox3  HEENT: PERRL, EOMI, MMM, OP clear, TM clear, nasal congestion  Neck: supple, no thyromegaly, no JVD, normal carotid upstroke  Pulm: lungs CTAB, mild basilar inspiratory wheezes especially at RLL  CV: RRR, early systolic murmur, midsternal, prominent P2, 2+ DP pulses  Abd: NABS, soft, NT, ND no HSM  Ext: no peripheral edema  Neuro: CN II-XII intact, no focal sensory or motor deficits, normal reflexes     Assessment/Plan   URI with immunodeficiency: Empiric antibiotic therapy, send viral swab, if positive can discontinue antibiotics, follow-up with Dr. RICK    Metabolic syndrome: NIDDM/HTN/HLD/Obesity, A1c 5.5%  - Continue metformin 1000 " mg daily; repeat A1c, if remains well-controlled, can discontinue completely and remain on GLP-1 RA monotherapy  -Continue Mounjaro 7.5mg weekly (initial weight ~300lb (5/2023) --> 243lb (3/20/2024), increase to 10 mg with next dose  -Sliding-scale insulin for glucose control with use of steroids if needed, has been sometime since there was any concern  -Statin not tolerated even at very low dose (rosuva 5mg QOD); continue Zetia, recheck with next labs  -Blood pressure is controlled off lisinopril, even small dose causing symptoms of hypotension  -Recommend annual podiatry and eye exam    Hypothyroidism:   -Continues on levothyroxine, 150mcg daily  - normal TSH 5/23/2023    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 Torres/team  - CTPE negative  - PFTs normal May 2023  - mild aortic stenosis and diastolic dysfunction, June 2023; repeat TTE in one year       CVID and asthma: managed by Dr. Emilie Richey, on Hizentra    JOSÉ MIGUEL/RLS: Moderate with hypoxia on HSAT 11/1/2023  - on BiPAP, 8.5/4, tolerating better than CPAP, using every night and benefiting with energy and sleep quality, continue;   - iron supplementation and gabapentin 600mg at bedtime with improvement  - follow-up with sleep med, Dr. Wong         Health maintenance  -Mammogram: 12/19/2022, normal  -Pap: History of hysterectomy, unclear if indicated  -DEXA: If not done should check baseline given steroid use  -Last colonoscopy: 04/22/2019 , tubular adenoma Repeat: 3 years (poor prep) ordered  -Smoking history: Never  -Counseled regarding diet and exercise  -Immunizations: Per Dr. Emilie Richey  -Followup in 3-6 months, sooner if needed         Chan Hernandez MD

## 2024-03-21 ENCOUNTER — TELEPHONE (OUTPATIENT)
Dept: CARDIOLOGY | Facility: HOSPITAL | Age: 64
End: 2024-03-21
Payer: COMMERCIAL

## 2024-03-21 DIAGNOSIS — I35.0 NONRHEUMATIC AORTIC VALVE STENOSIS: Primary | ICD-10-CM

## 2024-03-21 LAB
FLUAV RNA RESP QL NAA+PROBE: NOT DETECTED
FLUBV RNA RESP QL NAA+PROBE: NOT DETECTED
RSV RNA RESP QL NAA+PROBE: NOT DETECTED
SARS-COV-2 RNA RESP QL NAA+PROBE: NOT DETECTED

## 2024-03-21 RX ORDER — MONTELUKAST SODIUM 10 MG/1
10 TABLET ORAL DAILY
Qty: 90 TABLET | Refills: 5 | Status: CANCELLED | OUTPATIENT
Start: 2024-03-21 | End: 2025-03-20

## 2024-03-22 ENCOUNTER — PHARMACY VISIT (OUTPATIENT)
Dept: PHARMACY | Facility: CLINIC | Age: 64
End: 2024-03-22
Payer: COMMERCIAL

## 2024-03-22 LAB
ADENOVIRUS RVP, VIRC: NOT DETECTED
ENTEROVIRUS/RHINOVIRUS RVP, VIRC: NOT DETECTED
HUMAN BOCAVIRUS RVP, VIRC: NOT DETECTED
HUMAN CORONAVIRUS RVP, VIRC: NOT DETECTED
INFLUENZA A , VIRC: NOT DETECTED
INFLUENZA A H1N1-09 , VIRC: NOT DETECTED
INFLUENZA B PCR, VIRC: NOT DETECTED
METAPNEUMOVIRUS , VIRC: NOT DETECTED
PARAINFLUENZA PCR, VIRC: NOT DETECTED
RSV PCR, RVP, VIRC: NOT DETECTED

## 2024-03-22 PROCEDURE — RXMED WILLOW AMBULATORY MEDICATION CHARGE

## 2024-03-22 RX ORDER — NEEDLES, DISPOSABLE 25GX5/8"
NEEDLE, DISPOSABLE MISCELLANEOUS
Qty: 2 EACH | Refills: 11 | OUTPATIENT
Start: 2024-03-22 | End: 2024-05-30 | Stop reason: ALTCHOICE

## 2024-03-22 RX ORDER — SYRINGE, DISPOSABLE, 1 ML
SYRINGE, EMPTY DISPOSABLE MISCELLANEOUS
Qty: 2 EACH | Refills: 11 | OUTPATIENT
Start: 2024-03-22 | End: 2024-05-30 | Stop reason: ALTCHOICE

## 2024-03-22 RX ORDER — CYANOCOBALAMIN 1000 UG/ML
INJECTION, SOLUTION INTRAMUSCULAR; SUBCUTANEOUS
Qty: 3 ML | Refills: 4 | OUTPATIENT
Start: 2024-03-22

## 2024-03-22 RX ORDER — MONTELUKAST SODIUM 10 MG/1
10 TABLET ORAL DAILY
Qty: 90 TABLET | Refills: 3 | OUTPATIENT
Start: 2024-03-22

## 2024-03-25 ENCOUNTER — PHARMACY VISIT (OUTPATIENT)
Dept: PHARMACY | Facility: CLINIC | Age: 64
End: 2024-03-25
Payer: COMMERCIAL

## 2024-03-28 ENCOUNTER — HOSPITAL ENCOUNTER (OUTPATIENT)
Dept: RADIOLOGY | Facility: CLINIC | Age: 64
Discharge: HOME | End: 2024-03-28
Payer: COMMERCIAL

## 2024-03-28 VITALS — HEIGHT: 67 IN | WEIGHT: 242.51 LBS | BODY MASS INDEX: 38.06 KG/M2

## 2024-03-28 DIAGNOSIS — Z12.31 ENCOUNTER FOR SCREENING MAMMOGRAM FOR BREAST CANCER: ICD-10-CM

## 2024-03-28 PROCEDURE — 77067 SCR MAMMO BI INCL CAD: CPT | Performed by: RADIOLOGY

## 2024-03-28 PROCEDURE — 77067 SCR MAMMO BI INCL CAD: CPT

## 2024-03-28 PROCEDURE — 77063 BREAST TOMOSYNTHESIS BI: CPT | Performed by: RADIOLOGY

## 2024-04-02 DIAGNOSIS — J98.6 DIAPHRAGM DYSFUNCTION: Primary | ICD-10-CM

## 2024-04-02 PROCEDURE — RXMED WILLOW AMBULATORY MEDICATION CHARGE

## 2024-04-03 ENCOUNTER — PHARMACY VISIT (OUTPATIENT)
Dept: PHARMACY | Facility: CLINIC | Age: 64
End: 2024-04-03
Payer: COMMERCIAL

## 2024-04-06 PROCEDURE — RXMED WILLOW AMBULATORY MEDICATION CHARGE

## 2024-04-06 RX ORDER — MONTELUKAST SODIUM 10 MG/1
10 TABLET ORAL DAILY
Qty: 90 TABLET | Refills: 5 | Status: CANCELLED | OUTPATIENT
Start: 2024-04-06 | End: 2025-04-05

## 2024-04-08 ENCOUNTER — HOME INFUSION (OUTPATIENT)
Dept: INFUSION THERAPY | Age: 64
End: 2024-04-08
Payer: COMMERCIAL

## 2024-04-08 NOTE — PROGRESS NOTES
Review of chart - pt with order for Hizentra 24gm weekly. Order is good for 6 fills total. Auth is good through 7/24/24. April FOTM verified. Pt has been set up with copay assistance program.     Pt contacted, will need doses for pickup on 4/10. Does need EMLA cream.     Pharmacy to dispense the following 4/9 and for pt pickup 4/10:  8x Hizentra 10gm/50mL vials  4x Hizentra 4gm/20mL vials  For DOS: 4/12 - 5/9    Pt to  from pharmacy - do not deliver to pt     FU 5/3 check next delivery date, check inventory and needs, check FOTM. Next dose around 5/10

## 2024-04-09 ENCOUNTER — PHARMACY VISIT (OUTPATIENT)
Dept: PHARMACY | Facility: CLINIC | Age: 64
End: 2024-04-09
Payer: COMMERCIAL

## 2024-04-09 PROCEDURE — RXMED WILLOW AMBULATORY MEDICATION CHARGE

## 2024-04-10 ENCOUNTER — LAB (OUTPATIENT)
Dept: LAB | Facility: LAB | Age: 64
End: 2024-04-10
Payer: COMMERCIAL

## 2024-04-10 ENCOUNTER — PHARMACY VISIT (OUTPATIENT)
Dept: PHARMACY | Facility: CLINIC | Age: 64
End: 2024-04-10
Payer: COMMERCIAL

## 2024-04-10 DIAGNOSIS — D83.0 COMMON VARIABLE IMMUNODEFICIENCY WITH PREDOMINANT ABNORMALITIES OF B-CELL NUMBERS AND FUNCTION (MULTI): Primary | ICD-10-CM

## 2024-04-10 DIAGNOSIS — E66.01 CLASS 2 SEVERE OBESITY DUE TO EXCESS CALORIES WITH SERIOUS COMORBIDITY AND BODY MASS INDEX (BMI) OF 38.0 TO 38.9 IN ADULT (MULTI): ICD-10-CM

## 2024-04-10 DIAGNOSIS — E03.8 HYPOTHYROIDISM DUE TO HASHIMOTO'S THYROIDITIS: ICD-10-CM

## 2024-04-10 DIAGNOSIS — E78.2 COMBINED HYPERLIPIDEMIA: ICD-10-CM

## 2024-04-10 DIAGNOSIS — E06.3 HYPOTHYROIDISM DUE TO HASHIMOTO'S THYROIDITIS: ICD-10-CM

## 2024-04-10 LAB
ALBUMIN SERPL BCP-MCNC: 4.1 G/DL (ref 3.4–5)
ALP SERPL-CCNC: 67 U/L (ref 33–136)
ALT SERPL W P-5'-P-CCNC: 18 U/L (ref 7–45)
ANION GAP SERPL CALC-SCNC: 12 MMOL/L (ref 10–20)
AST SERPL W P-5'-P-CCNC: 15 U/L (ref 9–39)
BASOPHILS # BLD AUTO: 0.05 X10*3/UL (ref 0–0.1)
BASOPHILS NFR BLD AUTO: 0.5 %
BILIRUB SERPL-MCNC: 0.5 MG/DL (ref 0–1.2)
BUN SERPL-MCNC: 18 MG/DL (ref 6–23)
CALCIUM SERPL-MCNC: 8.4 MG/DL (ref 8.6–10.3)
CHLORIDE SERPL-SCNC: 100 MMOL/L (ref 98–107)
CHOLEST SERPL-MCNC: 180 MG/DL (ref 0–199)
CHOLESTEROL/HDL RATIO: 3.2
CO2 SERPL-SCNC: 27 MMOL/L (ref 21–32)
CREAT SERPL-MCNC: 0.78 MG/DL (ref 0.5–1.05)
EGFRCR SERPLBLD CKD-EPI 2021: 84 ML/MIN/1.73M*2
EOSINOPHIL # BLD AUTO: 0.06 X10*3/UL (ref 0–0.7)
EOSINOPHIL NFR BLD AUTO: 0.6 %
ERYTHROCYTE [DISTWIDTH] IN BLOOD BY AUTOMATED COUNT: 15.9 % (ref 11.5–14.5)
ERYTHROCYTE [SEDIMENTATION RATE] IN BLOOD BY WESTERGREN METHOD: 14 MM/H (ref 0–30)
EST. AVERAGE GLUCOSE BLD GHB EST-MCNC: 105 MG/DL
GLUCOSE SERPL-MCNC: 72 MG/DL (ref 74–99)
HBA1C MFR BLD: 5.3 %
HCT VFR BLD AUTO: 39.9 % (ref 36–46)
HDLC SERPL-MCNC: 55.7 MG/DL
HGB BLD-MCNC: 13.1 G/DL (ref 12–16)
IMM GRANULOCYTES # BLD AUTO: 0.02 X10*3/UL (ref 0–0.7)
IMM GRANULOCYTES NFR BLD AUTO: 0.2 % (ref 0–0.9)
LDLC SERPL CALC-MCNC: 109 MG/DL
LYMPHOCYTES # BLD AUTO: 2.92 X10*3/UL (ref 1.2–4.8)
LYMPHOCYTES NFR BLD AUTO: 29.1 %
MCH RBC QN AUTO: 28.8 PG (ref 26–34)
MCHC RBC AUTO-ENTMCNC: 32.8 G/DL (ref 32–36)
MCV RBC AUTO: 88 FL (ref 80–100)
MONOCYTES # BLD AUTO: 0.55 X10*3/UL (ref 0.1–1)
MONOCYTES NFR BLD AUTO: 5.5 %
NEUTROPHILS # BLD AUTO: 6.45 X10*3/UL (ref 1.2–7.7)
NEUTROPHILS NFR BLD AUTO: 64.1 %
NON HDL CHOLESTEROL: 124 MG/DL (ref 0–149)
NRBC BLD-RTO: 0 /100 WBCS (ref 0–0)
PLATELET # BLD AUTO: 240 X10*3/UL (ref 150–450)
POTASSIUM SERPL-SCNC: 4.2 MMOL/L (ref 3.5–5.3)
PROT SERPL-MCNC: 6.9 G/DL (ref 6.4–8.2)
RBC # BLD AUTO: 4.55 X10*6/UL (ref 4–5.2)
SODIUM SERPL-SCNC: 135 MMOL/L (ref 136–145)
TRIGL SERPL-MCNC: 77 MG/DL (ref 0–149)
TSH SERPL-ACNC: 0.81 MIU/L (ref 0.44–3.98)
VLDL: 15 MG/DL (ref 0–40)
WBC # BLD AUTO: 10.1 X10*3/UL (ref 4.4–11.3)

## 2024-04-10 PROCEDURE — 85025 COMPLETE CBC W/AUTO DIFF WBC: CPT

## 2024-04-10 PROCEDURE — 82784 ASSAY IGA/IGD/IGG/IGM EACH: CPT

## 2024-04-10 PROCEDURE — 80061 LIPID PANEL: CPT

## 2024-04-10 PROCEDURE — 36415 COLL VENOUS BLD VENIPUNCTURE: CPT

## 2024-04-10 PROCEDURE — 85652 RBC SED RATE AUTOMATED: CPT

## 2024-04-10 PROCEDURE — 80053 COMPREHEN METABOLIC PANEL: CPT

## 2024-04-10 PROCEDURE — 84443 ASSAY THYROID STIM HORMONE: CPT

## 2024-04-10 PROCEDURE — 83036 HEMOGLOBIN GLYCOSYLATED A1C: CPT

## 2024-04-11 LAB
IGA SERPL-MCNC: 165 MG/DL (ref 70–400)
IGG SERPL-MCNC: 1120 MG/DL (ref 700–1600)
IGM SERPL-MCNC: 163 MG/DL (ref 40–230)

## 2024-04-17 ENCOUNTER — HOME INFUSION (OUTPATIENT)
Dept: INFUSION THERAPY | Age: 64
End: 2024-04-17
Payer: COMMERCIAL

## 2024-04-17 ENCOUNTER — PHARMACY VISIT (OUTPATIENT)
Dept: PHARMACY | Facility: CLINIC | Age: 64
End: 2024-04-17
Payer: COMMERCIAL

## 2024-04-17 DIAGNOSIS — K21.9 GASTROESOPHAGEAL REFLUX DISEASE WITHOUT ESOPHAGITIS: ICD-10-CM

## 2024-04-17 PROCEDURE — RXMED WILLOW AMBULATORY MEDICATION CHARGE

## 2024-04-17 RX ORDER — OMEPRAZOLE 40 MG/1
40 CAPSULE, DELAYED RELEASE ORAL
Qty: 90 CAPSULE | Refills: 3 | Status: SHIPPED | OUTPATIENT
Start: 2024-04-17 | End: 2025-04-17

## 2024-04-17 RX ORDER — ESOMEPRAZOLE MAGNESIUM 40 MG/1
40 CAPSULE, DELAYED RELEASE ORAL
Qty: 90 CAPSULE | Refills: 3 | Status: CANCELLED | OUTPATIENT
Start: 2024-04-17 | End: 2025-04-17

## 2024-04-17 NOTE — PROGRESS NOTES
Call from [facility]ja at Dr. Richey's office, please switch to higher flow rate tubing for next delivery to help run infusion faster. Currently receiving F900 tubing, can send  with next delivery.

## 2024-04-18 ENCOUNTER — PHARMACY VISIT (OUTPATIENT)
Dept: PHARMACY | Facility: CLINIC | Age: 64
End: 2024-04-18

## 2024-04-18 PROCEDURE — RXMED WILLOW AMBULATORY MEDICATION CHARGE

## 2024-04-19 ENCOUNTER — PHARMACY VISIT (OUTPATIENT)
Dept: PHARMACY | Facility: CLINIC | Age: 64
End: 2024-04-19
Payer: COMMERCIAL

## 2024-04-19 ENCOUNTER — DOCUMENTATION (OUTPATIENT)
Dept: INFUSION THERAPY | Age: 64
End: 2024-04-19
Payer: COMMERCIAL

## 2024-04-19 NOTE — PROGRESS NOTES
Patient to  3 sets of  tubing for subcutaneous infusion of next 3 Hizentra doses today by noon at pharmacy location.  tubing to be sent going forward as well.

## 2024-04-23 DIAGNOSIS — M54.50 BILATERAL LOW BACK PAIN WITHOUT SCIATICA, UNSPECIFIED CHRONICITY: ICD-10-CM

## 2024-04-23 NOTE — PROGRESS NOTES
Patient: Lara Morris    [id number]  : 1960 -- AGE 64 y.o.    Provider: Melani Wong MD PhD     Location [facility]   Service Date: 2024              [facility]  Followup Visit Note      HISTORY OF PRESENT ILLNESS     The patient's referring provider is: Chan Hernandez MD     HISTORY OF PRESENT ILLNESS   Lara Morris is a 64 y.o. female with past medical history significant for JOSÉ MIGUEL on CPAP, CVID, aortic stenosis, T2DM, asthma, right hemidiaphragm paralysis s/p b/l pacer on 23, CFS, GERD, hypothyroid who presents to a [facility] for followup.     PAST SLEEP HISTORY  Patient seen by me on 23. Patient reported that she has known JOSÉ MIGUEL for 8 years and has been on CPAP. She was referred to establish care for her JOSÉ MIGUEL. She has benefitted from CPAP with nasal pillows on CPAP at 11cwp. She thinks she had severe JOSÉ MIGUEL. On CPAP she does not snore, gasp, choke. Without CPAP she will have her leg jerks. She sleeps at about 30 degrees. Her DME was [facility]. She gets supplies now from [employer] she thinks.     She also reported RLS symptoms. She described them as a sense that she has to move her legs. If her legs are still there will spontaneously move on their own. mostly at night but now in the evenings. She massages her legs to help. If she walks they are removed she was on roprinole but had hives. Bother her every night and makes falling asleep difficult. She is currently on Neurontin at 300 mg for back pain that she takes at night just before going to bed. Symptoms start at 7:30-8pm.      Patient may have been exposed to RSV when her grandchildren were affected. She became sick but did not recover her breathing fully. Patient with right hemidiaphragm paralysis with SOB since 2023 (elevation present since at least ). Patient with pacer placed by Dr. Torres on 23.  With the diaphragm stimulator she has noted that she is  using it all the time. When she stops it or when she goes for her shower she does notice that she is more short of breath at that time. She will have to continue using this for the next 2 years is what she was informed.    A home sleep apnea test was done on 11/1/2023 at a BMI of 38.8.  This showed an FRANCESCA 3% of 17 events/hour and FRANCESCA 4% of 12 events/hour and a central apnea index of 0.1 events/hour.  There was no significant effect of body position.  She then had an in center PAP titration done on 11/21/2023 CPAP at 6 and 7 appeared to be effective as well as BPAP at 8/4.    Had referred her for iron infusions (Ferraheme) in January of 2024 given ferritin of 63 and iron saturation of 9%. She had 2 sessions. Per PCP notes felt more energy and had improvements in her RLS.    CURRENT HISTORY  At her last visit she was using her diaphragm stimulator but not having improvement in EMG function. She was using BPAP at 8/4 abd had increased it to 8.5/4. She has a Jazlyn 3G device with a nasal mask. She sleeps at 30 degrees elevation.  For her RLS we had increased her evening gabapentin to 600mg and to take at 6-7pm. We also referred her for an iron infusion.    She had 2 sessions for iron infusion in Feb 2024.     On today's visit she reports, that her RLS significantly improved after the iron infusion in February.  It improved to the point that she previously was able to stop the gabapentin after 3 to 4 weeks.  She has had some return of the leg symptoms occurring as early as 7 PM.  She is taking the gabapentin again which is helpful at 600 mg.  Sometimes she will wake up in the middle of the night with a sense of her legs moving.  On a scale of 1-10, with 10 being the worst the RLS was just before the iron infusions she is down to about a 2.  She is taking an iron supplement daily that is called blood builder.  It has vitamin C included.    With respect to her sleep apnea she is using a Jazlyn BPAP device.  On BPAP she does  not snore, gasp, nor choke.  She finds this to be more comfortable than her CPAP device.  She feels more rested with using it.    From the standpoint of her right hemidiaphragm paralysis, she continues to use her diaphragm stimulator.  When it is on it helps with her exercise tolerance.  When it turns off, however, she does have increased shortness of breath.  She does note some improvement in her pulmonary function when she goes to her allergist.    Sleep schedule:    In bed: 10-11p  Activities in bed:  Bedtime Activities: turns out the lights  Subjective sleep latency:  up to 60 minutes  Awakenings during night: 1-2  Length of awakenings: 30-60 min  Final awakening time: 6AM  Out of bed: 6A  Overall estimate of total sleep time: 5-6    Weekends: wakes at 7AM  Preferred sleeping position: supine and sidelying  Typically sleeps with her .       PAP Adherence:  DURABLE MEDICAL EQUIPMENT COMPANY: "[employer]"  Machine: THERAPY: BILEVEL PAP PRESSURE SETTINGS: 8.5 cm H2O / 4 cm H2O  Mask: MASK TYPE: NASAL MASK   Issues with therapy: ISSUES WITH THERAPY: none  Benefits with PAP: PERCEIVED BENEFITS OF PAP: better sleep quality      Daytime Symptoms  On awakening patient reports: more refreshed than in the past.    Daytime: During the day, she does not doze unintentionally while inactive.  During more active tasks, she never is drowsy.  With regards to daytime napping, the patient reports never taking naps.  She does not report that poor sleep results in mood-related irritability. She does not report that poor sleep results in issues with memory/concentration.    ESS: 6  CAL: 9  FOSQ: 37    REVIEW OF SYSTEMS     REVIEW OF SYSTEMS  Review of Systems   All other systems reviewed and are negative.      ALLERGIES AND MEDICATIONS     ALLERGIES  Allergies   Allergen Reactions    Adhesive Hives     All adhesives (including clonidine patches) caused blistering hives    Gamunex-C [Immune Globul G-Gly-Iga Avg 46] Anaphylaxis     Immune Globulin Hives and Shortness of breath    Immune Globulin,Gamma (Igg) Human Hives and Shortness of breath    Animal Dander Runny nose     Sneezing    Atorvastatin Myalgia    Codeine Nausea/vomiting    House Dust Runny nose     Sneezing    Ropinirole Hives    Tramadol Other     Nausea only    Ciprofloxacin Hives and Rash    Erythromycin Hives and Rash       MEDICATIONS: She has a current medication list which includes the following prescription(s): acetaminophen - Take 2 tablets (650 mg) by mouth every 6 hours if needed for mild pain (1 - 3), acyclovir-hydrocortisone - Xerese 5-1 % External Cream Quantity: 5 Refills: 0 DO Start : 22-Mar-2018 Active, azelastine - USE 1-2 SPRAYS IN EACH NOSTRIL ONCE DAILY TO TWO TIMES A DAY AS NEEDED FOR NASAL DRAINAGE, accu-chek francis plus test strp - Use one strip 3 times daily as directed, calcium citrate - Take 1 tablet (200 mg) by mouth once daily, cetirizine - Take 1 tablet (10 mg) by mouth 2 times a day, cholecalciferol - Take 1 tablet (25 mcg) by mouth once daily, cyanocobalamin - INJECT 1 ML UNDER THE SKIN EVERY 3 WEEKS, cyanocobalamin - Inject 1 mL into the muscle every 3 weeks, epinephrine - USE AS NEEDED AS DIRECTED, esomeprazole - TAKE 1 CAPSULE BY MOUTH EVERY MORNING BEFORE MEALS, ezetimibe - Take 1 tablet (10 mg) by mouth once daily, ferrous fumarate-vitamin c - Take 1 tablet by mouth 3 times a day with meals. Do not crush, chew, or split, fluticasone - Administer 2 sprays into each nostril once daily, fluticasone-umeclidin-vilanter - INHALE 1 PUFF BY MOUTH ONCE DAILY, ginseng - Take 100 mg by mouth once daily, glucosamine-chondroitin - Take 1 tablet by mouth 3 times a day, hizentra - Inject under the skin 1 (one) time per week, ibuprofen - Take 1 tablet (800 mg) by mouth 2 times a day as needed for moderate pain (4 - 6), insulin aspart - INJECT UP TO 18 UNITS UNDER THE SKIN THREE TIMES A DAY BEFORE MEALS (6 UNITS FOR BLOOD GLUCOSE -180, 8 UNITS -220,  10 UNITS -260, 12 UNITS -300, 14 UNITS -350, 16 UNITS -400, 18 UNITS  OR HIGHER), ipratropium-albuterol - Take 3 mL by nebulization 4 times a day as needed for shortness of breath or wheezing, lidocaine-prilocaine - Apply 1 application once a day as needed, nucala - Inject 100 mg (1 pen) under the skin every 4 weeks as directed, metformin - Take 1 tablet (1,000 mg) by mouth once daily with breakfast, montelukast - TAKE 1 TABLET BY MOUTH EVERY DAY, multivitamin - Take 1 tablet by mouth once daily, monoject hypodermic needles - Use as directed for injections, 2 every four weeks, monoject hypodermic needles - Use for 2 injections every 4 weeks, omega 3-dha-epa-fish oil - Take 1 capsule (1,200 mg) by mouth once daily, omeprazole - Take 1 capsule (40 mg) by mouth once daily in the morning. Take before meals. Do not crush or chew, onabotulinumtoxina - PHYSICIAN TO INJECT UP  UNITS INTRAMUSCULARLY EVERY 90 DAYS. FOR OFFICE USE ONLY, pen needle, diabetic - Use 1 pen needle to inject insulin under the skin 3 times a day before meals, magnesium, potassium aspartate - Take 2 capsules by mouth once daily, resveratrol - Take 1,450 mg by mouth once daily, sulfamethoxazole-trimethoprim - TAKE 1 TABLET BY MOUTH ONCE DAILY, monoject tuberculin syringe - Use 2 syringes to inject every month as directed, monoject tuberculin syringe - Use 2 syringes every month, mounjaro - Inject 10 mg under the skin 1 (one) time per week, turmeric - Take by mouth, amoxicillin-potassium clavulanate - Take 2 tablets (2,000 mg of amoxicillin) by mouth every 12 hours (Patient not taking: Reported on 4/24/2024), clobetasol - Apply 1 application 2 times daily for 2 weeks and then as needed for 2 weeks (Patient not taking: Reported on 4/24/2024), gabapentin - Take 2 capsules (600 mg) by mouth once daily at bedtime, levothyroxine - TAKE 1 TABLET BY MOUTH ONCE DAILY, montelukast - TAKE 1 TABLET BY MOUTH ONCE DAILY,  rizatriptan - TAKE 1 TABLET BY MOUTH AT BEGINNING OF MIGRAINE. TAKE 1 BY MOUTH EVERY 2 HOURS IF MIGRAINE NOT RESOLVED. MAXIMUM OF 3 TABLET BY MOUTH IN 24 HOURS, trazodone - Take by mouth, and ventolin hfa - INHALE 2 PUFFS BY MOUTH EVERY 4 HOURS AS NEEDED (Patient taking differently: Inhale 2 puffs every 4 hours if needed for wheezing.), and the following Facility-Administered Medications: onabotulinumtoxina.    PAST MEDICAL HISTORY : She has Abnormal mammogram; Arthritis; Asthma ([facility]-H[facility]); Sleep apnea; Astigmatism, bilateral; Bariatric surgery status; Bilateral presbyopia; Bilateral sensorineural hearing loss; Bilateral tinnitus; Chronic fatigue syndrome; Chronic sinusitis; Combined hyperlipidemia; Common variable immunodeficiency (Multi); Dermatochalasis of right upper eyelid; Diabetes mellitus type 2 without retinopathy (Multi); GERD (gastroesophageal reflux disease); Hemifacial spasm of left side of face; Hypothyroidism due to Hashimoto's thyroiditis; Insomnia; Lump or mass in breast; Nuclear sclerosis of both eyes; Restless legs syndrome; Vitamin B12 deficiency; Vitamin D deficiency, unspecified; Class 2 severe obesity due to excess calories with serious comorbidity and body mass index (BMI) of 38.0 to 38.9 in adult (Multi); Aortic stenosis; Chronic respiratory failure (Multi); Diaphragm dysfunction; Generalized abdominal pain; Brow ptosis, bilateral; Dyspnea; Iron deficiency; Posterior tibial tendon dysfunction; Primary localized osteoarthrosis of ankle and foot; Pronation of foot; Steatoblepharon; Plantar fasciitis; Osteoarthritis of knee; Bronchiectasis, uncomplicated (Multi); and Statin intolerance on their problem list.    PAST SURGICAL HISTORY: She  has a past surgical history that includes Colonoscopy (11/04/2013); Tonsillectomy (11/03/2016); Thyroid surgery (11/03/2016); Gallbladder surgery (11/03/2016); Hysterectomy (11/03/2016); Total knee arthroplasty (06/07/2020); Hernia repair (12/13/2016); and  "Ventral hernia repair (09/17/2018).     FAMILY HISTORY: No changes since previous visit. Otherwise non-contributory as charted.     SOCIAL HISTORY  She  reports that she has quit smoking. Her smoking use included cigarettes. She has never used smokeless tobacco. She reports that she does not drink alcohol and does not use drugs. She works in [employer] at . She does have 3-4 cups of coffee with one after dinner.      PHYSICAL EXAM     Physical Examination: /63 (BP Location: Right arm, Patient Position: Sitting, BP Cuff Size: Adult)   Pulse 55   Temp 36.1 °C (97 °F) (Temporal)   Ht 1.727 m (5' 8\")   Wt 112 kg (247 lb 3.2 oz)   LMP  (LMP Unknown)   BMI 37.59 kg/m²     PREVIOUS WEIGHTS:  Wt Readings from Last 3 Encounters:   04/24/24 112 kg (247 lb 3.2 oz)   03/28/24 110 kg (242 lb 8.1 oz)   03/20/24 110 kg (243 lb)     No exam today    RESULTS/DATA     Iron (ug/dL)   Date Value   12/13/2023 44   08/09/2023 58     % Saturation (%)   Date Value   12/13/2023 9 (L)     Iron Saturation (%)   Date Value   08/09/2023 13 (L)     TIBC (ug/dL)   Date Value   12/13/2023 472 (H)   08/09/2023 459 (H)     Ferritin (ug/L)   Date Value   08/09/2023 63       Bicarbonate (mmol/L)   Date Value   04/10/2024 27   12/08/2023 31   07/21/2023 23   05/10/2023 22   04/30/2023 20 (L)       PAP Adherence  Need to get from [facility]    DIAGNOSES     Problem List and Orders  Diagnoses and all orders for this visit:  Disorder of iron metabolism  -     Ferritin; Future  -     Iron and TIBC; Future  JOSÉ MIGUEL (obstructive sleep apnea)  -     Follow Up In Adult Sleep Medicine  -     Follow Up In Adult Sleep Medicine; Future  RLS (restless legs syndrome)  -     Follow Up In Adult Sleep Medicine  -     Ferritin; Future  -     Iron and TIBC; Future  Chronic respiratory failure, unspecified whether with hypoxia or hypercapnia (Multi)  Class 2 severe obesity due to excess calories with serious comorbidity and body mass index (BMI) of 39.0 to 39.9 in adult " (Multi)  Iron deficiency          ASSESSMENT/PLAN     Ms. Morris is a 64 y.o. female and with past medical history significant for JOSÉM IGUEL on CPAP, CVID, aortic stenosis, T2DM, asthma, right hemidiaphragm paralysis s/p b/l pacer on 6/19/23, CFS, GERD, hypothyroid.  She returns in followup to  the [facility] for their RLS and sleep apnea.    JOSÉ MIGUEL.  She has mild JOSÉ MIGUEL and is continues on bilevel at 8.5/4.  She is tolerating well and finds this to be comfortable at this point in time.  Her DME is [facility].  She will send the SN to see if we can get the PAP report from [facility]    RLS.  Much improved from last visit with 80% improvement.  She still finds the need to continue with gabapentin which we will continue at 600 mg.  I would like to get an iron level now to see how much increase it is relative to the preinfusion baseline.  She should continue with the oral iron replacement.    Right hemidiaphragm paralysis. Benefits from using it but not clear that diaphragm function has returned. Continues to follow with Dr. Torres.  Will see his team in follow-up soon.    Obesity.  The patient was counseled that her weight is the strongest modifiable risk factor and contributor for JOSÉ MIGUEL. She was counseled to consider weight loss options to include changes in dietary habits and activity. We briefly discussed the use of calorie tracking smartphone apps and the use of activity meters to provide feedback that helps in losing weight.  Before initiating an exercise plan, she should carefully review the approach with her primary care provider.       Follow up: 3 months         Melani Wong MD PhD

## 2024-04-24 ENCOUNTER — OFFICE VISIT (OUTPATIENT)
Dept: SLEEP MEDICINE | Facility: CLINIC | Age: 64
End: 2024-04-24
Payer: COMMERCIAL

## 2024-04-24 VITALS
DIASTOLIC BLOOD PRESSURE: 63 MMHG | TEMPERATURE: 97 F | BODY MASS INDEX: 37.47 KG/M2 | WEIGHT: 247.2 LBS | SYSTOLIC BLOOD PRESSURE: 113 MMHG | HEIGHT: 68 IN | HEART RATE: 55 BPM

## 2024-04-24 DIAGNOSIS — G25.81 RLS (RESTLESS LEGS SYNDROME): ICD-10-CM

## 2024-04-24 DIAGNOSIS — J96.10 CHRONIC RESPIRATORY FAILURE, UNSPECIFIED WHETHER WITH HYPOXIA OR HYPERCAPNIA (MULTI): ICD-10-CM

## 2024-04-24 DIAGNOSIS — G47.33 OSA (OBSTRUCTIVE SLEEP APNEA): ICD-10-CM

## 2024-04-24 DIAGNOSIS — E83.10 DISORDER OF IRON METABOLISM: Primary | ICD-10-CM

## 2024-04-24 DIAGNOSIS — E66.01 CLASS 2 SEVERE OBESITY DUE TO EXCESS CALORIES WITH SERIOUS COMORBIDITY AND BODY MASS INDEX (BMI) OF 39.0 TO 39.9 IN ADULT (MULTI): ICD-10-CM

## 2024-04-24 DIAGNOSIS — E61.1 IRON DEFICIENCY: ICD-10-CM

## 2024-04-24 PROCEDURE — 3078F DIAST BP <80 MM HG: CPT | Performed by: INTERNAL MEDICINE

## 2024-04-24 PROCEDURE — 3044F HG A1C LEVEL LT 7.0%: CPT | Performed by: INTERNAL MEDICINE

## 2024-04-24 PROCEDURE — 1159F MED LIST DOCD IN RCRD: CPT | Performed by: INTERNAL MEDICINE

## 2024-04-24 PROCEDURE — 3049F LDL-C 100-129 MG/DL: CPT | Performed by: INTERNAL MEDICINE

## 2024-04-24 PROCEDURE — 99214 OFFICE O/P EST MOD 30 MIN: CPT | Performed by: INTERNAL MEDICINE

## 2024-04-24 PROCEDURE — 3008F BODY MASS INDEX DOCD: CPT | Performed by: INTERNAL MEDICINE

## 2024-04-24 PROCEDURE — 3074F SYST BP LT 130 MM HG: CPT | Performed by: INTERNAL MEDICINE

## 2024-04-24 PROCEDURE — 1036F TOBACCO NON-USER: CPT | Performed by: INTERNAL MEDICINE

## 2024-04-24 NOTE — PATIENT INSTRUCTIONS
[facility]  [address]  [facility]  [address]  [facility] [address]    [address]  [facility]  [address]  [facility] [address]  [facility]  [address]  [facility] [address]           NAME: Lara Morris   DATE: 4/24/2024     Your Sleep Provider Today: Melani Wong MD PhD  Your Primary Care Physician: Chan Hernandez MD   Your Referring Provider: Melani Wong MD PhD    Thank you for coming to the Sleep Medicine Clinic today! Your sleep medicine provider today was: Melani Wong MD PhD Below is a summary of your treatment plan, other important information, and our contact numbers:  If you need to schedule an appointment, please call [phone])  If you need general assistance (e.g. forms completed, general questions), please call my , Kayla, at [phone].  If you have a medical question about your sleep issues, please contact our nurses, Jo Ann or Jaylin at [phone].   You can also contact us through [employer].      DIAGNOSIS:   1. RLS (restless legs syndrome)  Follow Up In Adult Sleep Medicine      2. JOSÉ MIGUEL (obstructive sleep apnea)  Follow Up In Adult Sleep Medicine              TREATMENT PLAN     Get iron studies  Continue the iron supplement 1x/day  Continue with gabepentin 600mg for the RLS  Continue BPAP    Follow-up Appointment:   Followup with me in 3-4 months.      IMPORTANT INFORMATION     Call 911 for medical emergencies.  Our offices are generally open from Monday-Friday, 9 am - 5 pm.  If you need to get in touch with me, you may either call me and my team(number is below) or you can use [employer].  If a referral for a test, for CPAP, or for another specialist was made, and you have not heard about scheduling this within a week, please call scheduling at [phone] ([phone]).  If you are unable to make your appointment for clinic or an overnight study,  kindly call the office at least 48 hours in advance to cancel and reschedule.  If you are on CPAP, please bring your device's card or the device to each clinic appointment.   There are no supporting services by either the sleep doctors or their staff on weekends and Holidays, or after 5 PM on weekdays.   If you have been asked to come to a sleep study, make sure you bring toiletries, a comfy pillow, and any nighttime medications that you may regularly take. Also be sure to eat dinner before you arrive. We generally do not provide meals.      PRESCRIPTIONS     We require 7 days advanced notice for prescription refills. If we do not receive the request in this time, we cannot guarantee that your medication will be refilled in time.      IMPORTANT PHONE NUMBERS      scheduling for medical testin117 - [phone]   Sleep Medicine Clinic Fax: [phone]  Appointments (for Pediatric Sleep Clinic): [phone] - option 1  Appointments (for Adult Sleep Clinic): [phone] - option 2  Appointments (For Sleep Studies): [phone] - option 3  Behavioral Sleep Medicine: [phone]  Bariatric Surgery: [phone] ( Bariatric Surgery Website)   Sleep Surgery: [phone]  ENT (Otolaryngology): [phone]  Headache Clinic (Neurology): [phone]  Neurology: [phone]  Psychiatry: [phone]  Pulmonary Function Testing (PFT) Center: [phone]  Pulmonary Medicine: [phone]  [employer] (DME): [phone]  [employer] [facility] (DME): [phone]  [facility] (DME): [phone]      COMMON PROVIDERS WE REFER TO     For Weight Loss - Dr. Stephen Seth - Call [phone]  For Sleep Surgery - Dr. August Godinez - Call [phone]      OUR ADULT SLEEP MEDICINE TEAM   Please do not hesitate to call the office or sleep nurse with any questions between appointments:    Adult Sleep Nurses (Jaylin Wilson RN and Jo Ann Mcgarry,  RN):  For clinical questions and refilling prescriptions: [phone]  Email sleep diaries and other documents at: [email or web address]    Adult Sleep Medicine Secretaries:  Lyn Freeman (For Nabila/[place]/Krise/Strohl/Annemarie/Cabrera):   P: [phone]  F: [phone]  Kayla Gregg (For Wong/Yonnyler): P: [phone]  Fax: [phone]  Kailee Dubon (For Jurcevic/Blank): P: [phone]  F: [phone]  Kathrin Finley (For [place]): P: [phone]  F: [phone]  Ashlee Mcarthur (For Karyna/Beau/Zakhary): P: [phone]  F: [phone]  Olga Brooke (For [place]): P: [phone]  F: [phone]     Adult Sleep Medicine Advanced Practice Providers:  Cm Hare ([place], [place])  Karlie Yanez ([facility])  Lesa Pérez CNP ([place], [place], [place])  Rocío Henry CNP ([place], [place], [place])  Briseida Garcia ([place], [place], [place])  Maninder Butcher CNP ([facility])        OUR SLEEP TESTING LOCATIONS     Our team will contact you to schedule your sleep study, however, you can contact us as follow:  Main Phone Line (scheduling only): [phone]), option 3  Adult and Pediatric Locations  [facility] (6 years and older): [facility] - 4th floor ([address] [place] [address]) After hours line: [phone]  [facility] (Main campus: All ages): [facility], 6th floor. After hours line: [phone]  [facility] (5 years and older; younger considered on case-by-case basis): [facility]; Other [address], [address]. Scheduling  After hours line: [phone]   [place] (6 years and older): [address]; [address]; [address]  [facility] (6 years and older): [address] [facility] [address] [facility], [address]  After hours line: [phone]   [place] (13 years and older) in [place]: [address], 2nd floor  After hours line: [phone]  [facility] (13 year and  "older): [address]  After hours line: [phone]     Adult Only Locations:   [place] (18 years and older): [address] [place] [address], 2nd floor   [place] (18 years and older): [address] [facility]; 4th floor  After hours line: [phone]   [place] (18 years and older) at [place]: [address] [place] [address]  After hours line: [phone]          CONTACTING YOUR SLEEP MEDICINE PROVIDER     Send a message directly to your provider through \"My Chart\", which is the email service through your  Records Account: [email or web address]   Call [phone] and leave a message. One of the administrative assistants will forward the message to your sleep medicine provider through \"My Chart\" and/or email.     Your sleep medicine provider for this visit was: Melani Wong MD PhD        "

## 2024-04-25 ENCOUNTER — SPECIALTY PHARMACY (OUTPATIENT)
Dept: PHARMACY | Facility: CLINIC | Age: 64
End: 2024-04-25

## 2024-04-25 PROCEDURE — RXMED WILLOW AMBULATORY MEDICATION CHARGE

## 2024-04-25 RX ORDER — GABAPENTIN 100 MG/1
100 CAPSULE ORAL 3 TIMES DAILY
Qty: 270 CAPSULE | Refills: 2 | Status: SHIPPED | OUTPATIENT
Start: 2024-04-25 | End: 2025-04-24

## 2024-04-26 ENCOUNTER — PHARMACY VISIT (OUTPATIENT)
Dept: PHARMACY | Facility: CLINIC | Age: 64
End: 2024-04-26
Payer: COMMERCIAL

## 2024-04-26 PROCEDURE — RXMED WILLOW AMBULATORY MEDICATION CHARGE

## 2024-04-29 PROBLEM — G25.81 RLS (RESTLESS LEGS SYNDROME): Status: ACTIVE | Noted: 2024-04-29

## 2024-04-30 ENCOUNTER — PHARMACY VISIT (OUTPATIENT)
Dept: PHARMACY | Facility: CLINIC | Age: 64
End: 2024-04-30
Payer: COMMERCIAL

## 2024-05-01 PROCEDURE — RXMED WILLOW AMBULATORY MEDICATION CHARGE

## 2024-05-03 ENCOUNTER — PHARMACY VISIT (OUTPATIENT)
Dept: PHARMACY | Facility: CLINIC | Age: 64
End: 2024-05-03
Payer: COMMERCIAL

## 2024-05-04 ENCOUNTER — PHARMACY VISIT (OUTPATIENT)
Dept: PHARMACY | Facility: CLINIC | Age: 64
End: 2024-05-04

## 2024-05-08 ENCOUNTER — HOME INFUSION (OUTPATIENT)
Dept: INFUSION THERAPY | Age: 64
End: 2024-05-08
Payer: COMMERCIAL

## 2024-05-08 NOTE — PROGRESS NOTES
Review of chart - pt with order for Hizentra 24gm weekly. Order is good for 6 fills total. Auth is good through 7/24/24. May FOTM verified. Pt has been set up with copay assistance program.     Pt contacted, will need doses for pickup on 5/10. Just received Emla cream.     Pharmacy to dispense the following 5/9 and for pt pickup 5/10:  8x Hizentra 10gm/50mL vials  4x Hizentra 4gm/20mL vials  For DOS: 5/11 - 6/7     Pt to  from pharmacy - do not deliver to pt     FU 6/3 check next delivery date, check inventory and needs, check FOTM. Next dose around 6/8

## 2024-05-09 ENCOUNTER — DOCUMENTATION (OUTPATIENT)
Dept: INFUSION THERAPY | Age: 64
End: 2024-05-09
Payer: COMMERCIAL

## 2024-05-09 NOTE — PROGRESS NOTES
Ticket made from pharmacist note:    Delivery will be picked up from warehouse or office staff tomorrow by 11 am tomorrow. Signature requested but not required.     Sending standard supplies for 4 doses of Hizentra delivered subcutaneously  via [employer] . Pt requests both 6mm and 9mm needle sets, sending 2 of each. Pt currently good on emla cream.     Patient previously spoke with a pharmacist.

## 2024-05-10 ENCOUNTER — HOSPITAL ENCOUNTER (OUTPATIENT)
Dept: RADIOLOGY | Facility: HOSPITAL | Age: 64
Discharge: HOME | End: 2024-05-10
Payer: COMMERCIAL

## 2024-05-10 ENCOUNTER — OFFICE VISIT (OUTPATIENT)
Dept: SURGERY | Facility: CLINIC | Age: 64
End: 2024-05-10
Payer: COMMERCIAL

## 2024-05-10 VITALS
HEIGHT: 68 IN | SYSTOLIC BLOOD PRESSURE: 124 MMHG | BODY MASS INDEX: 37.13 KG/M2 | DIASTOLIC BLOOD PRESSURE: 80 MMHG | WEIGHT: 245 LBS | HEART RATE: 68 BPM | TEMPERATURE: 96.8 F | OXYGEN SATURATION: 100 %

## 2024-05-10 DIAGNOSIS — J98.6 DIAPHRAGM DYSFUNCTION: Primary | ICD-10-CM

## 2024-05-10 DIAGNOSIS — J98.6 DIAPHRAGM DYSFUNCTION: ICD-10-CM

## 2024-05-10 PROCEDURE — 3049F LDL-C 100-129 MG/DL: CPT | Performed by: NURSE PRACTITIONER

## 2024-05-10 PROCEDURE — RXMED WILLOW AMBULATORY MEDICATION CHARGE

## 2024-05-10 PROCEDURE — 1159F MED LIST DOCD IN RCRD: CPT | Performed by: NURSE PRACTITIONER

## 2024-05-10 PROCEDURE — 3074F SYST BP LT 130 MM HG: CPT | Performed by: NURSE PRACTITIONER

## 2024-05-10 PROCEDURE — 3044F HG A1C LEVEL LT 7.0%: CPT | Performed by: NURSE PRACTITIONER

## 2024-05-10 PROCEDURE — 1036F TOBACCO NON-USER: CPT | Performed by: NURSE PRACTITIONER

## 2024-05-10 PROCEDURE — 3008F BODY MASS INDEX DOCD: CPT | Performed by: NURSE PRACTITIONER

## 2024-05-10 PROCEDURE — 71046 X-RAY EXAM CHEST 2 VIEWS: CPT

## 2024-05-10 PROCEDURE — 99214 OFFICE O/P EST MOD 30 MIN: CPT | Performed by: NURSE PRACTITIONER

## 2024-05-10 PROCEDURE — 76000 FLUOROSCOPY <1 HR PHYS/QHP: CPT

## 2024-05-10 PROCEDURE — 1126F AMNT PAIN NOTED NONE PRSNT: CPT | Performed by: NURSE PRACTITIONER

## 2024-05-10 PROCEDURE — 71046 X-RAY EXAM CHEST 2 VIEWS: CPT | Performed by: RADIOLOGY

## 2024-05-10 PROCEDURE — 3079F DIAST BP 80-89 MM HG: CPT | Performed by: NURSE PRACTITIONER

## 2024-05-10 ASSESSMENT — PAIN SCALES - GENERAL: PAINLEVEL: 0-NO PAIN

## 2024-05-10 NOTE — PROGRESS NOTES
Lara Hollins presents today for routine follow up from Diaphragm Pacing implant done July 1, 2023 for right HD paralysis.   She uses DP full time since implant.   She reports she overall feels much better since implant. She reported this last visit, she doesn't think she feels better now compare to then but overall much improved. She is able to walk 2-3 miles daily.     She is under new therapy for her metabolic syndrome.   She also reported she had PFT's done and they were significantly better but I am unable to locate results.     Exam: overweight female, alert oriented, stable gait. No distress, no increase work of breathing, color pink, no edema, abd soft, there are some red areas on skin (tape reaction) but mild, pacing wire sites intact.     Today we did CXR and SNIFF.  There is positive movement of the right side on SNIFF. She had movement prior. There appears to be more lateral wall movement today. CXR also shows improved aeration laterally. The right HD dome is still elevated.   EMG was done today will be reviewed when loaded.     Overall, we have improvement of symptoms and radiology exam. We will continue pacing for now. She can remove pacer for activities in which it interferes. We will see again in 3-4 months.

## 2024-05-14 PROCEDURE — RXMED WILLOW AMBULATORY MEDICATION CHARGE

## 2024-05-15 DIAGNOSIS — G25.81 RLS (RESTLESS LEGS SYNDROME): ICD-10-CM

## 2024-05-15 PROCEDURE — RXMED WILLOW AMBULATORY MEDICATION CHARGE

## 2024-05-15 RX ORDER — GABAPENTIN 300 MG/1
600 CAPSULE ORAL NIGHTLY
Qty: 60 CAPSULE | Refills: 3 | Status: SHIPPED | OUTPATIENT
Start: 2024-05-15 | End: 2024-09-12

## 2024-05-17 ENCOUNTER — SPECIALTY PHARMACY (OUTPATIENT)
Dept: PHARMACY | Facility: CLINIC | Age: 64
End: 2024-05-17

## 2024-05-17 PROCEDURE — RXMED WILLOW AMBULATORY MEDICATION CHARGE

## 2024-05-21 ENCOUNTER — OFFICE VISIT (OUTPATIENT)
Dept: ORTHOPEDIC SURGERY | Facility: HOSPITAL | Age: 64
End: 2024-05-21
Payer: COMMERCIAL

## 2024-05-21 ENCOUNTER — PHARMACY VISIT (OUTPATIENT)
Dept: PHARMACY | Facility: CLINIC | Age: 64
End: 2024-05-21
Payer: COMMERCIAL

## 2024-05-21 ENCOUNTER — HOSPITAL ENCOUNTER (OUTPATIENT)
Dept: RADIOLOGY | Facility: EXTERNAL LOCATION | Age: 64
Discharge: HOME | End: 2024-05-21

## 2024-05-21 DIAGNOSIS — M16.11 OSTEOARTHRITIS OF RIGHT HIP, UNSPECIFIED OSTEOARTHRITIS TYPE: Primary | ICD-10-CM

## 2024-05-21 DIAGNOSIS — M47.816 ARTHRITIS, LUMBAR SPINE: ICD-10-CM

## 2024-05-21 PROCEDURE — RXMED WILLOW AMBULATORY MEDICATION CHARGE

## 2024-05-21 PROCEDURE — 3044F HG A1C LEVEL LT 7.0%: CPT | Performed by: STUDENT IN AN ORGANIZED HEALTH CARE EDUCATION/TRAINING PROGRAM

## 2024-05-21 PROCEDURE — 20611 DRAIN/INJ JOINT/BURSA W/US: CPT | Mod: RT | Performed by: STUDENT IN AN ORGANIZED HEALTH CARE EDUCATION/TRAINING PROGRAM

## 2024-05-21 PROCEDURE — 99204 OFFICE O/P NEW MOD 45 MIN: CPT | Performed by: STUDENT IN AN ORGANIZED HEALTH CARE EDUCATION/TRAINING PROGRAM

## 2024-05-21 PROCEDURE — 99214 OFFICE O/P EST MOD 30 MIN: CPT | Mod: GC | Performed by: STUDENT IN AN ORGANIZED HEALTH CARE EDUCATION/TRAINING PROGRAM

## 2024-05-21 PROCEDURE — 1159F MED LIST DOCD IN RCRD: CPT | Performed by: STUDENT IN AN ORGANIZED HEALTH CARE EDUCATION/TRAINING PROGRAM

## 2024-05-21 PROCEDURE — 2500000005 HC RX 250 GENERAL PHARMACY W/O HCPCS: Performed by: STUDENT IN AN ORGANIZED HEALTH CARE EDUCATION/TRAINING PROGRAM

## 2024-05-21 PROCEDURE — 3049F LDL-C 100-129 MG/DL: CPT | Performed by: STUDENT IN AN ORGANIZED HEALTH CARE EDUCATION/TRAINING PROGRAM

## 2024-05-21 PROCEDURE — 2500000004 HC RX 250 GENERAL PHARMACY W/ HCPCS (ALT 636 FOR OP/ED): Performed by: STUDENT IN AN ORGANIZED HEALTH CARE EDUCATION/TRAINING PROGRAM

## 2024-05-21 PROCEDURE — 3008F BODY MASS INDEX DOCD: CPT | Performed by: STUDENT IN AN ORGANIZED HEALTH CARE EDUCATION/TRAINING PROGRAM

## 2024-05-21 RX ORDER — ROPIVACAINE HYDROCHLORIDE 5 MG/ML
3 INJECTION, SOLUTION EPIDURAL; INFILTRATION; PERINEURAL
Status: COMPLETED | OUTPATIENT
Start: 2024-05-21 | End: 2024-05-21

## 2024-05-21 RX ORDER — METHYLPREDNISOLONE ACETATE 40 MG/ML
40 INJECTION, SUSPENSION INTRA-ARTICULAR; INTRALESIONAL; INTRAMUSCULAR; SOFT TISSUE
Status: COMPLETED | OUTPATIENT
Start: 2024-05-21 | End: 2024-05-21

## 2024-05-21 RX ORDER — LIDOCAINE HYDROCHLORIDE 10 MG/ML
2 INJECTION, SOLUTION EPIDURAL; INFILTRATION; INTRACAUDAL; PERINEURAL
Status: COMPLETED | OUTPATIENT
Start: 2024-05-21 | End: 2024-05-21

## 2024-05-21 RX ADMIN — LIDOCAINE HYDROCHLORIDE 2 ML: 10 INJECTION, SOLUTION EPIDURAL; INFILTRATION; INTRACAUDAL; PERINEURAL at 17:20

## 2024-05-21 RX ADMIN — ROPIVACAINE HYDROCHLORIDE 3 ML: 5 INJECTION, SOLUTION EPIDURAL; INFILTRATION; PERINEURAL at 17:20

## 2024-05-21 RX ADMIN — METHYLPREDNISOLONE ACETATE 40 MG: 40 INJECTION, SUSPENSION INTRA-ARTICULAR; INTRALESIONAL; INTRAMUSCULAR; SOFT TISSUE at 17:20

## 2024-05-21 NOTE — PROGRESS NOTES
REFERRAL SOURCE: No ref. provider found     CHIEF COMPLAINT: right hip and low back pain    HISTORY OF PRESENT ILLNESS  Lara Morris is a very pleasant 64 y.o. female who works as a homecare OT manager with history of HLD, HLD, diaphragmatic pacer, GERD, immunodeficiency, b/l knee replacements done 15 years ago, diabetes (A1c 4/10/24 was 5.3%)  who is here for evaluation of right hip and low back pain.     5/21/24: Reports right lower back pain beginning about a year ago, groin pain 4-5 months ago without inciting event. Pain has been progressively worsening, unable to lay on her sides due to pain. Describes pain as dull, achy without radiation. Back pain is worse in the AM, groin pain worsens as day goes on. Pain worsened by twisting, bending down and improves with ibuprofen and tylenol as well as sitting. No prior therapy or injections into back or hip. Lost 60 lbs since June. Had x rays of the spine and hips done in the summer of 2023.    MEDS    Current Outpatient Medications:     acetaminophen (Tylenol) 325 mg tablet, Take 2 tablets (650 mg) by mouth every 6 hours if needed for mild pain (1 - 3)., Disp: , Rfl:     acyclovir-hydrocortisone 5-1 % cream, Xerese 5-1 % External Cream Quantity: 5 Refills: 0 DO Start : 22-Mar-2018 Active, Disp: , Rfl:     amoxicillin-potassium clavulanate (Augmentin XR) 1,000-62.5 mg 12 hr tablet, Take 2 tablets (2,000 mg of amoxicillin) by mouth every 12 hours. (Patient not taking: Reported on 4/24/2024), Disp: 56 tablet, Rfl: 2    azelastine (Astelin) 137 mcg (0.1 %) nasal spray, USE 1-2 SPRAYS IN EACH NOSTRIL ONCE DAILY TO TWO TIMES A DAY AS NEEDED FOR NASAL DRAINAGE, Disp: 90 mL, Rfl: 3    blood sugar diagnostic (Accu-Chek Zakiya Plus test strp) strip, Use one strip 3 times daily as directed, Disp: 300 strip, Rfl: 3    calcium citrate (Calcitrate) 200 mg (950 mg) tablet, Take 1 tablet (200 mg) by mouth once daily., Disp: , Rfl:     cetirizine (ZyrTEC) 10 mg tablet, Take 1 tablet (10  mg) by mouth 2 times a day., Disp: , Rfl:     cholecalciferol (Vitamin D-3) 25 MCG (1000 UT) tablet, Take 1 tablet (25 mcg) by mouth once daily., Disp: , Rfl:     clobetasol (Temovate) 0.05 % ointment, Apply 1 application 2 times daily for 2 weeks and then as needed for 2 weeks. (Patient not taking: Reported on 4/24/2024), Disp: 45 g, Rfl: 0    cyanocobalamin (Vitamin B-12) 1,000 mcg/mL injection, Inject 1 mL into the muscle every 3 weeks., Disp: 3 mL, Rfl: 4    EPINEPHrine (Epipen) 0.3 mg/0.3 mL injection syringe, USE AS NEEDED AS DIRECTED, Disp: 10 each, Rfl: 6    esomeprazole (NexIUM) 40 mg DR capsule, TAKE 1 CAPSULE BY MOUTH EVERY MORNING BEFORE MEALS, Disp: 90 capsule, Rfl: 3    ezetimibe (Zetia) 10 mg tablet, Take 1 tablet (10 mg) by mouth once daily., Disp: , Rfl:     ferrous fumarate-vitamin C (Romulo-Sequeles 65-25), Take 1 tablet by mouth 3 times a day with meals. Do not crush, chew, or split., Disp: , Rfl:     fluticasone (Flonase) 50 mcg/actuation nasal spray, Administer 2 sprays into each nostril once daily., Disp: , Rfl:     fluticasone-umeclidin-vilanter (TRELEGY-ELLIPTA) 200-62.5-25 mcg blister with device, INHALE 1 PUFF BY MOUTH ONCE DAILY, Disp: 60 each, Rfl: 11    gabapentin (Neurontin) 100 mg capsule, TAKE 1 CAPSULE BY MOUTH THREE TIMES A DAY, Disp: 270 capsule, Rfl: 2    gabapentin (Neurontin) 300 mg capsule, Take 2 capsules (600 mg) by mouth once daily at bedtime., Disp: 60 capsule, Rfl: 3    ginseng 100 mg capsule, Take 100 mg by mouth once daily., Disp: , Rfl:     glucosamine-chondroitin 500-400 mg tablet, Take 1 tablet by mouth 3 times a day., Disp: , Rfl:     Hizentra subcutaneous infusion, Inject under the skin 1 (one) time per week., Disp: , Rfl:     ibuprofen 800 mg tablet, Take 1 tablet (800 mg) by mouth 2 times a day as needed for moderate pain (4 - 6)., Disp: 180 tablet, Rfl: 1    insulin aspart (NovoLOG) 100 unit/mL (3 mL) pen, INJECT UP TO 18 UNITS UNDER THE SKIN THREE TIMES A DAY  "BEFORE MEALS (6 UNITS FOR BLOOD GLUCOSE -180, 8 UNITS -220, 10 UNITS -260, 12 UNITS -300, 14 UNITS -350, 16 UNITS -400, 18 UNITS  OR HIGHER)., Disp: 15 mL, Rfl: 3    ipratropium-albuteroL (Duo-Neb) 0.5-2.5 mg/3 mL nebulizer solution, Take 3 mL by nebulization 4 times a day as needed for shortness of breath or wheezing., Disp: , Rfl:     levothyroxine (Synthroid, Levoxyl) 150 mcg tablet, TAKE 1 TABLET BY MOUTH ONCE DAILY, Disp: 90 tablet, Rfl: 3    lidocaine-prilocaine (Emla) 2.5-2.5 % cream, Apply 1 application once a day as needed, Disp: 60 g, Rfl: 6    mepolizumab (Nucala) 100 mg/mL auto-injector, Inject 100 mg (1 pen) under the skin every 4 weeks as directed, Disp: 1 mL, Rfl: 12    metFORMIN (Glucophage) 1,000 mg tablet, Take 1 tablet (1,000 mg) by mouth once daily with breakfast., Disp: 30 tablet, Rfl: 11    montelukast (Singulair) 10 mg tablet, TAKE 1 TABLET BY MOUTH ONCE DAILY, Disp: 90 tablet, Rfl: 5    montelukast (Singulair) 10 mg tablet, TAKE 1 TABLET BY MOUTH EVERY DAY, Disp: 90 tablet, Rfl: 3    MULTIVITAMIN ORAL, Take 1 tablet by mouth once daily., Disp: , Rfl:     needle, disp, 25 gauge (Monoject Hypodermic Needles) 25 gauge x 5/8\" needle, Use as directed for injections, 2 every four weeks, Disp: 2 each, Rfl: 11    needle, disp, 25 gauge (Monoject Hypodermic Needles) 25 gauge x 5/8\" needle, Use for 2 injections every 4 weeks, Disp: 2 each, Rfl: 11    omega 3-dha-epa-fish oil (Fish OiL) 1,200 (144-216) mg capsule, Take 1 capsule (1,200 mg) by mouth once daily., Disp: , Rfl:     omeprazole (PriLOSEC) 40 mg DR capsule, Take 1 capsule (40 mg) by mouth once daily in the morning. Take before meals. Do not crush or chew., Disp: 90 capsule, Rfl: 3    onabotulinumtoxinA (Botox) 100 unit injection, PHYSICIAN TO INJECT UP  UNITS INTRAMUSCULARLY EVERY 90 DAYS. FOR OFFICE USE ONLY, Disp: 1 each, Rfl: 3    pen needle, diabetic (BD Cici 2nd Gen Pen Needle) 32 " "gauge x 5/32\" needle, Use 1 pen needle to inject insulin under the skin 3 times a day before meals, Disp: 300 each, Rfl: 3    potassium &magnesium aspartate (magnesium, potassium aspartate) 250-250 mg capsule, Take 2 capsules by mouth once daily., Disp: , Rfl:     RESVERATROL ORAL, Take 1,450 mg by mouth once daily., Disp: , Rfl:     rizatriptan (Maxalt) 10 mg tablet, TAKE 1 TABLET BY MOUTH AT BEGINNING OF MIGRAINE. TAKE 1 BY MOUTH EVERY 2 HOURS IF MIGRAINE NOT RESOLVED. MAXIMUM OF 3 TABLET BY MOUTH IN 24 HOURS, Disp: 45 tablet, Rfl: 11    syringe, disposable, (Monoject Tuberculin Syringe) 1 mL syringe, Use 2 syringes to inject every month as directed, Disp: 2 each, Rfl: 11    syringe, disposable, (Monoject Tuberculin Syringe) 1 mL syringe, Use 2 syringes every month, Disp: 2 each, Rfl: 11    tirzepatide (Mounjaro) 10 mg/0.5 mL pen injector, Inject 10 mg under the skin 1 (one) time per week., Disp: 2 mL, Rfl: 11    traZODone (Desyrel) 100 mg tablet, Take by mouth., Disp: , Rfl:     turmeric 400 mg capsule, Take by mouth., Disp: , Rfl:     Ventolin HFA 90 mcg/actuation inhaler, INHALE 2 PUFFS BY MOUTH EVERY 4 HOURS AS NEEDED (Patient taking differently: Inhale 2 puffs every 4 hours if needed for wheezing.), Disp: 54 g, Rfl: 11    Current Facility-Administered Medications:     onabotulinumtoxinA (Botox) injection 30 Units, 30 Units, intramuscular, Once, Ector Tristan MD    ALLERGIES  Allergies   Allergen Reactions    Adhesive Hives     All adhesives (including clonidine patches) caused blistering hives    Gamunex-C [Immune Globul G-Gly-Iga Avg 46] Anaphylaxis    Immune Globulin Hives and Shortness of breath    Immune Globulin,Gamma (Igg) Human Hives and Shortness of breath    Animal Dander Runny nose     Sneezing    Atorvastatin Myalgia    Codeine Nausea/vomiting    House Dust Runny nose     Sneezing    Ropinirole Hives    Tramadol Other     Nausea only    Ciprofloxacin Hives and Rash    Erythromycin Hives and " Rash       PAST MEDICAL HISTORY  Past Medical History:   Diagnosis Date    Incisional hernia without obstruction or gangrene 01/22/2019    Incisional hernia, without obstruction or gangrene    Noninfective gastroenteritis and colitis, unspecified 04/22/2019    Ileitis    Personal history of other diseases of the circulatory system 01/06/2021    History of hypertension    Personal history of other diseases of the digestive system 06/07/2018    History of umbilical hernia    Personal history of other diseases of the nervous system and sense organs     History of peripheral neuropathy    Thyrotoxicosis, unspecified without thyrotoxic crisis or storm 01/25/2014    Hyperthyroidism       PAST SURGICAL HISTORY  Past Surgical History:   Procedure Laterality Date    COLONOSCOPY  11/04/2013    Complete Colonoscopy    GALLBLADDER SURGERY  11/03/2016    Gallbladder Surgery    HERNIA REPAIR  12/13/2016    Inguinal Hernia Repair    HYSTERECTOMY  11/03/2016    Hysterectomy    THYROID SURGERY  11/03/2016    Thyroid Surgery    TONSILLECTOMY  11/03/2016    Tonsillectomy With Adenoidectomy    TOTAL KNEE ARTHROPLASTY  06/07/2020    Total Knee Arthroplasty    VENTRAL HERNIA REPAIR  09/17/2018    Ventral Hernia Repair       SOCIAL HISTORY   Social History     Socioeconomic History    Marital status: Significant Other     Spouse name: Not on file    Number of children: Not on file    Years of education: Not on file    Highest education level: Not on file   Occupational History    Not on file   Tobacco Use    Smoking status: Former     Types: Cigarettes    Smokeless tobacco: Never   Substance and Sexual Activity    Alcohol use: Never    Drug use: Never    Sexual activity: Not on file   Other Topics Concern    Not on file   Social History Narrative    Not on file     Social Determinants of Health     Financial Resource Strain: Not on file   Food Insecurity: Not on file   Transportation Needs: Not on file   Physical Activity: Not on file    Stress: Not on file   Social Connections: Not on file   Intimate Partner Violence: Not on file   Housing Stability: Not on file       FAMILY HISTORY  Family History   Problem Relation Name Age of Onset    Multiple sclerosis Mother      Coronary artery disease Father      Diabetes Father      Hypertension Father      Cystic fibrosis Sister          with gi manifestations    Cystic fibrosis Brother      Cystic fibrosis Son      Cystic fibrosis Other paternal relatives        REVIEW OF SYSTEMS  Except for those mentioned in the history of present illness, and below, a complete review of systems is negative.     Review of Systems    VITALS  There were no vitals filed for this visit.    PHYSICAL EXAMINATION   GENERAL:  Awake, alert, and oriented, no apparent distress, pleasant, and cooperative  PSYC: Mood is euthymic, affect is congruent  EAR, NOSE, THROAT:  Normocephalic, atraumatic, moist membranes, anicteric sclera  LUNG: Nonlabored breathing  HEART: No clubbing or cyanosis  SKIN: No increased erythema, warmth, rashes, or concerning skin lesions  NEURO: Sensation is intact in the bilateral lower extremities. Strength is grossly 5 out of 5 throughout the bilateral lower extremities, unless noted below.  GAIT: Non-antalgic  MUSCULOSKELETAL: Examination of the right hip: Hip range of motion was limited and painful with external rotation. Hip provocative maneuvers positive on right, producing right groin pain. Scour's was positive.  Natasha's was positive on right (groin). Hip thrust produces right groin pain. Ganeslen's and P4 are negative. No tenderness to palpation over the greater trochanteric region, ASIS, AIIS, adductor tendons, piriformis, ischial tuberosity. Lucy's was negative. Pain with lumbar flexion and extension. Lumbar compression positive on right. Negative SLR.    IMAGING STUDIES:   Radiographs lumbar spine dated 8/8/2023 were personally reviewed and interpreted by me, Dr. Radha Packer, and the  findings shared with the patient.  Advanced lumbar spine degenerative change at L4-L5 and L5-S1 with mild S shaped scoliosis of the thoracolumbar spine.    Radiographs of the bilateral hips dated 8/8/2023 were personally reviewed and interpreted by me, Dr. Radha Packer, and the findings shared with the patient.  Mild bilateral hip osteoarthritis with enthesophytes at the left greater trochanter.    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  Hip pain is likely due to acute exacerbation of chronic right hip osteoarthritis and her lumbar pain is likely related to lumbar facet arthrosis and myofascial pain.  -We discussed the above in detail.  -We discussed that the mainstay of treatment is physical therapy and she was given a referral today to focus on core strengthening and a directional preference program as well as hip girdle strengthening.  -We also discussed the pros, cons, risks, benefits, preprocedure postprocedure protocol and ultrasound-guided right hip joint corticosteroid injection.  She wished to proceed.  Please see procedure note section for complete details.  -We also discussed the importance of continued weight loss.  -If she wishes to pursue injections for her low back, we would need to refer her to medical spine.  At this time, she wants to start with a hip injection.  -Follow-up with me in 3 months, or sooner if needed.    The patient was counseled to remain active, but avoid activities that worsen symptoms. The patient was in agreement with this plan. All questions were answered to the best of my  ability.    PATIENT EDUCATION:  Education was discussed at today's appointment. A learning needs assessment was performed.    Primary learner: Lara Morris  Barriers to learning: None  Preferred language: English  Learning preferences include: Seeing and doing.  Discussed: Diagnosis and treatment plan.  Demonstrated: Understanding of material discussed.  Patient education materials given: None.  Learner response: Learner demonstrated understanding.    This note was dictated using Dragon speech recognition software and was not corrected for spelling or grammatical errors.    Patient seen and examined with PM&R resident, Dr. Clement. History, physical examination, pertinent imaging findings and the plan of care were discussed and I performed the key portions of the history, physical examination, and discussion of the plan of care. I have edited his note and agree with the findings.      Radha Packer MD    [facility] Asp/Inj: R hip joint on 5/21/2024 5:20 PM  Details: ultrasound-guided  Medications: 40 mg methylPREDNISolone acetate 40 mg/mL; 2 mL lidocaine PF 10 mg/mL (1 %); 3 mL ropivacaine 5 mg/mL (0.5 %)    Pre-Procedure Diagnosis: right hip pain, right hip osteoarthritis  Post-Procedure Diagnosis: right hip pain, right hip osteoarthritis    Procedure: US-guided right intraarticular hip corticosteroid injection    History of Present Illness: Lara Morris is a pleasant 64 y.o. female with hip pain secondary to hip arthritis who is here for the above procedure for improved pain control.      Medications and allergies were reviewed with the patient. No contraindications were identified.    Informed Consent  Following denial of allergy and review of potential side effects and complications including but not necessarily limited to infection, allergic reaction, local tissue breakdown, systemic effects of corticosteroids, elevation of blood glucose,  injury to soft tissue and/or nerves and seizure, the patient indicated understanding and agreed to proceed. Written consent to treatment was obtained and the patient verbalized consent for the procedure.    Procedural Details  The use of direct ultrasound visualization of the needle (rather than a non-guided injection) was required to increase patient safety by excluding inadvertent intramuscular or intratendinous placement and minimizing bleeding by avoiding osteochondral or vascular injury from the needle.  Additionally, the increased accuracy of placement may increase clinical effectiveness and will allow higher diagnostic specificity when evaluating effectiveness of this injection.    Using ultrasound, a pre-scan of the region was performed to identify the target structure.     The area was prepped with chlorhexidine, then re-examined using the same transducer, a sterile ultrasound transducer cover, and sterile ultrasound gel.    Procedural pause conducted to verify:  correct patient identity, procedure to be performed, and as applicable, correct side and site, correct patient position, and availability of implants, special equipment, or special requirements    Procedure:  Transducer: Curvilinear array transducer.  Patient position: Supine with the hip in a neutral position.  Localization process: With the transducer in an anatomic transverse oblique plane, the anterior hip joint was localized in a short axis view and the femoral head/neck junction was localized in a long axis view.   Local anesthesia: Local anesthesia was obtained with 1.5 cc of 1% lidocaine.  Needle: A 25-gauge, 3.5-inch needle was used for local anesthesia and for the injectate.  Approach: An inferolateral to superomedial, in plane, approach was used to guide the needle tip to the anterior joint recess at the head/neck junction. Os was contacted.   Injection/Aspiration: A mixture of 3 cc of 0.5% ropivocaine and 1 cc of DepoMedrol (40mg/mL)  was injected into the right hip joint without complication. Good flow was observed within the anterior joint recess.    Post-procedural care: The patient tolerated the procedure well and reported complete pain relief during the anesthetic phase. The patient was asked to ice for improved pain control and avoid submerging the area in water for the next 48 hours to help reduce the risk of infection. The patient was instructed to call the office immediately if there are any questions or concerns.      PATIENT EDUCATION:  Education of the diagnosis and treatment plan was discussed at today's appointment. A learning needs assessment was performed. The patient demonstrated understanding.

## 2024-05-22 ENCOUNTER — PHARMACY VISIT (OUTPATIENT)
Dept: PHARMACY | Facility: CLINIC | Age: 64
End: 2024-05-22
Payer: COMMERCIAL

## 2024-05-23 ENCOUNTER — APPOINTMENT (OUTPATIENT)
Dept: CARDIOLOGY | Facility: CLINIC | Age: 64
End: 2024-05-23
Payer: COMMERCIAL

## 2024-05-25 ENCOUNTER — PHARMACY VISIT (OUTPATIENT)
Dept: PHARMACY | Facility: CLINIC | Age: 64
End: 2024-05-25
Payer: COMMERCIAL

## 2024-05-29 PROBLEM — H02.839 DERMATOCHALASIS: Status: ACTIVE | Noted: 2024-05-29

## 2024-05-29 PROBLEM — E05.90 HYPERTHYROIDISM: Status: ACTIVE | Noted: 2024-05-29

## 2024-05-29 PROBLEM — I10 HYPERTENSION: Status: ACTIVE | Noted: 2024-05-29

## 2024-05-29 PROCEDURE — RXMED WILLOW AMBULATORY MEDICATION CHARGE

## 2024-05-30 ENCOUNTER — APPOINTMENT (OUTPATIENT)
Dept: CARDIOLOGY | Facility: CLINIC | Age: 64
End: 2024-05-30
Payer: COMMERCIAL

## 2024-05-30 ENCOUNTER — HOSPITAL ENCOUNTER (OUTPATIENT)
Dept: CARDIOLOGY | Facility: CLINIC | Age: 64
Discharge: HOME | End: 2024-05-30
Payer: COMMERCIAL

## 2024-05-30 ENCOUNTER — OFFICE VISIT (OUTPATIENT)
Dept: CARDIOLOGY | Facility: CLINIC | Age: 64
End: 2024-05-30
Payer: COMMERCIAL

## 2024-05-30 VITALS
BODY MASS INDEX: 36.83 KG/M2 | HEIGHT: 68 IN | RESPIRATION RATE: 18 BRPM | SYSTOLIC BLOOD PRESSURE: 115 MMHG | DIASTOLIC BLOOD PRESSURE: 79 MMHG | OXYGEN SATURATION: 99 % | WEIGHT: 243 LBS | HEART RATE: 76 BPM

## 2024-05-30 DIAGNOSIS — I35.0 NONRHEUMATIC AORTIC VALVE STENOSIS: ICD-10-CM

## 2024-05-30 DIAGNOSIS — E78.5 DYSLIPIDEMIA: ICD-10-CM

## 2024-05-30 DIAGNOSIS — I10 HYPERTENSION, UNSPECIFIED TYPE: ICD-10-CM

## 2024-05-30 DIAGNOSIS — I35.0 NONRHEUMATIC AORTIC VALVE STENOSIS: Primary | ICD-10-CM

## 2024-05-30 LAB
AORTIC VALVE MEAN GRADIENT: 13.1 MMHG
AORTIC VALVE PEAK VELOCITY: 2.74 M/S
AV PEAK GRADIENT: 30 MMHG
AVA (PEAK VEL): 1.64 CM2
AVA (VTI): 1.75 CM2
EJECTION FRACTION APICAL 4 CHAMBER: 59.7
LEFT ATRIUM VOLUME AREA LENGTH INDEX BSA: 19.1 ML/M2
LEFT VENTRICLE INTERNAL DIMENSION DIASTOLE: 4.9 CM (ref 3.5–6)
LEFT VENTRICULAR OUTFLOW TRACT DIAMETER: 2.17 CM
LV EJECTION FRACTION BIPLANE: 62 %
MITRAL VALVE E/A RATIO: 0.83
MITRAL VALVE E/E' RATIO: 8.84
RIGHT VENTRICLE FREE WALL PEAK S': 13 CM/S

## 2024-05-30 PROCEDURE — 93306 TTE W/DOPPLER COMPLETE: CPT | Performed by: INTERNAL MEDICINE

## 2024-05-30 PROCEDURE — 3044F HG A1C LEVEL LT 7.0%: CPT | Performed by: INTERNAL MEDICINE

## 2024-05-30 PROCEDURE — 93005 ELECTROCARDIOGRAM TRACING: CPT | Performed by: INTERNAL MEDICINE

## 2024-05-30 PROCEDURE — 99214 OFFICE O/P EST MOD 30 MIN: CPT | Performed by: INTERNAL MEDICINE

## 2024-05-30 PROCEDURE — 93010 ELECTROCARDIOGRAM REPORT: CPT | Performed by: INTERNAL MEDICINE

## 2024-05-30 PROCEDURE — 3008F BODY MASS INDEX DOCD: CPT | Performed by: INTERNAL MEDICINE

## 2024-05-30 PROCEDURE — 1160F RVW MEDS BY RX/DR IN RCRD: CPT | Performed by: INTERNAL MEDICINE

## 2024-05-30 PROCEDURE — 3078F DIAST BP <80 MM HG: CPT | Performed by: INTERNAL MEDICINE

## 2024-05-30 PROCEDURE — 1126F AMNT PAIN NOTED NONE PRSNT: CPT | Performed by: INTERNAL MEDICINE

## 2024-05-30 PROCEDURE — 3074F SYST BP LT 130 MM HG: CPT | Performed by: INTERNAL MEDICINE

## 2024-05-30 PROCEDURE — 3049F LDL-C 100-129 MG/DL: CPT | Performed by: INTERNAL MEDICINE

## 2024-05-30 PROCEDURE — 1159F MED LIST DOCD IN RCRD: CPT | Performed by: INTERNAL MEDICINE

## 2024-05-30 PROCEDURE — 93306 TTE W/DOPPLER COMPLETE: CPT

## 2024-05-30 PROCEDURE — 1036F TOBACCO NON-USER: CPT | Performed by: INTERNAL MEDICINE

## 2024-05-30 RX ORDER — EZETIMIBE 10 MG/1
10 TABLET ORAL DAILY
Qty: 90 TABLET | Refills: 3 | Status: SHIPPED | OUTPATIENT
Start: 2024-05-30

## 2024-05-30 ASSESSMENT — PAIN SCALES - GENERAL: PAINLEVEL: 0-NO PAIN

## 2024-05-30 NOTE — PROGRESS NOTES
Subjective   Lara Morris is a 64 y.o. female who presents to the [facility] for evaluation of mild aortic stenosis. Last seen in May 2023     She had repeat echocardiogram today shows mild aortic stenosis. Her exertional dyspnea is markedly improved s/p June 2023 diaphragm external pacemaker implantation by Dr. Torres and can now walk 1/2 mile at a time.     No active cardiac symptoms of chest pain, PND, orthopnea, KARTHIK, palpitations, syncope, or claudication.     50 lb weight loss on 1 year use of Mounjaro GLP-1 for type 2 diabetes. Now off insulin and metformin.     Past Medical History:  1. Aortic stenosis, mild  2. Dyslipidemia  3. CVID  4. 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 Type 2 diabetes   6. JOSÉ MIGUEL on CPAP     Social History:  Nonsmoker     Family History:  Father had CAD.     Review of Systems    A 14 point review of systems was asked. All questions were negative except for pertinent positives listed in the HPI.     Current Outpatient Medications on File Prior to Visit   Medication Sig Dispense Refill    acetaminophen (Tylenol) 325 mg tablet Take 2 tablets (650 mg) by mouth every 6 hours if needed for mild pain (1 - 3).      azelastine (Astelin) 137 mcg (0.1 %) nasal spray USE 1-2 SPRAYS IN EACH NOSTRIL ONCE DAILY TO TWO TIMES A DAY AS NEEDED FOR NASAL DRAINAGE 90 mL 3    blood sugar diagnostic (Accu-Chek Zakiya Plus test strp) strip Use one strip 3 times daily as directed 300 strip 3    calcium citrate (Calcitrate) 200 mg (950 mg) tablet Take 1 tablet (200 mg) by mouth once daily.      cetirizine (ZyrTEC) 10 mg tablet Take 1 tablet (10 mg) by mouth 2 times a day.      cholecalciferol (Vitamin D-3) 25 MCG (1000 UT) tablet Take 1 tablet (25 mcg) by mouth once daily.      cyanocobalamin (Vitamin B-12) 1,000 mcg/mL injection Inject 1 mL into the muscle every 3 weeks. 3 mL 4    ferrous fumarate-vitamin C (Romulo-Sequeles 65-25) Take 1 tablet by mouth 3 times  daily (morning, midday, late afternoon). Do not crush, chew, or split.      fluticasone (Flonase) 50 mcg/actuation nasal spray Administer 2 sprays into each nostril once daily.      fluticasone-umeclidin-vilanter (TRELEGY-ELLIPTA) 200-62.5-25 mcg blister with device INHALE 1 PUFF BY MOUTH ONCE DAILY 60 each 11    gabapentin (Neurontin) 100 mg capsule TAKE 1 CAPSULE BY MOUTH THREE TIMES A  capsule 2    gabapentin (Neurontin) 300 mg capsule Take 2 capsules (600 mg) by mouth once daily at bedtime. 60 capsule 3    ginseng 100 mg capsule Take 100 mg by mouth once daily.      glucosamine-chondroitin 500-400 mg tablet Take 1 tablet by mouth 3 times a day.      Hizentra subcutaneous infusion Inject under the skin 1 (one) time per week.      ibuprofen 800 mg tablet Take 1 tablet (800 mg) by mouth 2 times a day as needed for moderate pain (4 - 6). 180 tablet 1    ipratropium-albuteroL (Duo-Neb) 0.5-2.5 mg/3 mL nebulizer solution Take 3 mL by nebulization 4 times a day as needed for shortness of breath or wheezing.      levothyroxine (Synthroid, Levoxyl) 150 mcg tablet TAKE 1 TABLET BY MOUTH ONCE DAILY 90 tablet 3    lidocaine-prilocaine (Emla) 2.5-2.5 % cream Apply 1 application once a day as needed 60 g 6    mepolizumab (Nucala) 100 mg/mL auto-injector Inject 100 mg (1 pen) under the skin every 4 weeks as directed 1 mL 12    montelukast (Singulair) 10 mg tablet TAKE 1 TABLET BY MOUTH EVERY DAY 90 tablet 3    MULTIVITAMIN ORAL Take 1 tablet by mouth once daily.      omega 3-dha-epa-fish oil (Fish OiL) 1,200 (144-216) mg capsule Take 1 capsule (1,200 mg) by mouth once daily.      omeprazole (PriLOSEC) 40 mg DR capsule Take 1 capsule (40 mg) by mouth once daily in the morning. Take before meals. Do not crush or chew. 90 capsule 3    potassium &magnesium aspartate (magnesium, potassium aspartate) 250-250 mg capsule Take 2 capsules by mouth once daily.      RESVERATROL ORAL Take 1,450 mg by mouth once daily.       "tirzepatide (Mounjaro) 10 mg/0.5 mL pen injector Inject 10 mg under the skin 1 (one) time per week. 2 mL 11    turmeric 400 mg capsule Take by mouth.      [DISCONTINUED] ezetimibe (Zetia) 10 mg tablet Take 1 tablet (10 mg) by mouth once daily.      acyclovir-hydrocortisone 5-1 % cream Xerese 5-1 % External Cream Quantity: 5 Refills: 0 DO Start : 22-Mar-2018 Active      EPINEPHrine (Epipen) 0.3 mg/0.3 mL injection syringe USE AS NEEDED AS DIRECTED 10 each 6    esomeprazole (NexIUM) 40 mg DR capsule TAKE 1 CAPSULE BY MOUTH EVERY MORNING BEFORE MEALS 90 capsule 3    insulin aspart (NovoLOG) 100 unit/mL (3 mL) pen INJECT UP TO 18 UNITS UNDER THE SKIN THREE TIMES A DAY BEFORE MEALS (6 UNITS FOR BLOOD GLUCOSE -180, 8 UNITS -220, 10 UNITS -260, 12 UNITS -300, 14 UNITS -350, 16 UNITS -400, 18 UNITS  OR HIGHER). 15 mL 3    montelukast (Singulair) 10 mg tablet TAKE 1 TABLET BY MOUTH ONCE DAILY 90 tablet 5    onabotulinumtoxinA (Botox) 100 unit injection PHYSICIAN TO INJECT UP  UNITS INTRAMUSCULARLY EVERY 90 DAYS. FOR OFFICE USE ONLY 1 each 3    pen needle, diabetic (BD Cici 2nd Gen Pen Needle) 32 gauge x 5/32\" needle Use 1 pen needle to inject insulin under the skin 3 times a day before meals 300 each 3    rizatriptan (Maxalt) 10 mg tablet TAKE 1 TABLET BY MOUTH AT BEGINNING OF MIGRAINE. TAKE 1 BY MOUTH EVERY 2 HOURS IF MIGRAINE NOT RESOLVED. MAXIMUM OF 3 TABLET BY MOUTH IN 24 HOURS 45 tablet 11    Ventolin HFA 90 mcg/actuation inhaler INHALE 2 PUFFS BY MOUTH EVERY 4 HOURS AS NEEDED (Patient taking differently: Inhale 2 puffs every 4 hours if needed for wheezing.) 54 g 11    [DISCONTINUED] amoxicillin-potassium clavulanate (Augmentin XR) 1,000-62.5 mg 12 hr tablet Take 2 tablets (2,000 mg of amoxicillin) by mouth every 12 hours. (Patient not taking: Reported on 4/24/2024) 56 tablet 2    [DISCONTINUED] clobetasol (Temovate) 0.05 % ointment Apply 1 application 2 times daily " "for 2 weeks and then as needed for 2 weeks. (Patient not taking: Reported on 4/24/2024) 45 g 0    [DISCONTINUED] metFORMIN (Glucophage) 1,000 mg tablet Take 1 tablet (1,000 mg) by mouth once daily with breakfast. (Patient not taking: Reported on 5/30/2024) 30 tablet 11    [DISCONTINUED] needle, disp, 25 gauge (Monoject Hypodermic Needles) 25 gauge x 5/8\" needle Use as directed for injections, 2 every four weeks 2 each 11    [DISCONTINUED] needle, disp, 25 gauge (Monoject Hypodermic Needles) 25 gauge x 5/8\" needle Use for 2 injections every 4 weeks 2 each 11    [DISCONTINUED] syringe, disposable, (Monoject Tuberculin Syringe) 1 mL syringe Use 2 syringes to inject every month as directed 2 each 11    [DISCONTINUED] syringe, disposable, (Monoject Tuberculin Syringe) 1 mL syringe Use 2 syringes every month 2 each 11    [DISCONTINUED] traZODone (Desyrel) 100 mg tablet Take by mouth.       Current Facility-Administered Medications on File Prior to Visit   Medication Dose Route Frequency Provider Last Rate Last Admin    onabotulinumtoxinA (Botox) injection 30 Units  30 Units intramuscular Once Ector Tristan MD          Objective   Physical Exam  BP Readings from Last 3 Encounters:   05/30/24 115/79   05/10/24 124/80   04/24/24 113/63      Wt Readings from Last 3 Encounters:   05/30/24 110 kg (243 lb)   05/10/24 111 kg (245 lb)   04/24/24 112 kg (247 lb 3.2 oz)      BMI: Estimated body mass index is 36.95 kg/m² as calculated from the following:    Height as of this encounter: 1.727 m (5' 8\").    Weight as of this encounter: 110 kg (243 lb).  BSA: Estimated body surface area is 2.3 meters squared as calculated from the following:    Height as of this encounter: 1.727 m (5' 8\").    Weight as of this encounter: 110 kg (243 lb).    General: no acute distress  HEENT: EOMI, no scleral icterus.  Lungs: Clear to auscultation bilaterally without wheezing, rales, or rhonchi.  Cardiovascular: Regular rhythm and rate. Normal S1 and " "S2. No murmurs, rubs, or gallops are appreciated. JVP normal.  Cardiovascular: 2/6 systolic ejection murmur is heard at 2nd right intercostal space.  Abdomen: Soft, nontender, nondistended. Bowel sounds present.  Extremities: Warm and well perfused with equal 2+ pulses bilaterally.  No edema present.  Neurologic: Alert and oriented x3.    I have personally reviewed the following images and laboratory findings:  Last echocardiogram:  2024 echo: LV EF 60-65%, no LVH (LVMI 69 gm/m2), impaired relaxation diastology (E/e' 9), normal LA size (ANNEMARIE 20 ml/m2), normal RV/RA, mild aortic stenosis (peak velocity 2.74 m/sec, peak/mean 30/13 mm Hg, DI 0.47, LEIDA 1.6 cm2), trivial AI, trace MR, trace TR, unable to estimate RVSP.    Last cath / stress test:  None    Most recent EC2023 ECG: Sinus rhythm, 107 bpm, otherwise normal ECG. Personally reviewed in office.     Lab Results   Component Value Date    CHOL 180 04/10/2024    CHOL 239 (H) 2020    CHOL 246 (H) 2020     Lab Results   Component Value Date    HDL 55.7 04/10/2024    HDL 40.3 2020    HDL 37.2 (A) 2020     Lab Results   Component Value Date    LDLCALC 109 (H) 04/10/2024     Lab Results   Component Value Date    TRIG 77 04/10/2024    TRIG 207 (H) 2020    TRIG 313 (H) 2020     No components found for: \"CHOLHDL\"      Assessment/Plan   1. Aortic stenosis:  2023 echocardiogram demonstrated mild aortic stenosis. Mild by DI (0.51) and LEIDA (1.7 cm2) and near moderate by peak velocity (3.09 m/sec) due to elevated LVOT velocity (1.45 m/sec) resulting in peak/mean gradient 38/20 mm Hg. Murmur is mild in category. Repeat echocardiogram today with unchanged hemodynamic profile. Will repeat an echocardiogram in 1 year.     2. Dyslipidemia:  Continue ezetimibe 10 mg a day. Had muscle side effects on statin medications per prior chart notes. We restarted ezetimibe in May 2023 with improvement in LDL to 109 on 2024 lipids from LDL " > 150 along with taking Mounjaro GLP-1. Close to goal LDL < 100.     Follow up with Dr. Craft in May 2025 with repeat echo.            SIGNATURE: Eric Craft MD PATIENT NAME: Lara Morris   DATE/TIME: May 30, 2024 5:31 PM MRN: [id number]

## 2024-05-31 ENCOUNTER — PHARMACY VISIT (OUTPATIENT)
Dept: PHARMACY | Facility: CLINIC | Age: 64
End: 2024-05-31
Payer: COMMERCIAL

## 2024-05-31 LAB
ATRIAL RATE: 69 BPM
P AXIS: 56 DEGREES
P OFFSET: 162 MS
P ONSET: 98 MS
PR INTERVAL: 246 MS
Q ONSET: 221 MS
QRS COUNT: 12 BEATS
QRS DURATION: 96 MS
QT INTERVAL: 400 MS
QTC CALCULATION(BAZETT): 428 MS
QTC FREDERICIA: 419 MS
R AXIS: 30 DEGREES
T AXIS: 63 DEGREES
T OFFSET: 421 MS
VENTRICULAR RATE: 69 BPM

## 2024-06-03 ENCOUNTER — PROCEDURE VISIT (OUTPATIENT)
Dept: NEUROLOGY | Facility: CLINIC | Age: 64
End: 2024-06-03
Payer: COMMERCIAL

## 2024-06-03 VITALS — BODY MASS INDEX: 36.64 KG/M2 | WEIGHT: 241 LBS

## 2024-06-03 DIAGNOSIS — G51.32 CLONIC HEMIFACIAL SPASM OF MUSCLE OF LEFT SIDE OF FACE: Primary | ICD-10-CM

## 2024-06-03 PROCEDURE — 64612 DESTROY NERVE FACE MUSCLE: CPT | Performed by: PSYCHIATRY & NEUROLOGY

## 2024-06-03 NOTE — PROGRESS NOTES
Neurology Botulinum Injection    Subjective   Lara Morris is an 64 y.o. female here for medical botulinum injection for Clonic hemifacial spasm of muscle of left side of face [G51.32].   Lara Morris is an 63 y.o. female here for medical botulinum injection for Hemifacial spasm of left side of face [G51.32]. 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 Partially worn off.had black eye last time        Interval PMH: diaphragmatic pacemaker placed     PMHx   CVID , taking IVIG for 12 years   Asthma  osteoarthritis   B12 deficiency   bilateral hearing impairment   wears CPAP      PSHx  Bilateral knee replacement    take alcohol      Objective   Neurological Exam  Physical Exam  ProceduresroceduresPrior to the start of the procedure a time out was taken and the following were verified: the identity of the patient using two patient identifiers (name, ) - this was verified by the patient      The area was prepped in the usual sterile fashion. Botulinum toxin type A (Botox)was injected as follows:   injected by Dr Tristan  Dose Sites Muscle  5 units 1 left superomedial orbicularis ocularis   5 units 1 left superolateral orbicularis ocularis   5 units 1 left lateral orbicularis ocularis   5 units 1 left Inferior lateral orbicularis ocularis   5 units 1 left Inferior orbicularis ocularis   3 units 1 left zygomaticus   1 unit 1 left risorius  1 unit 1 left upper orbicularis oris    10 Unit        Lt platysma        Patient supplied Botox.  Total botulinum toxin used was 40 units, 60 discarded, patient supplied 100 units     Assessment/Plan hemifacial spasm

## 2024-06-06 ENCOUNTER — HOME INFUSION (OUTPATIENT)
Dept: INFUSION THERAPY | Age: 64
End: 2024-06-06
Payer: COMMERCIAL

## 2024-06-06 DIAGNOSIS — D83.9 COMMON VARIABLE IMMUNODEFICIENCY (MULTI): Primary | ICD-10-CM

## 2024-06-06 DIAGNOSIS — D83.9 COMMON VARIABLE IMMUNODEFICIENCY (MULTI): ICD-10-CM

## 2024-06-06 RX ORDER — HUMAN IMMUNOGLOBULIN G 0.2 G/ML
23 LIQUID SUBCUTANEOUS
Qty: 460 ML | Refills: 6 | Status: SHIPPED | OUTPATIENT
Start: 2024-06-09 | End: 2024-06-06

## 2024-06-06 NOTE — PROGRESS NOTES
Review of chart - pt with order for Hizentra 24gm weekly. New order rec'd 6/5 for 6 total fills thru 6/4/25.  This is fill 1/6 on current order  Auth good thru 7/24/24 June FOTM verified.   Pt has been set up with copay assistance program.     Pt contacted, will need doses for pickup 6/10     Pharmacy to dispense the following 6/7 for pt pickup 6/10:  8x Hizentra 10gm/50mL vials  4x Hizentra 4gm/20mL vials  For DOS: 6/11 - 7/8     Pt to  from pharmacy - do not deliver to pt     FU 7/3 check next delivery date, check inventory and needs, check FOTM. Next dose around 7/9  New auth needed before 7/24

## 2024-06-10 ENCOUNTER — APPOINTMENT (OUTPATIENT)
Dept: RHEUMATOLOGY | Facility: CLINIC | Age: 64
End: 2024-06-10
Payer: COMMERCIAL

## 2024-06-10 PROCEDURE — RXMED WILLOW AMBULATORY MEDICATION CHARGE

## 2024-06-11 ENCOUNTER — PHARMACY VISIT (OUTPATIENT)
Dept: PHARMACY | Facility: CLINIC | Age: 64
End: 2024-06-11
Payer: COMMERCIAL

## 2024-06-14 ENCOUNTER — PHARMACY VISIT (OUTPATIENT)
Dept: PHARMACY | Facility: CLINIC | Age: 64
End: 2024-06-14
Payer: COMMERCIAL

## 2024-06-14 PROCEDURE — RXMED WILLOW AMBULATORY MEDICATION CHARGE

## 2024-06-14 RX ORDER — ALBUTEROL SULFATE 90 UG/1
2 AEROSOL, METERED RESPIRATORY (INHALATION) EVERY 4 HOURS PRN
Qty: 54 G | Refills: 11 | Status: CANCELLED | OUTPATIENT
Start: 2024-06-14 | End: 2025-06-13

## 2024-06-14 RX ORDER — ALBUTEROL SULFATE 90 UG/1
AEROSOL, METERED RESPIRATORY (INHALATION)
Qty: 54 G | Refills: 6 | OUTPATIENT
Start: 2024-06-14

## 2024-06-18 ENCOUNTER — SPECIALTY PHARMACY (OUTPATIENT)
Dept: PHARMACY | Facility: CLINIC | Age: 64
End: 2024-06-18

## 2024-06-18 PROCEDURE — RXMED WILLOW AMBULATORY MEDICATION CHARGE

## 2024-06-21 ENCOUNTER — PHARMACY VISIT (OUTPATIENT)
Dept: PHARMACY | Facility: CLINIC | Age: 64
End: 2024-06-21
Payer: COMMERCIAL

## 2024-06-24 PROCEDURE — RXMED WILLOW AMBULATORY MEDICATION CHARGE

## 2024-06-26 ENCOUNTER — PHARMACY VISIT (OUTPATIENT)
Dept: PHARMACY | Facility: CLINIC | Age: 64
End: 2024-06-26
Payer: COMMERCIAL

## 2024-06-26 ENCOUNTER — APPOINTMENT (OUTPATIENT)
Dept: PRIMARY CARE | Facility: CLINIC | Age: 64
End: 2024-06-26
Payer: COMMERCIAL

## 2024-06-26 ENCOUNTER — EVALUATION (OUTPATIENT)
Dept: PHYSICAL THERAPY | Facility: CLINIC | Age: 64
End: 2024-06-26
Payer: COMMERCIAL

## 2024-06-26 DIAGNOSIS — M47.816 ARTHRITIS, LUMBAR SPINE: ICD-10-CM

## 2024-06-26 DIAGNOSIS — M16.11 OSTEOARTHRITIS OF RIGHT HIP, UNSPECIFIED OSTEOARTHRITIS TYPE: ICD-10-CM

## 2024-06-26 PROCEDURE — RXMED WILLOW AMBULATORY MEDICATION CHARGE

## 2024-06-26 PROCEDURE — 97110 THERAPEUTIC EXERCISES: CPT | Mod: GP | Performed by: PHYSICAL THERAPIST

## 2024-06-26 PROCEDURE — 97161 PT EVAL LOW COMPLEX 20 MIN: CPT | Mod: GP | Performed by: PHYSICAL THERAPIST

## 2024-06-26 ASSESSMENT — ENCOUNTER SYMPTOMS
DEPRESSION: 0
OCCASIONAL FEELINGS OF UNSTEADINESS: 0
LOSS OF SENSATION IN FEET: 0

## 2024-06-26 NOTE — PROGRESS NOTES
Physical Therapy    Physical Therapy Evaluation and Treatment      Patient Name: Lara Morris  MRN: [id number]  Today's Date: 6/26/2024  Visit: 1  Insurance: Reviewed  Physician: Samina Packer  PT Evaluation Time Entry  PT Evaluation (Low) Time Entry: 30  PT Therapeutic Procedures Time Entry  Therapeutic Exercise Time Entry: 10   Time Calculation  Start Time: 0515  Stop Time: 0555  Time Calculation (min): 40 min    Assessment: Patient seen in PT for Initial Evaluation for hip pain.   Patient presents with postural deviation  decreased hip ROM , decreased hip strength, ant groin/right sciatic notch tenderness, positive cheng.  Functionally, patient  unable to walk long distances, do strenuous ADL's.  Patient rates LEFS at 51.25%.    PT Assessment  Rehab Prognosis: Good  Evaluation/Treatment Tolerance: Patient tolerated treatment well     Plan:  Continue with POC  SciFit stepper, hip ROM/strength, manual hip ROM, PRE's, CKC, HP, posture, back stretches, core strength   OP PT Plan  PT Plan: Skilled PT  PT Frequency: 1 time per week  Duration: 6  Onset Date: 05/21/24  Rehab Potential: Good  Plan of Care Agreement: Patient    Current Problem:   1. Osteoarthritis of right hip, unspecified osteoarthritis type  Referral to Physical Therapy    Follow Up In Physical Therapy      2. Arthritis, lumbar spine  Referral to Physical Therapy    Follow Up In Physical Therapy          Subjective    General: Patient with a history of right hip pain for one year.  Onset was insidious.  Patient treated with cortisone shot in 4/24  Much better since then.  Taking tyelonol with arthritis.  Patient complains of pain in the region of the right PSIS/groin, ache > sharp pain, 3-4/10 at worst last 7 days.  Patient's pain is worse with step up onto a large step.  Functionally, patient is unable to do strenuous ADL's, long walks.  History of bilateral TKA.  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         Precautions: DM,  asthma  Precautions  STEADI Fall Risk Score (The score of 4 or more indicates an increased risk of falling): 1  Prior Level of Function: able to walk longer distances       Objective     Postural deviation: out toeing right, increased valgus right, lateral shift to right  Lumbar ROM 40 flex, 10 ext, 10 right SB, and 0 left SB  right  hip ROM to 100 flexion, 0 extension, 40 ER, 10 IR  Right Hip strength to 3+/5 flexion, 4/5 extension, 4/5 add, 4/5 abd  Gait deviation: trunk list to right with right stance  Tender to palpation at the right groin and right sciatic notch  Balance: 2-3 SLS on right  Special Tests: positive DAMIAN on right     Outcome Measures:  Other Measures  Lower Extremity Funtional Score (LEFS): 51.25%     Treatments:  Patient instructed in HEP including: ball squeezes, hip abd/er with green theraband, SAQ, quad set, HLR and KTC with strap 10-20 times each (10 minutes)      EDUCATION: HEP       Goals:  Active       PT Problem       PT Goal 1       Start:  06/26/24    Expected End:  09/24/24       Hip pain 2/10 or less         PT Goal 2       Start:  06/26/24    Expected End:  09/24/24       Improve LEFS by 20%         PT Goal 3       Start:  06/26/24    Expected End:  09/24/24       Maximize right hip and lumbar ROM         PT Goal 4       Start:  06/26/24    Expected End:  09/24/24       Right hip strength 5/5

## 2024-06-27 ENCOUNTER — PHARMACY VISIT (OUTPATIENT)
Dept: PHARMACY | Facility: CLINIC | Age: 64
End: 2024-06-27
Payer: COMMERCIAL

## 2024-07-05 ENCOUNTER — HOME INFUSION (OUTPATIENT)
Dept: INFUSION THERAPY | Age: 64
End: 2024-07-05
Payer: COMMERCIAL

## 2024-07-05 NOTE — PROGRESS NOTES
Review of chart - pt with order for Hizentra 24gm weekly. New order valid for 6 total fills thru 6/4/25.  This is fill 2/6 on current order  Auth good thru 7/24/24 July FOTM verified.   Pt has been set up with copay assistance program.     Pt contacted, requesting delivery to her home on 7/9     Pharmacy to dispense the following 7/8 for home delivery 7/9:  8x Hizentra 10gm/50mL vials  4x Hizentra 4gm/20mL vials  1x EMLA  For DOS: 7/10 - 8/6    Next delivery due ~8/7     FU 7/23 check next delivery date, check on new auth, check supply needs with patient. Placed on August priority FOTM list

## 2024-07-08 ENCOUNTER — APPOINTMENT (OUTPATIENT)
Dept: PRIMARY CARE | Facility: CLINIC | Age: 64
End: 2024-07-08
Payer: COMMERCIAL

## 2024-07-09 ENCOUNTER — DOCUMENTATION (OUTPATIENT)
Dept: INFUSION THERAPY | Age: 64
End: 2024-07-09
Payer: COMMERCIAL

## 2024-07-09 NOTE — PROGRESS NOTES
Spoke with patientvia email . Delivery will be tonight tonight by 8pm with the  to call ahead of time. Sending to home address this delivery.     Sending drug, standard supplies for 4 dose of Hizentra qweekly delivered subcutaneously  via [facility] Pump + Emla. Sending both 6mm and 9mm needle sets as usual.     Patient had no questions for pharmacist.

## 2024-07-15 DIAGNOSIS — E06.3 HYPOTHYROIDISM DUE TO HASHIMOTO'S THYROIDITIS: Primary | ICD-10-CM

## 2024-07-15 DIAGNOSIS — E03.8 HYPOTHYROIDISM DUE TO HASHIMOTO'S THYROIDITIS: Primary | ICD-10-CM

## 2024-07-15 PROCEDURE — RXMED WILLOW AMBULATORY MEDICATION CHARGE

## 2024-07-15 RX ORDER — LEVOTHYROXINE SODIUM 150 UG/1
150 TABLET ORAL DAILY
Qty: 90 TABLET | Refills: 3 | Status: SHIPPED | OUTPATIENT
Start: 2024-07-15 | End: 2025-07-15

## 2024-07-16 ENCOUNTER — PHARMACY VISIT (OUTPATIENT)
Dept: PHARMACY | Facility: CLINIC | Age: 64
End: 2024-07-16
Payer: COMMERCIAL

## 2024-07-16 ENCOUNTER — SPECIALTY PHARMACY (OUTPATIENT)
Dept: PHARMACY | Facility: CLINIC | Age: 64
End: 2024-07-16

## 2024-07-16 PROCEDURE — RXMED WILLOW AMBULATORY MEDICATION CHARGE

## 2024-07-16 RX ORDER — OLOPATADINE HYDROCHLORIDE 7 MG/ML
SOLUTION OPHTHALMIC
Qty: 5 ML | Refills: 5 | OUTPATIENT
Start: 2024-07-16

## 2024-07-17 PROCEDURE — RXMED WILLOW AMBULATORY MEDICATION CHARGE

## 2024-07-18 ENCOUNTER — PHARMACY VISIT (OUTPATIENT)
Dept: PHARMACY | Facility: CLINIC | Age: 64
End: 2024-07-18
Payer: COMMERCIAL

## 2024-07-19 PROCEDURE — RXMED WILLOW AMBULATORY MEDICATION CHARGE

## 2024-07-20 ENCOUNTER — PHARMACY VISIT (OUTPATIENT)
Dept: PHARMACY | Facility: CLINIC | Age: 64
End: 2024-07-20
Payer: COMMERCIAL

## 2024-07-23 ENCOUNTER — HOME INFUSION (OUTPATIENT)
Dept: INFUSION THERAPY | Age: 64
End: 2024-07-23
Payer: COMMERCIAL

## 2024-07-23 NOTE — PROGRESS NOTES
Review of chart - pt with order for Hizentra 24gm weekly. New order valid for 6 total fills thru 6/4/25.  This are 4 open fills on current order  Auth good thru 7/2025 July FOTM verified. Placed on August priority FOTM list  Pt has been set up with copay assistance program.       Next delivery due by 8/6 for 08/07 infusion      FU 7/29 check next delivery date with patient. Delivery to home vs patient .

## 2024-07-25 ENCOUNTER — APPOINTMENT (OUTPATIENT)
Dept: PRIMARY CARE | Facility: CLINIC | Age: 64
End: 2024-07-25
Payer: COMMERCIAL

## 2024-07-25 VITALS
DIASTOLIC BLOOD PRESSURE: 77 MMHG | BODY MASS INDEX: 35.61 KG/M2 | WEIGHT: 235 LBS | SYSTOLIC BLOOD PRESSURE: 124 MMHG | HEIGHT: 68 IN | HEART RATE: 70 BPM | OXYGEN SATURATION: 96 %

## 2024-07-25 DIAGNOSIS — E66.01 CLASS 2 SEVERE OBESITY DUE TO EXCESS CALORIES WITH SERIOUS COMORBIDITY AND BODY MASS INDEX (BMI) OF 38.0 TO 38.9 IN ADULT (MULTI): ICD-10-CM

## 2024-07-25 DIAGNOSIS — E11.9 DIABETES MELLITUS TYPE 2 WITHOUT RETINOPATHY (MULTI): Primary | ICD-10-CM

## 2024-07-25 PROCEDURE — 1036F TOBACCO NON-USER: CPT | Performed by: INTERNAL MEDICINE

## 2024-07-25 PROCEDURE — 3078F DIAST BP <80 MM HG: CPT | Performed by: INTERNAL MEDICINE

## 2024-07-25 PROCEDURE — 3008F BODY MASS INDEX DOCD: CPT | Performed by: INTERNAL MEDICINE

## 2024-07-25 PROCEDURE — 3044F HG A1C LEVEL LT 7.0%: CPT | Performed by: INTERNAL MEDICINE

## 2024-07-25 PROCEDURE — 3049F LDL-C 100-129 MG/DL: CPT | Performed by: INTERNAL MEDICINE

## 2024-07-25 PROCEDURE — 99214 OFFICE O/P EST MOD 30 MIN: CPT | Performed by: INTERNAL MEDICINE

## 2024-07-25 PROCEDURE — 3074F SYST BP LT 130 MM HG: CPT | Performed by: INTERNAL MEDICINE

## 2024-07-25 RX ORDER — TIRZEPATIDE 12.5 MG/.5ML
12.5 INJECTION, SOLUTION SUBCUTANEOUS
Qty: 2 ML | Refills: 2 | Status: SHIPPED | OUTPATIENT
Start: 2024-07-28 | End: 2024-10-20

## 2024-07-25 NOTE — PROGRESS NOTES
"Subjective   Lara Morris is a 64 y.o. female who presents for Follow-up (Lara is here today for routine 3-6 month F/U. ).  HPI    Past medical history includes CVID, obesity, hypothyroidism, asthma, hypertension, osteoarthritis and diabetes.    Interim:  - continues weekly IVIG infusions and Botox injections  - will be seeing allergy in 3 weeks    Initial A1c > 9%, now most recently 5.3% (4/2024), off Metformin since last visit and has not needed to use SSI (prescribed for when patient needs short steroid courses). Has not needed steroids since 1/2024.   On Mounjaro since May 2023 at dose 10 mg weekly since last visit in 3/2024, no side effects.  Has really helped with controlling hunger.  She doesn't eat during the day but eats dinner when comes home at end of day. Typical meal is a protein with vegetables with a cottage cheese and fruit snack. Able to limit carbs more readily on the injectable.     She rides her ebike for activity. Is busy out boating, biking, and using her RV in the summer.      Providers:  Immunology-Dr. Emilie Richey  Surgery - Dr. Torres  Rheumatology-Dr. Moreno- Dr. Tiffany Love (no longer with )   Pulmonary: Dr. Gorge Wilkinson  Ophthalmology: Dr. Hanson  Neurology: Dr. Richie Tristan  Sleep: Dr. Wong    Objective   /77 (BP Location: Left arm)   Pulse 70   Ht 1.727 m (5' 8\")   Wt 107 kg (235 lb)   LMP  (LMP Unknown)   SpO2 96%   BMI 35.73 kg/m²    Physical Exam  Constitutional:       Appearance: Normal appearance. She is not ill-appearing.   HENT:      Nose: Nose normal.      Mouth/Throat:      Mouth: Mucous membranes are moist.      Pharynx: Oropharynx is clear. No oropharyngeal exudate or posterior oropharyngeal erythema.   Eyes:      General: No scleral icterus.     Extraocular Movements: Extraocular movements intact.      Conjunctiva/sclera: Conjunctivae normal.      Pupils: Pupils are equal, round, and reactive to light.   Cardiovascular:      Rate and " Rhythm: Normal rate and regular rhythm.      Pulses: Normal pulses.      Heart sounds: Murmur heard.   Pulmonary:      Effort: Pulmonary effort is normal.      Breath sounds: Normal breath sounds. No wheezing.   Abdominal:      General: Abdomen is flat. Bowel sounds are normal.      Palpations: Abdomen is soft.   Musculoskeletal:      Cervical back: Normal range of motion and neck supple.   Lymphadenopathy:      Cervical: No cervical adenopathy.   Neurological:      Mental Status: She is alert.         Assessment/Plan   1. Diabetes mellitus type 2 without retinopathy (Multi)  tirzepatide (Mounjaro) 12.5 mg/0.5 mL pen injector      2. Class 2 severe obesity due to excess calories with serious comorbidity and body mass index (BMI) of 38.0 to 38.9 in adult (Multi)  tirzepatide (Mounjaro) 12.5 mg/0.5 mL pen injector         Patient here for 3 month follow up to discuss increasing monjouro dose. She is currently at 10 mg, will increase to 12.5 mg given patient is tolerating medication very well with no side effects and having good results with it. Weight continues to decrease, current weight today is 235 lbs.     Metabolic syndrome: NIDDM/HTN/HLD/Obesity, A1c 5.3%  - Metformin discontinued since last visit, continue on GLP-1 RA monotherapy  -Increase Mounjaro to 12.5 mg weekly (initial weight ~300lb (5/2023) --> 243lb (3/20/2024), now down to 235 lbs today   -Sliding-scale insulin for glucose control with use of steroids if needed, has not needed  -Statin not tolerated even at very low dose (rosuva 5mg QOD); continue Zetia  -Blood pressure is controlled off lisinopril, even small dose causing symptoms of hypotension  -Recommend annual podiatry and eye exam    Hypothyroidism:   -Continues on levothyroxine, 150mcg daily  -Normal TSH 4/10/24    ---Copied forward for reference---    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 Torres/team  - CTPE negative  - PFTs normal May  2023  - mild aortic stenosis and diastolic dysfunction, June 2023; repeat TTE in one year    CVID and asthma: managed by Dr. Emilie Richey, on Hizentra    JOSÉ MIGUEL/RLS: Moderate with hypoxia on HSAT 11/1/2023  - on BiPAP, 8.5/4, tolerating better than CPAP, using every night and benefiting with energy and sleep quality, continue;   - iron supplementation and gabapentin 600mg at bedtime with improvement  - follow-up with sleep med, Dr. Wong         Health maintenance  -Mammogram: 3/20/2024, normal  -Pap: History of hysterectomy, unclear if indicated  -DEXA: If not done should check baseline given steroid use  -Last colonoscopy: 04/22/2019 , tubular adenoma Repeat: 3 years (poor prep) ordered and scheduled for Oct per patient   -Smoking history: Never  -Counseled regarding diet and exercise  -Immunizations: Per Dr. Emilie Richey  -Followup in 3-6 months, sooner if needed         Aggie Gallo MD

## 2024-07-30 PROCEDURE — RXMED WILLOW AMBULATORY MEDICATION CHARGE

## 2024-07-31 ENCOUNTER — APPOINTMENT (OUTPATIENT)
Dept: PHYSICAL THERAPY | Facility: CLINIC | Age: 64
End: 2024-07-31
Payer: COMMERCIAL

## 2024-08-01 ENCOUNTER — PHARMACY VISIT (OUTPATIENT)
Dept: PHARMACY | Facility: CLINIC | Age: 64
End: 2024-08-01
Payer: COMMERCIAL

## 2024-08-05 ENCOUNTER — HOME INFUSION (OUTPATIENT)
Dept: INFUSION THERAPY | Age: 64
End: 2024-08-05
Payer: COMMERCIAL

## 2024-08-05 NOTE — PROGRESS NOTES
Review of chart - pt with order for Hizentra 24gm weekly. New order valid for 6 total fills thru 6/4/25.  This is fill 3/6 on current order  Auth good thru 7/2025 July FOTM verified.   Pt has been set up with copay assistance program.     Pt contacted, requesting to  medication 8/7     Pharmacy to dispense the following 8/6 for  8/7:  8x Hizentra 10gm/50mL vials  4x Hizentra 4gm/20mL vials  For DOS: 8/7 - 9/3     Next delivery due ~9/4     FU 8/28 check next delivery date, check supply needs with patient. Placed on September priority FOTM list

## 2024-08-06 ENCOUNTER — DOCUMENTATION (OUTPATIENT)
Dept: INFUSION THERAPY | Age: 64
End: 2024-08-06

## 2024-08-06 ENCOUNTER — APPOINTMENT (OUTPATIENT)
Dept: RHEUMATOLOGY | Facility: CLINIC | Age: 64
End: 2024-08-06
Payer: COMMERCIAL

## 2024-08-06 VITALS
OXYGEN SATURATION: 95 % | SYSTOLIC BLOOD PRESSURE: 129 MMHG | HEART RATE: 73 BPM | BODY MASS INDEX: 36.49 KG/M2 | DIASTOLIC BLOOD PRESSURE: 80 MMHG | HEIGHT: 68 IN | TEMPERATURE: 97.9 F | WEIGHT: 240.8 LBS

## 2024-08-06 DIAGNOSIS — M54.50 BILATERAL LOW BACK PAIN WITHOUT SCIATICA, UNSPECIFIED CHRONICITY: ICD-10-CM

## 2024-08-06 DIAGNOSIS — M19.90 ARTHRITIS: ICD-10-CM

## 2024-08-06 PROCEDURE — 3074F SYST BP LT 130 MM HG: CPT | Performed by: INTERNAL MEDICINE

## 2024-08-06 PROCEDURE — 3044F HG A1C LEVEL LT 7.0%: CPT | Performed by: INTERNAL MEDICINE

## 2024-08-06 PROCEDURE — 3008F BODY MASS INDEX DOCD: CPT | Performed by: INTERNAL MEDICINE

## 2024-08-06 PROCEDURE — 3049F LDL-C 100-129 MG/DL: CPT | Performed by: INTERNAL MEDICINE

## 2024-08-06 PROCEDURE — 1159F MED LIST DOCD IN RCRD: CPT | Performed by: INTERNAL MEDICINE

## 2024-08-06 PROCEDURE — 1125F AMNT PAIN NOTED PAIN PRSNT: CPT | Performed by: INTERNAL MEDICINE

## 2024-08-06 PROCEDURE — RXMED WILLOW AMBULATORY MEDICATION CHARGE

## 2024-08-06 PROCEDURE — 3079F DIAST BP 80-89 MM HG: CPT | Performed by: INTERNAL MEDICINE

## 2024-08-06 PROCEDURE — 1036F TOBACCO NON-USER: CPT | Performed by: INTERNAL MEDICINE

## 2024-08-06 PROCEDURE — 99214 OFFICE O/P EST MOD 30 MIN: CPT | Performed by: INTERNAL MEDICINE

## 2024-08-06 RX ORDER — IBUPROFEN 800 MG/1
800 TABLET ORAL 2 TIMES DAILY PRN
Qty: 180 TABLET | Refills: 1 | Status: SHIPPED | OUTPATIENT
Start: 2024-08-06 | End: 2025-08-06

## 2024-08-06 RX ORDER — GABAPENTIN 100 MG/1
100 CAPSULE ORAL 3 TIMES DAILY
Qty: 270 CAPSULE | Refills: 2 | Status: SHIPPED | OUTPATIENT
Start: 2024-08-06 | End: 2025-08-05

## 2024-08-06 ASSESSMENT — PAIN SCALES - GENERAL: PAINLEVEL: 4

## 2024-08-06 NOTE — PROGRESS NOTES
At  patient requested EMLA for next delivery. [facility] please fill for September delivery.       Spoke with patient. She will  package from warehouse or pharmacy staff by 11 am wed August 7th.     Sending drug, standard supplies for 4 doses q weekly of Hizentra delivered subcutaneously  via [facility] 60 Pump +premeds.     Patient had no questions for pharmacist.

## 2024-08-06 NOTE — PROGRESS NOTES
She noted improvement in right groin pain following ultrasound-guided intra-articular steroid injection of the right groin (5/21/2024) by orthopedic surgeon.  She continues to note some discomfort in the right lower back.  She had notes recurrence of locking and pain in the third digit of the right hand.  Thumb restless legs symptoms improved with taking gabapentin 600 mg at bedtime.  She has been taking subcutaneous immunoglobulin injections for the common variable immunodeficiency disorder.  She is taking tirzepatide 12.5 mg subcutaneously weekly.  She no longer needs to take any insulin for hyperglycemia.    There is tightness and tenderness in the supraspinatus muscles bilaterally.  There is tenderness in the right lower back.  There is tender nodule involving the proximal aspect of the flexor tendon of the third digit of the right hand.    Laboratory (4/10/2024) ESR 14, TSH 0.81, BUN 18, creatinine 0.78, glucose 72, WBC 10.1, hemoglobin 13.1, hematocrit 39.9, MCV 88, MCHC 32.8, platelets 240, hemoglobin A1c 5.3%, IgM 163, IgG 1120, IgA 165, alkaline phosphatase 67, AST 15, ALT 18, calcium 8.4, albumin 4.1.  2D echocardiogram (5/30/2024) left ventricular ejection fraction 60 to 65%, mild aortic valve stenosis, trivial aortic regurgitation, trace mitral and tricuspid regurgitation.  Physiologic pulmonic regurgitation.    She has history of hypothyroidism, common variable immunodeficiency disorder, aortic stenosis, bilateral diaphragmatic paralysis, right worse than left, obstructive sleep apnea, type 2 diabetes mellitus, cervical and lumbar spondylosis, flexor tendinitis of the third digit of the right hand.    She was given a local injection of triamcinolone 20 mg with 1 mL of 1% lidocaine to the region of tenderness in the right lower back and right supraspinatus muscle.  She was given an injection of methylprednisolone 2 mg with 0.5 mL of 1% lidocaine to flexor tendon nodule and third digit of the right hand.   She is to return at the next available office appointment.

## 2024-08-07 ENCOUNTER — APPOINTMENT (OUTPATIENT)
Dept: PHYSICAL THERAPY | Facility: CLINIC | Age: 64
End: 2024-08-07
Payer: COMMERCIAL

## 2024-08-08 ENCOUNTER — PHARMACY VISIT (OUTPATIENT)
Dept: PHARMACY | Facility: CLINIC | Age: 64
End: 2024-08-08
Payer: COMMERCIAL

## 2024-08-08 PROCEDURE — RXMED WILLOW AMBULATORY MEDICATION CHARGE

## 2024-08-08 RX ORDER — SYRINGE, DISPOSABLE, 1 ML
SYRINGE, EMPTY DISPOSABLE MISCELLANEOUS
Qty: 2 EACH | Refills: 11 | OUTPATIENT
Start: 2024-08-08

## 2024-08-08 RX ORDER — SYRINGE WITH NEEDLE, 1 ML 25GX5/8"
1 SYRINGE, EMPTY DISPOSABLE MISCELLANEOUS
Qty: 300 EACH | Refills: 3 | Status: CANCELLED | OUTPATIENT
Start: 2024-08-08

## 2024-08-08 RX ORDER — SYRINGE WITH NEEDLE, 1 ML 25GX5/8"
SYRINGE, EMPTY DISPOSABLE MISCELLANEOUS
Qty: 2 EACH | Refills: 11 | OUTPATIENT
Start: 2024-08-08

## 2024-08-08 RX ORDER — CYANOCOBALAMIN 1000 UG/ML
INJECTION, SOLUTION INTRAMUSCULAR; SUBCUTANEOUS
Qty: 3 ML | Refills: 4 | OUTPATIENT
Start: 2024-08-08

## 2024-08-12 ENCOUNTER — SPECIALTY PHARMACY (OUTPATIENT)
Dept: PHARMACY | Facility: CLINIC | Age: 64
End: 2024-08-12

## 2024-08-12 ENCOUNTER — PHARMACY VISIT (OUTPATIENT)
Dept: PHARMACY | Facility: CLINIC | Age: 64
End: 2024-08-12
Payer: COMMERCIAL

## 2024-08-12 DIAGNOSIS — G25.81 RLS (RESTLESS LEGS SYNDROME): ICD-10-CM

## 2024-08-12 PROCEDURE — RXMED WILLOW AMBULATORY MEDICATION CHARGE

## 2024-08-12 RX ORDER — GABAPENTIN 300 MG/1
600 CAPSULE ORAL NIGHTLY
Qty: 180 CAPSULE | Refills: 0 | Status: SHIPPED | OUTPATIENT
Start: 2024-08-12 | End: 2024-11-12

## 2024-08-12 NOTE — TELEPHONE ENCOUNTER
PDMP reviewed. Patient on neurontin for RLS with significant benefits. Script is refilled. Followup on 10/23/24.

## 2024-08-12 NOTE — PROGRESS NOTES
"[facility] Clinical Note    Lara Morris is a 64 y.o. female, who is on the specialty pharmacy service for management of:  Asthma Core.    Lara Morris is taking: Nucala.    Medication Receipt Date: Established on therapy  Medication Start Date (planned or actual): Established on therapy    Lara was contacted on 8/12/2024 at 2:15 PM for a virtual pharmacy visit with encounter number [id number] from:   [facility]  [address] [place] [id number]  Dept: [phone]  Dept Fax: [phone]    Lara was offered a Telephone visit.  Lara consented to a Telephone visit, which was performed.    Pharmacy will continue to collaborate in the care of this patient with the referring prescriber Emilie Lee.    General Assessment  Patient reports Nucala is working effectively for her asthma with no side effects but can tell when she is close to next dose (\"feels wheezing\" then).  She also noted she has not had any recent infections on Hizentra. Patient stated she has a follow up appointment scheduled with her allergist next Monday.     Impression/Plan  IMPRESSION/PLAN:  Is patient high risk (potential patients: pregnancy, geriatric, pediatric)? No   Is laboratory follow-up needed? Per MD  Is a clinical intervention needed? No  Next reassessment date? 6 months    Refer to the encounter summary report for documentation details about patient counseling and education.      Medication Adherence  The importance of adherence was discussed with the patient and they were advised to take the medication as prescribed by their provider. Patient was encouraged to call their physician's office if they have a question regarding a missed dose.     QOL/Patient Satisfaction  Rate your quality of life on scale of 1-10: -- (Unable to fully assess)  Rate your satisfaction with  Specialty Pharmacy on scale of 1-10: 10 - Completely satisfied (No issues reported)    Patient was " advised to contact the pharmacy if there are any changes to their medication list, including prescriptions, OTC medications, herbal products, or supplements. Patient was advised of [facility]'s dispensing process, refill timeline, contact information ([phone]), and patient management follow up. Patient confirmed understanding of education conducted during assessment. All patient questions and concerns were addressed to the best of my ability. Patient was encouraged to contact the specialty pharmacy with any questions or concerns.    Confirmed follow-up outreaches are properly scheduled and reviewed goals of therapy with the patient.        Lizet Holguin, PharmD

## 2024-08-13 PROCEDURE — RXMED WILLOW AMBULATORY MEDICATION CHARGE

## 2024-08-13 RX ORDER — FLUTICASONE FUROATE, UMECLIDINIUM BROMIDE AND VILANTEROL TRIFENATATE 200; 62.5; 25 UG/1; UG/1; UG/1
1 POWDER RESPIRATORY (INHALATION) DAILY
Qty: 60 EACH | Refills: 11 | Status: CANCELLED | OUTPATIENT
Start: 2024-08-13 | End: 2025-08-13

## 2024-08-13 RX ORDER — CLOBETASOL PROPIONATE 0.5 MG/G
OINTMENT TOPICAL
Qty: 45 G | Refills: 2 | OUTPATIENT
Start: 2024-08-13

## 2024-08-14 ENCOUNTER — APPOINTMENT (OUTPATIENT)
Dept: PHYSICAL THERAPY | Facility: CLINIC | Age: 64
End: 2024-08-14
Payer: COMMERCIAL

## 2024-08-14 ENCOUNTER — PHARMACY VISIT (OUTPATIENT)
Dept: PHARMACY | Facility: CLINIC | Age: 64
End: 2024-08-14
Payer: COMMERCIAL

## 2024-08-15 PROCEDURE — RXMED WILLOW AMBULATORY MEDICATION CHARGE

## 2024-08-16 ENCOUNTER — PHARMACY VISIT (OUTPATIENT)
Dept: PHARMACY | Facility: CLINIC | Age: 64
End: 2024-08-16
Payer: COMMERCIAL

## 2024-08-19 DIAGNOSIS — B99.9 RECURRENT INFECTIONS: Primary | ICD-10-CM

## 2024-08-19 DIAGNOSIS — D83.9 COMMON VARIABLE IMMUNODEFICIENCY (MULTI): ICD-10-CM

## 2024-08-19 PROCEDURE — RXMED WILLOW AMBULATORY MEDICATION CHARGE

## 2024-08-21 ENCOUNTER — PHARMACY VISIT (OUTPATIENT)
Dept: PHARMACY | Facility: CLINIC | Age: 64
End: 2024-08-21
Payer: COMMERCIAL

## 2024-08-22 ENCOUNTER — SPECIALTY PHARMACY (OUTPATIENT)
Dept: PHARMACY | Facility: CLINIC | Age: 64
End: 2024-08-22

## 2024-08-22 ENCOUNTER — PHARMACY VISIT (OUTPATIENT)
Dept: PHARMACY | Facility: CLINIC | Age: 64
End: 2024-08-22

## 2024-08-22 DIAGNOSIS — G51.32 HEMIFACIAL SPASM OF LEFT SIDE OF FACE: Primary | ICD-10-CM

## 2024-08-22 PROCEDURE — RXMED WILLOW AMBULATORY MEDICATION CHARGE

## 2024-08-26 ENCOUNTER — SPECIALTY PHARMACY (OUTPATIENT)
Dept: PHARMACY | Facility: CLINIC | Age: 64
End: 2024-08-26

## 2024-08-28 ENCOUNTER — APPOINTMENT (OUTPATIENT)
Dept: PHYSICAL THERAPY | Facility: CLINIC | Age: 64
End: 2024-08-28
Payer: COMMERCIAL

## 2024-08-28 ENCOUNTER — PATIENT MESSAGE (OUTPATIENT)
Dept: PRIMARY CARE | Facility: CLINIC | Age: 64
End: 2024-08-28
Payer: COMMERCIAL

## 2024-08-28 ENCOUNTER — PHARMACY VISIT (OUTPATIENT)
Dept: PHARMACY | Facility: CLINIC | Age: 64
End: 2024-08-28
Payer: COMMERCIAL

## 2024-08-28 ENCOUNTER — HOME INFUSION (OUTPATIENT)
Dept: INFUSION THERAPY | Age: 64
End: 2024-08-28
Payer: COMMERCIAL

## 2024-08-28 DIAGNOSIS — N64.4 BREAST PAIN, LEFT: Primary | ICD-10-CM

## 2024-08-28 PROCEDURE — RXMED WILLOW AMBULATORY MEDICATION CHARGE

## 2024-08-28 RX ORDER — PREDNISONE 10 MG/1
TABLET ORAL
Qty: 20 TABLET | Refills: 0 | OUTPATIENT
Start: 2024-08-28

## 2024-08-28 NOTE — PROGRESS NOTES
RX received to increase Hizentra infusion to the next higher flow rate.   Patient is currently using .   Next available size is .   Per calculator:  120ml   4 needles 26G   tubing  Infusion rate will now go over approx 1hr 13mins.     tubing added to ticket for next fill.    Patient has confirmed that she will pick next delivery up on Weds 9/4/24. Package to be ready by 1130a. Patient needs Emla with this next fill.    Follow up 9/3/24 to check FOTM and fill for Weds pickup by 1130a with new tubing size.

## 2024-08-29 ENCOUNTER — PHARMACY VISIT (OUTPATIENT)
Dept: PHARMACY | Facility: CLINIC | Age: 64
End: 2024-08-29
Payer: COMMERCIAL

## 2024-08-30 ENCOUNTER — LAB (OUTPATIENT)
Dept: LAB | Facility: LAB | Age: 64
End: 2024-08-30
Payer: COMMERCIAL

## 2024-08-30 DIAGNOSIS — G25.81 RLS (RESTLESS LEGS SYNDROME): ICD-10-CM

## 2024-08-30 DIAGNOSIS — E83.10 DISORDER OF IRON METABOLISM: ICD-10-CM

## 2024-08-30 DIAGNOSIS — D83.9 COMMON VARIABLE IMMUNODEFICIENCY (MULTI): ICD-10-CM

## 2024-08-30 LAB
ALBUMIN SERPL BCP-MCNC: 4.2 G/DL (ref 3.4–5)
ALP SERPL-CCNC: 61 U/L (ref 33–136)
ALT SERPL W P-5'-P-CCNC: 18 U/L (ref 7–45)
ANION GAP SERPL CALC-SCNC: 12 MMOL/L (ref 10–20)
AST SERPL W P-5'-P-CCNC: 13 U/L (ref 9–39)
BASOPHILS # BLD AUTO: 0.06 X10*3/UL (ref 0–0.1)
BASOPHILS NFR BLD AUTO: 0.6 %
BILIRUB SERPL-MCNC: 0.5 MG/DL (ref 0–1.2)
BUN SERPL-MCNC: 19 MG/DL (ref 6–23)
CALCIUM SERPL-MCNC: 8.9 MG/DL (ref 8.6–10.3)
CHLORIDE SERPL-SCNC: 102 MMOL/L (ref 98–107)
CO2 SERPL-SCNC: 27 MMOL/L (ref 21–32)
CREAT SERPL-MCNC: 0.74 MG/DL (ref 0.5–1.05)
EGFRCR SERPLBLD CKD-EPI 2021: 89 ML/MIN/1.73M*2
EOSINOPHIL # BLD AUTO: 0.08 X10*3/UL (ref 0–0.7)
EOSINOPHIL NFR BLD AUTO: 0.8 %
ERYTHROCYTE [DISTWIDTH] IN BLOOD BY AUTOMATED COUNT: 14.3 % (ref 11.5–14.5)
FERRITIN SERPL-MCNC: 250 NG/ML (ref 8–150)
GLUCOSE SERPL-MCNC: 59 MG/DL (ref 74–99)
HCT VFR BLD AUTO: 43.8 % (ref 36–46)
HGB BLD-MCNC: 14.9 G/DL (ref 12–16)
IGA SERPL-MCNC: 177 MG/DL (ref 70–400)
IGG SERPL-MCNC: 1340 MG/DL (ref 700–1600)
IGM SERPL-MCNC: 179 MG/DL (ref 40–230)
IMM GRANULOCYTES # BLD AUTO: 0.05 X10*3/UL (ref 0–0.7)
IMM GRANULOCYTES NFR BLD AUTO: 0.5 % (ref 0–0.9)
IRON SATN MFR SERPL: 16 % (ref 25–45)
IRON SERPL-MCNC: 66 UG/DL (ref 35–150)
LYMPHOCYTES # BLD AUTO: 3.23 X10*3/UL (ref 1.2–4.8)
LYMPHOCYTES NFR BLD AUTO: 31.3 %
MCH RBC QN AUTO: 31.2 PG (ref 26–34)
MCHC RBC AUTO-ENTMCNC: 34 G/DL (ref 32–36)
MCV RBC AUTO: 92 FL (ref 80–100)
MONOCYTES # BLD AUTO: 0.71 X10*3/UL (ref 0.1–1)
MONOCYTES NFR BLD AUTO: 6.9 %
NEUTROPHILS # BLD AUTO: 6.19 X10*3/UL (ref 1.2–7.7)
NEUTROPHILS NFR BLD AUTO: 59.9 %
NRBC BLD-RTO: 0 /100 WBCS (ref 0–0)
PLATELET # BLD AUTO: 302 X10*3/UL (ref 150–450)
POTASSIUM SERPL-SCNC: 4.1 MMOL/L (ref 3.5–5.3)
PROT SERPL-MCNC: 6.8 G/DL (ref 6.4–8.2)
RBC # BLD AUTO: 4.77 X10*6/UL (ref 4–5.2)
SODIUM SERPL-SCNC: 137 MMOL/L (ref 136–145)
TIBC SERPL-MCNC: 409 UG/DL (ref 240–445)
UIBC SERPL-MCNC: 343 UG/DL (ref 110–370)
WBC # BLD AUTO: 10.3 X10*3/UL (ref 4.4–11.3)

## 2024-08-30 PROCEDURE — 85025 COMPLETE CBC W/AUTO DIFF WBC: CPT

## 2024-08-30 PROCEDURE — 83540 ASSAY OF IRON: CPT

## 2024-08-30 PROCEDURE — 83550 IRON BINDING TEST: CPT

## 2024-08-30 PROCEDURE — 82728 ASSAY OF FERRITIN: CPT

## 2024-08-30 PROCEDURE — 80053 COMPREHEN METABOLIC PANEL: CPT

## 2024-08-30 PROCEDURE — 82784 ASSAY IGA/IGD/IGG/IGM EACH: CPT

## 2024-08-30 PROCEDURE — 36415 COLL VENOUS BLD VENIPUNCTURE: CPT

## 2024-09-03 ENCOUNTER — HOME INFUSION (OUTPATIENT)
Dept: INFUSION THERAPY | Age: 64
End: 2024-09-03
Payer: COMMERCIAL

## 2024-09-03 NOTE — PROGRESS NOTES
Review of chart - pt with order for Hizentra 24gm weekly. New order valid for 6 total fills thru 6/4/25.  This is fill 4/6 on current order  Auth good thru 7/2025  Sept FOTM verified.   Pt has been set up with copay assistance program.     Pt contacted, requesting to  medication 9/4 by 1130 am.  tubing to be sent starting this delivery.     Pharmacy to dispense the following 9/3 for  9/4:  8x Hizentra 10gm/50mL vials  4x Hizentra 4gm/20mL vials  DOS 9/4-10/1     Next delivery due ~10/1     FU 9/25 check next delivery date, check supply needs with patient. Place on Oct priority FOTM list

## 2024-09-06 ENCOUNTER — SPECIALTY PHARMACY (OUTPATIENT)
Dept: PHARMACY | Facility: CLINIC | Age: 64
End: 2024-09-06

## 2024-09-06 PROCEDURE — RXMED WILLOW AMBULATORY MEDICATION CHARGE

## 2024-09-09 ENCOUNTER — PHARMACY VISIT (OUTPATIENT)
Dept: PHARMACY | Facility: CLINIC | Age: 64
End: 2024-09-09
Payer: COMMERCIAL

## 2024-09-13 PROCEDURE — RXMED WILLOW AMBULATORY MEDICATION CHARGE

## 2024-09-16 ENCOUNTER — APPOINTMENT (OUTPATIENT)
Dept: NEUROLOGY | Facility: CLINIC | Age: 64
End: 2024-09-16
Payer: COMMERCIAL

## 2024-09-16 ENCOUNTER — PHARMACY VISIT (OUTPATIENT)
Dept: PHARMACY | Facility: CLINIC | Age: 64
End: 2024-09-16
Payer: COMMERCIAL

## 2024-09-16 VITALS — BODY MASS INDEX: 36.04 KG/M2 | WEIGHT: 237 LBS

## 2024-09-16 DIAGNOSIS — N64.4 BREAST PAIN: ICD-10-CM

## 2024-09-16 DIAGNOSIS — G51.32 CLONIC HEMIFACIAL SPASM OF MUSCLE OF LEFT SIDE OF FACE: Primary | ICD-10-CM

## 2024-09-16 PROCEDURE — 64612 DESTROY NERVE FACE MUSCLE: CPT | Performed by: PSYCHIATRY & NEUROLOGY

## 2024-09-16 NOTE — PROGRESS NOTES
Neurology Botulinum Injection    Subjective   Lara Morris is an 64 y.o. female here for medical botulinum injection for Clonic hemifacial spasm of muscle of left side of face [G51.32].        Injections worked well last time. Partially worn off.  Mild L lower facial weakness this time, but it did not happen previously and the dose was not increased.             PMHx   CVID , taking IVIG for 12 years   Asthma  osteoarthritis   B12 deficiency   bilateral hearing impairment   wears CPAP   diaphragmatic pacemaker placed     PSHx  Bilateral knee replacement      Objective   Neurological Exam  Physical Exam  ProceduresroceduresPrior to the start of the procedure a time out was taken and the following were verified: the identity of the patient using two patient identifiers (name, ) - this was verified by the patient      The area was prepped in the usual sterile fashion. Botulinum toxin type A (Botox)was injected as follows:    Dose Sites Muscle  5 units 1 left superomedial orbicularis ocularis   5 units 1 left superolateral orbicularis ocularis   5 units 1 left lateral orbicularis ocularis   5 units 1 left Inferior lateral orbicularis ocularis   5 units 1 left Inferior orbicularis ocularis   3 units 1 left zygomaticus   1 unit 1 left risorius  1 unit 1 left upper orbicularis oris    10 Unit        Lt platysma        Patient supplied Botox.  Total botulinum toxin used was 40 units, 60 discarded, patient supplied 100 units     Assessment/Plan hemifacial spasm

## 2024-09-17 ENCOUNTER — HOSPITAL ENCOUNTER (OUTPATIENT)
Dept: RADIOLOGY | Facility: CLINIC | Age: 64
Discharge: HOME | End: 2024-09-17
Payer: COMMERCIAL

## 2024-09-17 ENCOUNTER — TELEPHONE (OUTPATIENT)
Dept: SURGICAL ONCOLOGY | Facility: HOSPITAL | Age: 64
End: 2024-09-17
Payer: COMMERCIAL

## 2024-09-17 ENCOUNTER — PHARMACY VISIT (OUTPATIENT)
Dept: PHARMACY | Facility: CLINIC | Age: 64
End: 2024-09-17

## 2024-09-17 DIAGNOSIS — N64.4 BREAST PAIN: ICD-10-CM

## 2024-09-17 PROCEDURE — 76981 USE PARENCHYMA: CPT | Mod: LT

## 2024-09-17 PROCEDURE — 76642 ULTRASOUND BREAST LIMITED: CPT | Mod: LEFT SIDE | Performed by: RADIOLOGY

## 2024-09-17 PROCEDURE — 76642 ULTRASOUND BREAST LIMITED: CPT | Mod: LT

## 2024-09-17 NOTE — TELEPHONE ENCOUNTER
Result Communication    Office to call and reschedule pt's apt.    Resulted Orders   BI US breast limited left    Narrative    Interpreted By:  Patrizia Haley,   STUDY:  BI US BREAST LIMITED LEFT;  9/17/2024 9:53 am      ACCESSION NUMBER(S):  [id number]      ORDERING CLINICIAN:  HARLEEN VILLATORO      INDICATION:  Two-week history of sharp stabbing pain at the 2 o'clock position of  the left breast which has subsided into a dull ache. One-week history  of non spontaneous nipple discharge.      ,N64.4 Mastodynia      COMPARISON:  Bilateral mammogram 03/28/2024, 12/19/2020 2, 09/01/2020      FINDINGS:  ULTRASOUND: Targeted ultrasound was performed of the subareolar left  breast and upper-outer left breast, inclusive of the area of focal  pain at the 2 o'clock position by a registered sonographer with  elastography.      Normal fibroglandular tissues visualized throughout the upper outer  left breast, with no focal mass or shadowing in the area of focal  pain, 2 o'clock position.      Sonographic scanning of the subareolar left breast demonstrates  minimal amount of anechoic fluid within subareolar left breast ducts  which gradually taper. No intraductal masses. No abnormal Doppler  blood flow. The ducts are soft on elastography.        Impression    No sonographic abnormality at the site of focal pain, 2 o'clock  position left breast. Independent clinical evaluation recommended.      Mild benign ductal ectasia, subareolar left breast.      BI-RADS CATEGORY:  BI-RADS Category:  2 Benign.  Recommendation:  Clinical follow-up.  Recommended Date:  Immediate.  Laterality:  Left.      For any future breast imaging appointments, please call [phone] ([phone]).          MACRO:  None      Signed by: Patrizia Haley 9/17/2024 9:58 AM  Dictation workstation:   [id number]       10:18 AM

## 2024-09-18 ENCOUNTER — APPOINTMENT (OUTPATIENT)
Dept: SURGICAL ONCOLOGY | Facility: CLINIC | Age: 64
End: 2024-09-18
Payer: COMMERCIAL

## 2024-09-24 ENCOUNTER — CLINICAL SUPPORT (OUTPATIENT)
Dept: AUDIOLOGY | Facility: CLINIC | Age: 64
End: 2024-09-24

## 2024-09-24 DIAGNOSIS — H90.3 SENSORINEURAL HEARING LOSS (SNHL) OF BOTH EARS: Primary | ICD-10-CM

## 2024-09-24 PROCEDURE — 92550 TYMPANOMETRY & REFLEX THRESH: CPT | Performed by: AUDIOLOGIST

## 2024-09-24 PROCEDURE — 92557 COMPREHENSIVE HEARING TEST: CPT | Performed by: AUDIOLOGIST

## 2024-09-24 PROCEDURE — V5011 HEARING AID FITTING/CHECKING: HCPCS | Mod: AUDSP | Performed by: AUDIOLOGIST

## 2024-09-24 ASSESSMENT — PAIN SCALES - GENERAL: PAINLEVEL_OUTOF10: 0 - NO PAIN

## 2024-09-24 ASSESSMENT — PAIN - FUNCTIONAL ASSESSMENT: PAIN_FUNCTIONAL_ASSESSMENT: 0-10

## 2024-09-24 NOTE — PROGRESS NOTES
HISTORY:  Annual hearing evaluation.  She feels that her hearing is stable at this time.   She feels that her hearing aid batteries are not lasting as long as they used too.  She uses then from 630 am to 1100 pm each day.    No pain  No aural fullness  No vertigo  No recent ear infections.    No falls    Hearing aid information: Fit 9/9/19  Right ear: AudÃ©o M70-RSN: [id number]  Left ear: AudÃ©o M70-RSN: [id number]  M Receivers size 1 medium vented domes.   Warranty expires 11/13/2022    RESULTS:  Otoscopic inspection showed clear ear canals bilaterally.    Immittance testing showed normal tympanograms bilaterally.   Ipsilateral acoustic reflexes were tested at 500-4000Hz in both ears.  They were present at 500-4000Hz in the left ear and absent  in the right ear.   Pure tone air and bone conduction testing showed normal hearing at 125Hz sloping to mild at 250-500Hz rising to normal at 1000-2000Hz sloping to moderate at 8000Hz in both ears.    Word discrimination scores were excellent bilaterally.    IMPRESSIONS:  Her hearing has improved since her previous audiogram.  She was connected to [employer] today and both hearing aids were reprogrammed to todays thresholds.  She wanted an increase in volume on both hearing aids.  I changed both wax traps and domes and both hearing aids sound good per listening check.      Treatment Plan:   Annual hearing evaluations.   Hearing aid checks as needed.     TIME:  8622-1513

## 2024-09-25 ENCOUNTER — OFFICE VISIT (OUTPATIENT)
Dept: ORTHOPEDIC SURGERY | Facility: HOSPITAL | Age: 64
End: 2024-09-25
Payer: COMMERCIAL

## 2024-09-25 ENCOUNTER — HOSPITAL ENCOUNTER (OUTPATIENT)
Dept: RADIOLOGY | Facility: EXTERNAL LOCATION | Age: 64
Discharge: HOME | End: 2024-09-25

## 2024-09-25 DIAGNOSIS — M16.11 OSTEOARTHRITIS OF RIGHT HIP, UNSPECIFIED OSTEOARTHRITIS TYPE: Primary | ICD-10-CM

## 2024-09-25 PROCEDURE — 99214 OFFICE O/P EST MOD 30 MIN: CPT | Performed by: STUDENT IN AN ORGANIZED HEALTH CARE EDUCATION/TRAINING PROGRAM

## 2024-09-25 PROCEDURE — 1125F AMNT PAIN NOTED PAIN PRSNT: CPT | Performed by: STUDENT IN AN ORGANIZED HEALTH CARE EDUCATION/TRAINING PROGRAM

## 2024-09-25 PROCEDURE — 3044F HG A1C LEVEL LT 7.0%: CPT | Performed by: STUDENT IN AN ORGANIZED HEALTH CARE EDUCATION/TRAINING PROGRAM

## 2024-09-25 PROCEDURE — 1159F MED LIST DOCD IN RCRD: CPT | Performed by: STUDENT IN AN ORGANIZED HEALTH CARE EDUCATION/TRAINING PROGRAM

## 2024-09-25 PROCEDURE — 2500000004 HC RX 250 GENERAL PHARMACY W/ HCPCS (ALT 636 FOR OP/ED): Performed by: STUDENT IN AN ORGANIZED HEALTH CARE EDUCATION/TRAINING PROGRAM

## 2024-09-25 PROCEDURE — 20611 DRAIN/INJ JOINT/BURSA W/US: CPT | Mod: RT | Performed by: STUDENT IN AN ORGANIZED HEALTH CARE EDUCATION/TRAINING PROGRAM

## 2024-09-25 PROCEDURE — 99214 OFFICE O/P EST MOD 30 MIN: CPT | Mod: GC | Performed by: STUDENT IN AN ORGANIZED HEALTH CARE EDUCATION/TRAINING PROGRAM

## 2024-09-25 PROCEDURE — 2500000005 HC RX 250 GENERAL PHARMACY W/O HCPCS: Performed by: STUDENT IN AN ORGANIZED HEALTH CARE EDUCATION/TRAINING PROGRAM

## 2024-09-25 PROCEDURE — 3049F LDL-C 100-129 MG/DL: CPT | Performed by: STUDENT IN AN ORGANIZED HEALTH CARE EDUCATION/TRAINING PROGRAM

## 2024-09-25 RX ORDER — LIDOCAINE HYDROCHLORIDE 10 MG/ML
2 INJECTION, SOLUTION EPIDURAL; INFILTRATION; INTRACAUDAL; PERINEURAL
Status: COMPLETED | OUTPATIENT
Start: 2024-09-25 | End: 2024-09-25

## 2024-09-25 RX ORDER — ROPIVACAINE HYDROCHLORIDE 5 MG/ML
3 INJECTION, SOLUTION EPIDURAL; INFILTRATION; PERINEURAL
Status: COMPLETED | OUTPATIENT
Start: 2024-09-25 | End: 2024-09-25

## 2024-09-25 RX ORDER — METHYLPREDNISOLONE ACETATE 40 MG/ML
40 INJECTION, SUSPENSION INTRA-ARTICULAR; INTRALESIONAL; INTRAMUSCULAR; SOFT TISSUE
Status: COMPLETED | OUTPATIENT
Start: 2024-09-25 | End: 2024-09-25

## 2024-09-25 ASSESSMENT — PAIN - FUNCTIONAL ASSESSMENT: PAIN_FUNCTIONAL_ASSESSMENT: 0-10

## 2024-09-25 ASSESSMENT — PAIN SCALES - GENERAL: PAINLEVEL_OUTOF10: 3

## 2024-09-25 NOTE — PROGRESS NOTES
REFERRAL SOURCE: No ref. provider found     CHIEF COMPLAINT: right hip and low back pain    HISTORY OF PRESENT ILLNESS  Lara Morris is a very pleasant 64 y.o. female who works as a homecare OT manager with history of HLD, HLD, diaphragmatic pacer, GERD, immunodeficiency, b/l knee replacements done 15 years ago, diabetes (A1c 4/10/24 was 5.3%)  who is here for evaluation of right hip and low back pain.     Previous History:  5/21/24: Reports right lower back pain beginning about a year ago, groin pain 4-5 months ago without inciting event. Pain has been progressively worsening, unable to lay on her sides due to pain. Describes pain as dull, achy without radiation. Back pain is worse in the AM, groin pain worsens as day goes on. Pain worsened by twisting, bending down and improves with ibuprofen and tylenol as well as sitting. No prior therapy or injections into back or hip. Lost 60 lbs since June. Had x rays of the spine and hips done in the summer of 2023.    Interval History:  9/25/24: Patient reports that the last intra-articular hip injection helped approximately 100% and lasted approximately 3 months. Notices it when she does any twisting of her hips. Denies it waking her up from sleep. Denies numbness or tingling of the RLE. Continues with her home exercise program daily.     MEDS    Current Outpatient Medications:     acetaminophen (Tylenol) 325 mg tablet, Take 2 tablets (650 mg) by mouth every 6 hours if needed for mild pain (1 - 3)., Disp: , Rfl:     acyclovir-hydrocortisone 5-1 % cream, Xerese 5-1 % External Cream Quantity: 5 Refills: 0 DO Start : 22-Mar-2018 Active, Disp: , Rfl:     albuterol 90 mcg/actuation inhaler, Inhale 2 puff by mouth every 4 hours as needed Inhalation, Disp: 54 g, Rfl: 6    albuterol 90 mcg/actuation inhaler, Inhale 2 puffs every 4 hours as needed, Disp: 54 g, Rfl: 6    azelastine (Astelin) 137 mcg (0.1 %) nasal spray, USE 1-2 SPRAYS IN EACH NOSTRIL ONCE DAILY TO TWO TIMES A DAY AS  NEEDED FOR NASAL DRAINAGE, Disp: 90 mL, Rfl: 3    blood sugar diagnostic (Accu-Chek Zakiya Plus test strp) strip, Use one strip 3 times daily as directed, Disp: 300 strip, Rfl: 3    calcium citrate (Calcitrate) 200 mg (950 mg) tablet, Take 1 tablet (200 mg) by mouth once daily., Disp: , Rfl:     cetirizine (ZyrTEC) 10 mg tablet, Take 1 tablet (10 mg) by mouth 2 times a day., Disp: , Rfl:     cholecalciferol (Vitamin D-3) 25 MCG (1000 UT) tablet, Take 1 tablet (25 mcg) by mouth once daily., Disp: , Rfl:     clobetasol (Temovate) 0.05 % cream, Apply 1 application twice daily as needed for rash., Disp: 60 g, Rfl: 1    clobetasol (Temovate) 0.05 % ointment, Apply 1 application 2 times daily for 2 weeks and then as needed for 2 weeks., Disp: 45 g, Rfl: 2    cyanocobalamin (Vitamin B-12) 1,000 mcg/mL injection, Inject 1 mL into the muscle every 3 weeks., Disp: 3 mL, Rfl: 4    cyanocobalamin (Vitamin B-12) 1,000 mcg/mL injection, Inject 1 mL (1000 mcg) into the muscle every 3 weeks, Disp: 3 mL, Rfl: 4    desonide (DesOwen) 0.05 % cream, Apply 2 times a day to face as needed External, Disp: 60 g, Rfl: 3    eletriptan (Relpax) 40 mg tablet, Take 1 tablet at onset of headache. May repeat in 2 hours one time., Disp: 30 tablet, Rfl: 17    epinastine (Elestat) 0.05 % ophthalmic solution, Instill 1 drop into affected eye twice daily as needed, Disp: 5 mL, Rfl: 0    EPINEPHrine (EpiPen 2-Korey) 0.3 mg/0.3 mL injection syringe, Use intramuscularly as needed for allergic reaction., Disp: 10 each, Rfl: 6    EPINEPHrine (Epipen) 0.3 mg/0.3 mL injection syringe, USE AS NEEDED AS DIRECTED, Disp: 10 each, Rfl: 6    esomeprazole (NexIUM) 40 mg DR capsule, TAKE 1 CAPSULE BY MOUTH EVERY MORNING BEFORE MEALS, Disp: 90 capsule, Rfl: 3    ezetimibe (Zetia) 10 mg tablet, Take 1 tablet (10 mg) by mouth once daily., Disp: 90 tablet, Rfl: 3    ferrous fumarate-vitamin C (Romulo-Sequeles 65-25), Take 1 tablet by mouth 3 times daily (morning, midday,  late afternoon). Do not crush, chew, or split., Disp: , Rfl:     fluticasone (Flonase) 50 mcg/actuation nasal spray, Administer 2 sprays into each nostril once daily., Disp: , Rfl:     fluticasone-umeclidin-vilanter (TRELEGY-ELLIPTA) 200-62.5-25 mcg blister with device, INHALE 1 PUFF BY MOUTH ONCE DAILY, Disp: 60 each, Rfl: 11    gabapentin (Neurontin) 300 mg capsule, Take 2 capsules (600 mg) by mouth once daily at bedtime., Disp: 180 capsule, Rfl: 0    ginseng 100 mg capsule, Take 100 mg by mouth once daily., Disp: , Rfl:     glucosamine-chondroitin 500-400 mg tablet, Take 1 tablet by mouth 3 times a day., Disp: , Rfl:     ibuprofen 800 mg tablet, Take 1 tablet (800 mg) by mouth 2 times a day as needed for moderate pain (4 - 6)., Disp: 180 tablet, Rfl: 1    immune globulin, human, (Hizentra) subcutaneous infusion, Inject 120 mL (24 g) under the skin 1 (one) time per week., Disp: 3750 mL, Rfl: 0    ipratropium-albuteroL (Duo-Neb) 0.5-2.5 mg/3 mL nebulizer solution, Take 3 mL by nebulization 4 times a day as needed for shortness of breath or wheezing., Disp: , Rfl:     levothyroxine (Synthroid, Levoxyl) 150 mcg tablet, TAKE 1 TABLET BY MOUTH ONCE DAILY, Disp: 90 tablet, Rfl: 3    lidocaine-prilocaine (Emla) 2.5-2.5 % cream, Apply 1 application once a day as needed, Disp: 60 g, Rfl: 6    mepolizumab (Nucala) 100 mg/mL auto-injector, Inject 100 mg (1 pen) under the skin every 4 weeks as directed, Disp: 1 mL, Rfl: 12    montelukast (Singulair) 10 mg tablet, TAKE 1 TABLET BY MOUTH EVERY DAY, Disp: 90 tablet, Rfl: 3    montelukast (Singulair) 10 mg tablet, TAKE 1 TABLET BY MOUTH EVERY DAY, Disp: 90 tablet, Rfl: 3    MULTIVITAMIN ORAL, Take 1 tablet by mouth once daily., Disp: , Rfl:     omega 3-dha-epa-fish oil (Fish OiL) 1,200 (144-216) mg capsule, Take 1 capsule (1,200 mg) by mouth once daily., Disp: , Rfl:     omeprazole (PriLOSEC) 40 mg DR capsule, Take 1 capsule (40 mg) by mouth once daily in the morning. Take before  "meals. Do not crush or chew., Disp: 90 capsule, Rfl: 3    onabotulinumtoxinA (Botox) 100 unit injection, PHYSICIAN TO INJECT UP  UNITS INTRAMUSCULARLY EVERY 90 DAYS. FOR OFFICE USE ONLY, Disp: 1 each, Rfl: 3    pen needle, diabetic (BD Cici 2nd Gen Pen Needle) 32 gauge x 5/32\" needle, Use 1 pen needle to inject insulin under the skin 3 times a day before meals, Disp: 300 each, Rfl: 3    potassium &magnesium aspartate (magnesium, potassium aspartate) 250-250 mg capsule, Take 2 capsules by mouth once daily., Disp: , Rfl:     predniSONE (Deltasone) 10 mg tablet, Take 4 tabs by mouth daily for 2 days, then 3 tabs daily for 2 days, then 2 tabs daily for 2 days, then 1 tab daily for 2 days, Disp: 20 tablet, Rfl: 0    RESVERATROL ORAL, Take 1,450 mg by mouth once daily., Disp: , Rfl:     rizatriptan (Maxalt) 10 mg tablet, TAKE 1 TABLET BY MOUTH AT BEGINNING OF MIGRAINE. TAKE 1 BY MOUTH EVERY 2 HOURS IF MIGRAINE NOT RESOLVED. MAXIMUM OF 3 TABLET BY MOUTH IN 24 HOURS, Disp: 45 tablet, Rfl: 11    sulfamethoxazole-trimethoprim (Bactrim DS) 800-160 mg tablet, Take 1 tablet(s) by mouth once daily, Disp: 90 tablet, Rfl: 3    syringe with needle (BD Luer-Britni Syringe) 3 mL 25 x 5/8\" syringe, Use as directed for B12 injections, Disp: 2 each, Rfl: 11    syringe, disposable, (Monoject Tuberculin Syringe) 1 mL syringe, use 2 syringe(s) once a month, Disp: 2 each, Rfl: 11    tirzepatide (Mounjaro) 12.5 mg/0.5 mL pen injector, Inject 12.5 mg under the skin 1 (one) time per week., Disp: 2 mL, Rfl: 2    turmeric 400 mg capsule, Take by mouth., Disp: , Rfl:     Ventolin HFA 90 mcg/actuation inhaler, INHALE 2 PUFFS BY MOUTH EVERY 4 HOURS AS NEEDED (Patient taking differently: Inhale 2 puffs every 4 hours if needed for wheezing.), Disp: 54 g, Rfl: 11    ALLERGIES  Allergies   Allergen Reactions    Adhesive Hives     All adhesives (including clonidine patches) caused blistering hives    Gamunex-C [Immune Globul G-Gly-Iga Avg 46] " Anaphylaxis    Immune Globulin Hives and Shortness of breath    Immune Globulin,Gamma (Igg) Human Hives and Shortness of breath    Animal Dander Runny nose     Sneezing    Atorvastatin Myalgia    Codeine Nausea/vomiting    House Dust Runny nose     Sneezing    Ropinirole Hives    Tramadol Other     Nausea only    Ciprofloxacin Hives and Rash    Erythromycin Hives and Rash       PAST MEDICAL HISTORY  Past Medical History:   Diagnosis Date    Incisional hernia without obstruction or gangrene 01/22/2019    Incisional hernia, without obstruction or gangrene    Noninfective gastroenteritis and colitis, unspecified 04/22/2019    Ileitis    Personal history of other diseases of the circulatory system 01/06/2021    History of hypertension    Personal history of other diseases of the digestive system 06/07/2018    History of umbilical hernia    Personal history of other diseases of the nervous system and sense organs     History of peripheral neuropathy    Thyrotoxicosis, unspecified without thyrotoxic crisis or storm 01/25/2014    Hyperthyroidism       PAST SURGICAL HISTORY  Past Surgical History:   Procedure Laterality Date    COLONOSCOPY  11/04/2013    Complete Colonoscopy    GALLBLADDER SURGERY  11/03/2016    Gallbladder Surgery    HERNIA REPAIR  12/13/2016    Inguinal Hernia Repair    HYSTERECTOMY  11/03/2016    Hysterectomy    THYROID SURGERY  11/03/2016    Thyroid Surgery    TONSILLECTOMY  11/03/2016    Tonsillectomy With Adenoidectomy    TOTAL KNEE ARTHROPLASTY  06/07/2020    Total Knee Arthroplasty    VENTRAL HERNIA REPAIR  09/17/2018    Ventral Hernia Repair       SOCIAL HISTORY   Social History     Socioeconomic History    Marital status: Significant Other     Spouse name: Not on file    Number of children: Not on file    Years of education: Not on file    Highest education level: Not on file   Occupational History    Not on file   Tobacco Use    Smoking status: Former     Types: Cigarettes     Passive exposure:  Past    Smokeless tobacco: Never   Vaping Use    Vaping status: Never Used   Substance and Sexual Activity    Alcohol use: Never    Drug use: Never    Sexual activity: Not on file   Other Topics Concern    Not on file   Social History Narrative    Not on file     Social Determinants of Health     Financial Resource Strain: Not on file   Food Insecurity: Not on file   Transportation Needs: Not on file   Physical Activity: Not on file   Stress: Not on file   Social Connections: Not on file   Intimate Partner Violence: Not on file   Housing Stability: Not on file       FAMILY HISTORY  Family History   Problem Relation Name Age of Onset    Multiple sclerosis Mother      Coronary artery disease Father      Diabetes Father      Hypertension Father      Cystic fibrosis Sister          with gi manifestations    Cystic fibrosis Brother      Cystic fibrosis Son      Cystic fibrosis Other paternal relatives        REVIEW OF SYSTEMS  Except for those mentioned in the history of present illness, and below, a complete review of systems is negative.     Review of Systems    VITALS  There were no vitals filed for this visit.    PHYSICAL EXAMINATION   GENERAL:  Awake, alert, and oriented, no apparent distress, pleasant, and cooperative  PSYC: Mood is euthymic, affect is congruent  EAR, NOSE, THROAT:  Normocephalic, atraumatic, moist membranes, anicteric sclera  LUNG: Nonlabored breathing  HEART: No clubbing or cyanosis  SKIN: No increased erythema, warmth, rashes, or concerning skin lesions  NEURO: Sensation is intact in the bilateral lower extremities. Strength is grossly 5 out of 5 throughout the bilateral lower extremities, unless noted below.  GAIT: Non-antalgic  MUSCULOSKELETAL: Examination of the right hip: Hip range of motion was limited and painful with external rotation. Scour's was positive.  Natasha's was positive on right (groin). Hip thrust produces right groin pain. Ganeslen's and P4 are negative. No tenderness to  palpation over the greater trochanteric region, ASIS, AIIS, adductor tendons, piriformis, ischial tuberosity. Lucy's was negative. Pain with lumbar flexion and extension. Lumbar compression positive on right. Negative SLR.    IMAGING STUDIES:   Radiographs lumbar spine dated 8/8/2023 - Advanced lumbar spine degenerative change at L4-L5 and L5-S1 with mild S shaped scoliosis of the thoracolumbar spine.    Radiographs of the bilateral hips dated 8/8/2023 - Mild bilateral hip osteoarthritis with enthesophytes at the left greater trochanter.    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  Hip pain is likely due to acute exacerbation of chronic right hip osteoarthritis and her lumbar pain is likely related to lumbar facet arthrosis and myofascial pain.  -We discussed the above in detail.  -We discussed that the mainstay of treatment is physical therapy and she was given a referral today to focus on core strengthening and a directional preference program as well as hip girdle strengthening.  -We also discussed the pros, cons, risks, benefits, preprocedure postprocedure protocol and ultrasound-guided right hip joint corticosteroid injection.  She wished to proceed with repeat injection.  Please see procedure note section for complete details.  -We also discussed the importance of continued weight loss.  -If she wishes to pursue injections for her low back, we would need to refer her to medical spine.  Previously, her back pain improved with a hip injection, so we are hoping the same thing happens.  -Follow-up with me in 3 months, or  sooner if needed.    The patient was counseled to remain active, but avoid activities that worsen symptoms. The patient was in agreement with this plan. All questions were answered to the best of my ability.    PATIENT EDUCATION:  Education was discussed at today's appointment. A learning needs assessment was performed.    Primary learner: Lara Morris  Barriers to learning: None  Preferred language: English  Learning preferences include: Seeing and doing.  Discussed: Diagnosis and treatment plan.  Demonstrated: Understanding of material discussed.  Patient education materials given: None.  Learner response: Learner demonstrated understanding.    This note was dictated using Dragon speech recognition software and was not corrected for spelling or grammatical errors.    Patient seen and examined with PM&R resident, Dr. Ford. History, physical examination, pertinent imaging findings and the plan of care were discussed and I performed the key portions of the history, physical examination, and discussion of the plan of care. I have edited her note and agree with the findings. Dr. Ford performed the procedure under my direct supervision. I was present for the entire procedure.      Radha Packer MD    [facility] Asp/Inj: R hip joint on 9/25/2024 7:57 AM  Details: ultrasound-guided  Medications: 40 mg methylPREDNISolone acetate 40 mg/mL; 2 mL lidocaine PF 10 mg/mL (1 %); 3 mL ropivacaine 5 mg/mL (0.5 %)    Pre-Procedure Diagnosis: right hip pain, right hip osteoarthritis  Post-Procedure Diagnosis: right hip pain, right hip osteoarthritis    Procedure: US-guided right intraarticular hip corticosteroid injection    History of Present Illness: Lara Morris is a pleasant 64 y.o. female with hip pain secondary to hip arthritis who is here for the above procedure for improved pain control.      Medications and allergies were reviewed with the patient. No  contraindications were identified.    Informed Consent  Following denial of allergy and review of potential side effects and complications including but not necessarily limited to infection, allergic reaction, local tissue breakdown, systemic effects of corticosteroids, elevation of blood glucose, injury to soft tissue and/or nerves and seizure, the patient indicated understanding and agreed to proceed. Written consent to treatment was obtained and the patient verbalized consent for the procedure.    Procedural Details  The use of direct ultrasound visualization of the needle (rather than a non-guided injection) was required to increase patient safety by excluding inadvertent intramuscular or intratendinous placement and minimizing bleeding by avoiding osteochondral or vascular injury from the needle.  Additionally, the increased accuracy of placement may increase clinical effectiveness and will allow higher diagnostic specificity when evaluating effectiveness of this injection.    Using ultrasound, a pre-scan of the region was performed to identify the target structure.     The area was prepped with chlorhexidine, then re-examined using the same transducer, a sterile ultrasound transducer cover, and sterile ultrasound gel.    Procedural pause conducted to verify:  correct patient identity, procedure to be performed, and as applicable, correct side and site, correct patient position, and availability of implants, special equipment, or special requirements    Procedure:  Transducer: Curvilinear array transducer.  Patient position: Supine with the hip in a neutral position.  Localization process: With the transducer in an anatomic transverse oblique plane, the anterior hip joint was localized in a short axis view and the femoral head/neck junction was localized in a long axis view.   Local anesthesia: Local anesthesia was obtained with 1.5 cc of 1% lidocaine.  Needle: A 25-gauge, 2-inch needle was used for local  anesthesia and a 22-gauge, 3.5-inch needle was used for the injectate.  Approach: An inferolateral to superomedial, in plane, approach was used to guide the needle tip to the anterior joint recess at the head/neck junction. Os was contacted.   Injection/Aspiration: A mixture of 3 cc of 0.5% ropivocaine and 1 cc of DepoMedrol (40mg/mL) was injected into the right hip joint without complication. Good flow was observed within the anterior joint recess.    Post-procedural care: The patient tolerated the procedure well and reported complete pain relief during the anesthetic phase. The patient was asked to ice for improved pain control and avoid submerging the area in water for the next 48 hours to help reduce the risk of infection. The patient was instructed to call the office immediately if there are any questions or concerns.      PATIENT EDUCATION:  Education of the diagnosis and treatment plan was discussed at today's appointment. A learning needs assessment was performed. The patient demonstrated understanding.

## 2024-10-01 ENCOUNTER — DOCUMENTATION (OUTPATIENT)
Dept: INFUSION THERAPY | Age: 64
End: 2024-10-01
Payer: COMMERCIAL

## 2024-10-01 ENCOUNTER — HOME INFUSION (OUTPATIENT)
Dept: INFUSION THERAPY | Age: 64
End: 2024-10-01
Payer: COMMERCIAL

## 2024-10-01 NOTE — PROGRESS NOTES
Spoke with patient. She is now requesting  on Friday 10/4 before 11:30 am.     Sending drug, standard supplies for 4 doses of Hizentra delivered subcutaneously  via [facility] [employer] .  Patient states she would like to stay with faster  tubing (incl spare) and two each of 6mm and 9mm needle sets.     Patient had no questions for pharmacist.

## 2024-10-01 NOTE — H&P
History Of Present Illness  Lara Morris is a 64 y.o. female presenting with h/o tubular adenoma.     Past Medical History  Past Medical History:   Diagnosis Date    Asthma ([facility])     Diaphragmatic stimulation by cardiac pacemaker     Hyperlipidemia     Hypothyroid     Incisional hernia without obstruction or gangrene 01/22/2019    Incisional hernia, without obstruction or gangrene    Noninfective gastroenteritis and colitis, unspecified 04/22/2019    Ileitis    Personal history of other diseases of the circulatory system 01/06/2021    History of hypertension    Personal history of other diseases of the digestive system 06/07/2018    History of umbilical hernia    Personal history of other diseases of the nervous system and sense organs     History of peripheral neuropathy    Thyrotoxicosis, unspecified without thyrotoxic crisis or storm 01/25/2014    Hyperthyroidism     Surgical History  Past Surgical History:   Procedure Laterality Date    COLONOSCOPY  11/04/2013    Complete Colonoscopy    GALLBLADDER SURGERY  11/03/2016    Gallbladder Surgery    HERNIA REPAIR  12/13/2016    Inguinal Hernia Repair    HYSTERECTOMY  11/03/2016    Hysterectomy    THYROID SURGERY  11/03/2016    Thyroid Surgery    TONSILLECTOMY  11/03/2016    Tonsillectomy With Adenoidectomy    TOTAL KNEE ARTHROPLASTY  06/07/2020    Total Knee Arthroplasty    VENTRAL HERNIA REPAIR  09/17/2018    Ventral Hernia Repair     Social History  She reports that she has quit smoking. Her smoking use included cigarettes. She has been exposed to tobacco smoke. She has never used smokeless tobacco. She reports that she does not drink alcohol and does not use drugs.    Family History  Family History   Problem Relation Name Age of Onset    Multiple sclerosis Mother      Coronary artery disease Father      Diabetes Father      Hypertension Father      Cystic fibrosis Sister          with gi manifestations    Cystic fibrosis Brother      Cystic fibrosis Son       Cystic fibrosis Other paternal relatives         Allergies  Allergies   Allergen Reactions    Adhesive Hives     All adhesives (including clonidine patches) caused blistering hives    Gamunex-C [Immune Globul G-Gly-Iga Avg 46] Anaphylaxis    Immune Globulin Hives and Shortness of breath    Immune Globulin,Gamma (Igg) Human Hives and Shortness of breath    Animal Dander Runny nose     Sneezing    Atorvastatin Myalgia    Codeine Nausea/vomiting    House Dust Runny nose     Sneezing    Ropinirole Hives    Tramadol Other     Nausea only    Ciprofloxacin Hives and Rash    Erythromycin Hives and Rash     Review of Systems     Physical Exam  Vitals and nursing note reviewed.   Constitutional:       Appearance: Normal appearance.   Cardiovascular:      Rate and Rhythm: Normal rate and regular rhythm.      Pulses: Normal pulses.      Heart sounds: Normal heart sounds.   Pulmonary:      Effort: Pulmonary effort is normal.      Breath sounds: Normal breath sounds.   Abdominal:      General: Abdomen is flat.      Palpations: Abdomen is soft.   Neurological:      Mental Status: She is alert.          Last Recorded Vitals  There were no vitals taken for this visit.    Assessment/Plan   Colon polyps    Proceed with colonoscopy     Clyde Swain MD

## 2024-10-01 NOTE — PROGRESS NOTES
Review of chart - pt with order for Hizentra 24gm weekly. New order valid for 6 total fills thru 6/4/25.  This is fill 5/6 on current order  Auth good thru 7/2025  Oct FOTM verified.   Pt has been set up with copay assistance program.     Pt contacted, requesting to  medication 10/2 by 1130 am     Pharmacy to dispense the following 10/1 for  10/2:  8x Hizentra 10gm/50mL vials  4x Hizentra 4gm/20mL vials  DOS 10/2-10/29     Next delivery due ~10/29     FU 10/22 check next delivery date, check supply needs with patient

## 2024-10-02 ENCOUNTER — SPECIALTY PHARMACY (OUTPATIENT)
Dept: PHARMACY | Facility: CLINIC | Age: 64
End: 2024-10-02

## 2024-10-02 ENCOUNTER — ANESTHESIA (OUTPATIENT)
Dept: GASTROENTEROLOGY | Facility: HOSPITAL | Age: 64
End: 2024-10-02
Payer: COMMERCIAL

## 2024-10-02 ENCOUNTER — ANESTHESIA EVENT (OUTPATIENT)
Dept: GASTROENTEROLOGY | Facility: HOSPITAL | Age: 64
End: 2024-10-02
Payer: COMMERCIAL

## 2024-10-02 ENCOUNTER — HOSPITAL ENCOUNTER (OUTPATIENT)
Dept: GASTROENTEROLOGY | Facility: HOSPITAL | Age: 64
Discharge: HOME | End: 2024-10-02
Payer: COMMERCIAL

## 2024-10-02 VITALS
DIASTOLIC BLOOD PRESSURE: 68 MMHG | HEIGHT: 68 IN | TEMPERATURE: 96.8 F | WEIGHT: 234.13 LBS | BODY MASS INDEX: 35.48 KG/M2 | SYSTOLIC BLOOD PRESSURE: 119 MMHG | RESPIRATION RATE: 16 BRPM | OXYGEN SATURATION: 100 % | HEART RATE: 63 BPM

## 2024-10-02 DIAGNOSIS — Z86.0101 ENCOUNTER FOR COLONOSCOPY DUE TO HISTORY OF ADENOMATOUS COLONIC POLYPS: Primary | ICD-10-CM

## 2024-10-02 DIAGNOSIS — Z12.11 SCREENING FOR COLORECTAL CANCER: ICD-10-CM

## 2024-10-02 DIAGNOSIS — Z12.11 ENCOUNTER FOR COLONOSCOPY DUE TO HISTORY OF ADENOMATOUS COLONIC POLYPS: Primary | ICD-10-CM

## 2024-10-02 DIAGNOSIS — Z12.12 SCREENING FOR COLORECTAL CANCER: ICD-10-CM

## 2024-10-02 PROBLEM — D84.9 IMMUNOCOMPROMISED: Status: ACTIVE | Noted: 2024-10-02

## 2024-10-02 PROBLEM — R11.2 PONV (POSTOPERATIVE NAUSEA AND VOMITING): Status: ACTIVE | Noted: 2024-10-02

## 2024-10-02 PROBLEM — Z98.890 PONV (POSTOPERATIVE NAUSEA AND VOMITING): Status: ACTIVE | Noted: 2024-10-02

## 2024-10-02 LAB
GLUCOSE BLD MANUAL STRIP-MCNC: 82 MG/DL (ref 74–99)
GLUCOSE BLD MANUAL STRIP-MCNC: 86 MG/DL (ref 74–99)

## 2024-10-02 PROCEDURE — 7100000009 HC PHASE TWO TIME - INITIAL BASE CHARGE

## 2024-10-02 PROCEDURE — 82947 ASSAY GLUCOSE BLOOD QUANT: CPT

## 2024-10-02 PROCEDURE — 3700000002 HC GENERAL ANESTHESIA TIME - EACH INCREMENTAL 1 MINUTE

## 2024-10-02 PROCEDURE — 45385 COLONOSCOPY W/LESION REMOVAL: CPT | Performed by: INTERNAL MEDICINE

## 2024-10-02 PROCEDURE — A45385 PR COLONOSCOPY,REMV LESN,SNARE: Performed by: NURSE ANESTHETIST, CERTIFIED REGISTERED

## 2024-10-02 PROCEDURE — 3700000001 HC GENERAL ANESTHESIA TIME - INITIAL BASE CHARGE

## 2024-10-02 PROCEDURE — RXMED WILLOW AMBULATORY MEDICATION CHARGE

## 2024-10-02 PROCEDURE — 7100000010 HC PHASE TWO TIME - EACH INCREMENTAL 1 MINUTE

## 2024-10-02 PROCEDURE — A45385 PR COLONOSCOPY,REMV LESN,SNARE: Performed by: ANESTHESIOLOGY

## 2024-10-02 PROCEDURE — 2500000004 HC RX 250 GENERAL PHARMACY W/ HCPCS (ALT 636 FOR OP/ED): Performed by: INTERNAL MEDICINE

## 2024-10-02 PROCEDURE — 2500000004 HC RX 250 GENERAL PHARMACY W/ HCPCS (ALT 636 FOR OP/ED)

## 2024-10-02 RX ORDER — PROPOFOL 10 MG/ML
INJECTION, EMULSION INTRAVENOUS AS NEEDED
Status: DISCONTINUED | OUTPATIENT
Start: 2024-10-02 | End: 2024-10-02

## 2024-10-02 RX ORDER — MIDAZOLAM HYDROCHLORIDE 1 MG/ML
INJECTION INTRAMUSCULAR; INTRAVENOUS AS NEEDED
Status: DISCONTINUED | OUTPATIENT
Start: 2024-10-02 | End: 2024-10-02

## 2024-10-02 RX ORDER — SODIUM CHLORIDE, SODIUM LACTATE, POTASSIUM CHLORIDE, CALCIUM CHLORIDE 600; 310; 30; 20 MG/100ML; MG/100ML; MG/100ML; MG/100ML
20 INJECTION, SOLUTION INTRAVENOUS CONTINUOUS
Status: DISCONTINUED | OUTPATIENT
Start: 2024-10-02 | End: 2024-10-03 | Stop reason: HOSPADM

## 2024-10-02 RX ORDER — LIDOCAINE HYDROCHLORIDE 20 MG/ML
INJECTION, SOLUTION EPIDURAL; INFILTRATION; INTRACAUDAL; PERINEURAL AS NEEDED
Status: DISCONTINUED | OUTPATIENT
Start: 2024-10-02 | End: 2024-10-02

## 2024-10-02 SDOH — HEALTH STABILITY: MENTAL HEALTH: CURRENT SMOKER: 0

## 2024-10-02 ASSESSMENT — COLUMBIA-SUICIDE SEVERITY RATING SCALE - C-SSRS
2. HAVE YOU ACTUALLY HAD ANY THOUGHTS OF KILLING YOURSELF?: NO
6. HAVE YOU EVER DONE ANYTHING, STARTED TO DO ANYTHING, OR PREPARED TO DO ANYTHING TO END YOUR LIFE?: NO
1. IN THE PAST MONTH, HAVE YOU WISHED YOU WERE DEAD OR WISHED YOU COULD GO TO SLEEP AND NOT WAKE UP?: NO

## 2024-10-02 ASSESSMENT — PAIN SCALES - GENERAL
PAINLEVEL_OUTOF10: 0 - NO PAIN

## 2024-10-02 ASSESSMENT — PAIN - FUNCTIONAL ASSESSMENT
PAIN_FUNCTIONAL_ASSESSMENT: 0-10

## 2024-10-02 NOTE — DISCHARGE INSTRUCTIONS

## 2024-10-02 NOTE — ANESTHESIA PREPROCEDURE EVALUATION
Patient: Lara Morris    Procedure Information       Date/Time: 10/02/24 0830    Scheduled providers: Clyde Swain MD; Radames Nair MD; AISHWARYA Gerard; Jyae Mujica RN; Patrizia Branham, RN; Ector Gonzalez, RN    Procedure: COLONOSCOPY    Location: 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   (+) Hemifacial spasm of left side of face      GI   (+) GERD (gastroesophageal reflux disease)      Endocrine   (+) Class 2 severe obesity due to excess calories with serious comorbidity and body mass index (BMI) of 38.0 to 38.9 in adult   (+) Diabetes mellitus type 2 without retinopathy (Multi)   (+) Hyperthyroidism   (+) Hypothyroidism due to Hashimoto's thyroiditis      Musculoskeletal   (+) Osteoarthritis of knee   (+) Primary localized osteoarthrosis of ankle and foot      HEENT   (+) Bilateral sensorineural hearing loss   (+) Chronic sinusitis      ID  Common variable immunodeficiency.   (+) Immunocompromised       Clinical information reviewed:   Tobacco  Allergies    Med Hx  Surg Hx  OB Status  Fam Hx  Soc Hx        NPO Detail:  NPO/Void Status  Carbohydrate Drink Given Prior to Surgery? : N  Date of Last Liquid: 10/02/24  Time of Last Liquid: 0230  Date of Last Solid: 09/30/24  Time of Last Solid: 1900  Last Intake Type: Clear fluids; GI prep  Time of Last Void: 0751         Physical Exam    Airway  Mallampati: II  TM distance: >3 FB  Neck ROM: full     Cardiovascular    Dental - normal exam     Pulmonary    Abdominal        TTE results 3/2024  1. Left ventricular systolic function is normal with a 60-65% estimated ejection fraction.   2. Spectral Doppler shows  an impaired relaxation pattern of left ventricular diastolic filling.   3. There is no evidence of left ventricular hypertrophy.   4. Mild aortic valve stenosis.   5. The pulmonary artery is not well visualized.    Anesthesia Plan    History of general anesthesia?: yes  History of complications of general anesthesia?: yes    ASA 3     MAC     The patient is not a current smoker.  Patient did not smoke on day of procedure.    intravenous induction   Postoperative administration of opioids is intended.  Anesthetic plan and risks discussed with patient.    Plan discussed with CRNA and CAA.    Risks, benefits, and alternatives of monitored anesthesia care with deep sedation were explained in detail to the patient.  Specifically, the patient was told that MAC with sedation is not general anesthesia, and that memory of intraprocedural events may be possible despite the sedation drugs administered.  I explained to the patient that the role of the anesthesia team is to monitor and adjust the patient's level of sedation to provide the maximum level of comfort with keeping safety as the highest priority, but that general anesthesia with complete amnesia is not a realistic expectation.  Conversion to general anesthesia if required was also described should the procedure conditions warrant.  The patient indicated understanding and agreed to proceed.    Radames Nair MD

## 2024-10-02 NOTE — ANESTHESIA POSTPROCEDURE EVALUATION
Patient: Lara Morris    Procedure Summary       Date: 10/02/24 Room / Location: [facility]    Anesthesia Start: 0808 Anesthesia Stop: 0857    Procedure: COLONOSCOPY Diagnosis: Encounter for colonoscopy due to history of adenomatous colonic polyps    Scheduled Providers: Clyde Swain MD; Radames Nair MD; AISHWARYA Gerard; Jaye Mujica, RN; Patrizia Branham, RN; Ector Gonzalez, RN Responsible Provider: Radames Nair MD    Anesthesia Type: MAC ASA Status: 3            Anesthesia Type: MAC    Vitals Value Taken Time   /66 10/02/24 0913   Temp 36 °C (96.8 °F) 10/02/24 0854   Pulse 67 10/02/24 0919   Resp 18 10/02/24 0909   SpO2 100 % 10/02/24 0919   Vitals shown include unfiled device data.    Anesthesia Post Evaluation    Patient location during evaluation: bedside  Patient participation: complete - patient participated  Level of consciousness: sleepy but conscious  Pain management: adequate  Airway patency: patent  Cardiovascular status: acceptable  Respiratory status: acceptable  Hydration status: acceptable  Postoperative Nausea and Vomiting: none    There were no known notable events for this encounter.

## 2024-10-03 ASSESSMENT — PAIN SCALES - GENERAL: PAINLEVEL_OUTOF10: 0 - NO PAIN

## 2024-10-04 NOTE — PROGRESS NOTES
[facility]  Lara Morris female   1960 64 y.o.   [id number]      Chief Complaint  New patient, left breast pain and nipple discharge.    History Of Present Illness  Lara Morris is a very pleasant 64 y.o.  female presenting to the breast center with left breast pain. The pain was for 3-4 weeks and was a sharp/stabbing pain which subsided into a dull ache. It has improved. She has been putting her diaphragm pacer box in her bra. She denies injury but does lift objects. She drinks caffeine, does not smoke, and does wear a wireless bra. She recently had clear nipple discharge which was self expressed. She denies bloody nipple discharge, fever, or chills. She denies breast surgery or biopsy. She has no family history of breast cancer.     BREAST IMAGIN2024 Left ultrasound, indicates BI-RADS Category 2. No sonographic abnormality at the site of focal pain, 2 o'clock position left breast. Independent clinical evaluation recommended. Mild benign ductal ectasia, subareolar left breast.      REPRODUCTIVE HISTORY: menarche age 7, , first birth age 19,  x 9 months, OCP's x 10-20 years, menopause age unknown s/p partially hysterectomy age 48 with intact ovaries, no HRT, scattered fibroglandular tissue    FAMILY CANCER HISTORY:   none     Review of Systems  Constitutional:  Negative for appetite change, fatigue, fever and unexpected weight change.   HENT:  Negative for ear pain, hearing loss, nosebleeds, sore throat and trouble swallowing.    Eyes:  Negative for discharge, itching and visual disturbance.   Breast: As stated in HPI.  Respiratory:  Negative for cough, chest tightness and shortness of breath.    Cardiovascular:  Negative for chest pain, palpitations and leg swelling.   Gastrointestinal:  Negative for abdominal pain, constipation, diarrhea and nausea.   Endocrine: Negative for cold intolerance and heat intolerance.   Genitourinary:  Negative for dysuria, frequency,  hematuria, pelvic pain and vaginal bleeding.   Musculoskeletal:  Negative for arthralgias, back pain, gait problem, joint swelling and myalgias.   Skin:  Negative for color change and rash.   Allergic/Immunologic: Negative for environmental allergies and food allergies.   Neurological:  Negative for dizziness, tremors, speech difficulty, weakness, numbness and headaches.   Hematological:  Does not bruise/bleed easily.   Psychiatric/Behavioral:  Negative for agitation, dysphoric mood and sleep disturbance. The patient is not nervous/anxious.       Past Medical History  She has a past medical history of Asthma, Diaphragmatic stimulation by cardiac pacemaker, Hyperlipidemia, Hypothyroid, Incisional hernia without obstruction or gangrene (01/22/2019), Noninfective gastroenteritis and colitis, unspecified (04/22/2019), Personal history of other diseases of the circulatory system (01/06/2021), Personal history of other diseases of the digestive system (06/07/2018), Personal history of other diseases of the nervous system and sense organs, and Thyrotoxicosis, unspecified without thyrotoxic crisis or storm (01/25/2014).    Surgical History  She has a past surgical history that includes Colonoscopy (11/04/2013); Tonsillectomy (11/03/2016); Thyroid surgery (11/03/2016); Gallbladder surgery (11/03/2016); Hysterectomy (11/03/2016); Total knee arthroplasty (06/07/2020); Hernia repair (12/13/2016); and Ventral hernia repair (09/17/2018).    Family History  Cancer-related family history is not on file.     Social History  She reports that she has quit smoking. Her smoking use included cigarettes. She has been exposed to tobacco smoke. She has never used smokeless tobacco. She reports that she does not drink alcohol and does not use drugs.    Allergies  Adhesive; Gamunex-c [immune globul g-gly-iga avg 46]; Immune globulin; Immune globulin,gamma (igg) human; Animal dander; Atorvastatin; Codeine; House dust; Ropinirole; Tramadol;  Ciprofloxacin; and Erythromycin    Medications  Current Outpatient Medications   Medication Instructions    acetaminophen (TYLENOL) 650 mg, oral, Every 6 hours PRN    acyclovir-hydrocortisone 5-1 % cream Xerese 5-1 % External Cream Quantity: 5 Refills: 0 DO Start : 22-Mar-2018 Active    albuterol 90 mcg/actuation inhaler Inhale 2 puff by mouth every 4 hours as needed Inhalation    albuterol 90 mcg/actuation inhaler Inhale 2 puffs every 4 hours as needed    azelastine (Astelin) 137 mcg (0.1 %) nasal spray USE 1-2 SPRAYS IN EACH NOSTRIL ONCE DAILY TO TWO TIMES A DAY AS NEEDED FOR NASAL DRAINAGE    blood sugar diagnostic (Accu-Chek Zakiya Plus test strp) strip Use one strip 3 times daily as directed    calcium citrate (Calcitrate) 200 mg (950 mg) tablet 1 tablet, oral, Daily    cetirizine (ZyrTEC) 10 mg tablet 1 tablet, oral, 2 times daily    cholecalciferol (VITAMIN D-3) 25 mcg, oral, Daily    clobetasol (Temovate) 0.05 % cream Apply 1 application twice daily as needed for rash.    clobetasol (Temovate) 0.05 % ointment Apply 1 application 2 times daily for 2 weeks and then as needed for 2 weeks.    cyanocobalamin (Vitamin B-12) 1,000 mcg/mL injection Inject 1 mL into the muscle every 3 weeks.    cyanocobalamin (Vitamin B-12) 1,000 mcg/mL injection Inject 1 mL (1000 mcg) into the muscle every 3 weeks    eletriptan (Relpax) 40 mg tablet Take 1 tablet at onset of headache. May repeat in 2 hours one time.    epinastine (Elestat) 0.05 % ophthalmic solution Instill 1 drop into affected eye twice daily as needed    EPINEPHrine (EpiPen 2-Korey) 0.3 mg/0.3 mL injection syringe Use intramuscularly as needed for allergic reaction.    EPINEPHrine (Epipen) 0.3 mg/0.3 mL injection syringe USE AS NEEDED AS DIRECTED    esomeprazole (NexIUM) 40 mg DR capsule TAKE 1 CAPSULE BY MOUTH EVERY MORNING BEFORE MEALS    ezetimibe (ZETIA) 10 mg, oral, Daily    ferrous fumarate-vitamin C (Romulo-Sequeles 65-25) 1 tablet, oral, 3 times daily  "(morning, midday, late afternoon), Do not crush, chew, or split.    fluticasone (Flonase) 50 mcg/actuation nasal spray 2 sprays, Each Nostril, Daily    fluticasone-umeclidin-vilanter (TRELEGY-ELLIPTA) 200-62.5-25 mcg blister with device INHALE 1 PUFF BY MOUTH ONCE DAILY    gabapentin (NEURONTIN) 600 mg, oral, Nightly    ginseng 100 mg, oral, Daily    ibuprofen 800 mg, oral, 2 times daily PRN    immune globulin, human, (Hizentra) subcutaneous infusion 24 g, subcutaneous, Once Weekly    ipratropium-albuteroL (Duo-Neb) 0.5-2.5 mg/3 mL nebulizer solution Take 3 mL by nebulization 4 times a day as needed for shortness of breath or wheezing.    levothyroxine (Synthroid, Levoxyl) 150 mcg tablet TAKE 1 TABLET BY MOUTH ONCE DAILY    lidocaine-prilocaine (Emla) 2.5-2.5 % cream Apply 1 application once a day as needed    mepolizumab (Nucala) 100 mg/mL auto-injector Inject 100 mg (1 pen) under the skin every 4 weeks as directed    montelukast (Singulair) 10 mg tablet TAKE 1 TABLET BY MOUTH EVERY DAY    montelukast (Singulair) 10 mg tablet TAKE 1 TABLET BY MOUTH EVERY DAY    Mounjaro 12.5 mg, subcutaneous, Once Weekly    MULTIVITAMIN ORAL 1 tablet, oral, Daily    omega 3-dha-epa-fish oil (Fish OiL) 1,200 (144-216) mg capsule 1 capsule, oral, Daily    omeprazole (PRILOSEC) 40 mg, oral, Daily before breakfast, Do not crush or chew.    onabotulinumtoxinA (Botox) 100 unit injection PHYSICIAN TO INJECT UP  UNITS INTRAMUSCULARLY EVERY 90 DAYS. FOR OFFICE USE ONLY    pen needle, diabetic (BD Cici 2nd Gen Pen Needle) 32 gauge x 5/32\" needle Use 1 pen needle to inject insulin under the skin 3 times a day before meals    potassium &magnesium aspartate (magnesium, potassium aspartate) 250-250 mg capsule 2 capsules, oral, Daily    predniSONE (Deltasone) 10 mg tablet Take 4 tabs by mouth daily for 2 days, then 3 tabs daily for 2 days, then 2 tabs daily for 2 days, then 1 tab daily for 2 days    RESVERATROL ORAL 1,450 mg, oral, Daily    " "rizatriptan (Maxalt) 10 mg tablet TAKE 1 TABLET BY MOUTH AT BEGINNING OF MIGRAINE. TAKE 1 BY MOUTH EVERY 2 HOURS IF MIGRAINE NOT RESOLVED. MAXIMUM OF 3 TABLET BY MOUTH IN 24 HOURS    sulfamethoxazole-trimethoprim (Bactrim DS) 800-160 mg tablet Take 1 tablet(s) by mouth once daily    syringe with needle (BD Luer-Britni Syringe) 3 mL 25 x 5/8\" syringe Use as directed for B12 injections    syringe, disposable, (Monoject Tuberculin Syringe) 1 mL syringe use 2 syringe(s) once a month    turmeric 400 mg capsule oral       Last Recorded Vitals  Vitals:    10/05/24 1211   BP: 128/83   Pulse: 87        Physical Exam  Patient is alert and oriented x3 and in a relaxed and appropriate mood. Her gait is steady and hand grasps are equal. Sclera is clear. The breasts are nearly symmetrical. The tissue is soft without palpable abnormalities, discrete nodules or masses. The skin and nipples appear normal. There is no cervical, supraclavicular or axillary lymphadenopathy. Heart rate and rhythm normal with murmur. The lungs are clear to auscultation bilaterally.       Relevant Results and Imaging  BI mammo bilateral screening tomosynthesis 03/28/2024    Narrative  Interpreted By:  Mary Gomes,  STUDY:  BI MAMMO BILATERAL SCREENING TOMOSYNTHESIS;  3/28/2024 4:43 pm    ACCESSION NUMBER(S):  [id number]    ORDERING CLINICIAN:  JANEY ROQUE    INDICATION:  Screening.    COMPARISON:  All prior mammograms that are available at the time of interpretation.    FINDINGS:  2D and tomosynthesis images were reviewed at 1 mm slice thickness.    Density:  There are areas of scattered fibroglandular tissue.    No suspicious masses or calcifications are identified.    Impression  No mammographic evidence of malignancy.    Based on the Tyrer-Cuzick model for breast cancer risk assessment,  the patient's lifetime risk of breast cancer is 7.2%. Patients with  over a 20% lifetime risk of developing breast cancer may benefit from  additional " screening with breast MRI or ultrasound. Please note that  this estimate is based on responses provided on the patient  questionnaire. For more information regarding high risk consultation,  please call [phone].    BI-RADS CATEGORY:  BI-RADS Category:  1 Negative.  Recommendation:  Annual Screening.  Recommended Date:  1 Year.  Laterality:  Bilateral.    For any future breast imaging appointments, please call [phone] ([phone]).      MACRO:  None    Signed by: Mary Gomes 4/2/2024 4:45 PM  Dictation workstation:   [id number]      Study Result    Narrative & Impression   Interpreted By:  Patrizia Haley,   STUDY:  BI US BREAST LIMITED LEFT;  9/17/2024 9:53 am      ACCESSION NUMBER(S):  [id number]      ORDERING CLINICIAN:  HARLEEN VILLATORO      INDICATION:  Two-week history of sharp stabbing pain at the 2 o'clock position of  the left breast which has subsided into a dull ache. One-week history  of non spontaneous nipple discharge.      ,N64.4 Mastodynia      COMPARISON:  Bilateral mammogram 03/28/2024, 12/19/2020 2, 09/01/2020      FINDINGS:  ULTRASOUND: Targeted ultrasound was performed of the subareolar left  breast and upper-outer left breast, inclusive of the area of focal  pain at the 2 o'clock position by a registered sonographer with  elastography.      Normal fibroglandular tissues visualized throughout the upper outer  left breast, with no focal mass or shadowing in the area of focal  pain, 2 o'clock position.      Sonographic scanning of the subareolar left breast demonstrates  minimal amount of anechoic fluid within subareolar left breast ducts  which gradually taper. No intraductal masses. No abnormal Doppler  blood flow. The ducts are soft on elastography.      IMPRESSION:  No sonographic abnormality at the site of focal pain, 2 o'clock  position left breast. Independent clinical evaluation recommended.      Mild benign ductal ectasia, subareolar left breast.      BI-RADS CATEGORY:  BI-RADS  Category:  2 Benign.  Recommendation:  Clinical follow-up.  Recommended Date:  Immediate.  Laterality:  Left.      For any future breast imaging appointments, please call [phone]  ([phone]).          MACRO:  None      Signed by: Patrizia Haley 9/17/2024 9:58 AM     I explained the results in depth, along with suggested explanation for follow up recommendations based on the testing results. BI-RADS Category 1      Visit Diagnosis  1. Nipple discharge        2. Breast pain, left  Referral to Breast Surgery          Assessment/Plan  Normal clinical exam and imaging, left breast pain, left nipple discharge, no breast surgery or biopsy, no family history of breast cancer, scattered fibroglandular tissue    Plan:  Return to PCP for annual mammograms and exams. Breast pain education given and encouraged to try home remedies. Encouraged not to self express nipple discharge.     Patient Discussion/Summary  I am sorry you are experiencing breast pain. Breast pain is common in women of all ages. Be reassured most breast pain resolves without intervention and breast cancer usually does not cause breast pain. Your breast imaging and exam are normal.    Below is a list of interventions to help reduce or manage the painful symptoms:  Reduce or eliminate daily caffeine intake especially in the form of coffee, tea, chocolate, carbonated sodas and energy drinks  Reduce dietary fat intake to less than 15% of total calories or approximately 50g per day  Evening primrose oil as an over the counter supplement may be helpful. It is an antioxidant. Take 3g orally per day. It may take 3-4 months to notice a difference  If you are a smoker, stop smoking. You can call 1-848-QUIT-NOW for the [place] tobacco quit line support. If you do not smoke, but are exposed to smokers, avoid secondhand smoke  Wear a proper fitting and supportive bra without wire  Compresses may be helpful. Apply warm compresses, ice packs or gentle massage  Medical Therapy:  Over the counter Acetaminophen or a nonsteroidal anti-inflammatory drug such as Ibuprofen can be helpful as needed      You can see your health information, review clinical summaries from office visits & test results online when you follow your health with MY  Chart, a personal health record. To sign up go to [email or web address]. If you need assistance with signing up or trouble getting into your account call [facility] Patient Line 24/7 at [phone].    My office phone number is [phone] if you need to get in touch with me or have additional questions or concerns. Thank you for choosing [facility] and trusting me as your healthcare provider. I look forward to seeing you again at your next office visit. I am honored to be a provider on your health care team and I remain dedicated to helping you achieve your health goals.    Jane Eisenberg, SHER-CNP

## 2024-10-05 ENCOUNTER — OFFICE VISIT (OUTPATIENT)
Dept: SURGICAL ONCOLOGY | Facility: CLINIC | Age: 64
End: 2024-10-05
Payer: COMMERCIAL

## 2024-10-05 ENCOUNTER — PHARMACY VISIT (OUTPATIENT)
Dept: PHARMACY | Facility: CLINIC | Age: 64
End: 2024-10-05
Payer: COMMERCIAL

## 2024-10-05 VITALS — SYSTOLIC BLOOD PRESSURE: 128 MMHG | HEART RATE: 87 BPM | DIASTOLIC BLOOD PRESSURE: 83 MMHG

## 2024-10-05 DIAGNOSIS — N64.4 BREAST PAIN, LEFT: ICD-10-CM

## 2024-10-05 DIAGNOSIS — N64.52 NIPPLE DISCHARGE: Primary | ICD-10-CM

## 2024-10-05 PROCEDURE — 99213 OFFICE O/P EST LOW 20 MIN: CPT | Performed by: NURSE PRACTITIONER

## 2024-10-07 PROCEDURE — RXMED WILLOW AMBULATORY MEDICATION CHARGE

## 2024-10-09 LAB
LABORATORY COMMENT REPORT: NORMAL
PATH REPORT.FINAL DX SPEC: NORMAL
PATH REPORT.GROSS SPEC: NORMAL
PATH REPORT.TOTAL CANCER: NORMAL

## 2024-10-11 ENCOUNTER — PHARMACY VISIT (OUTPATIENT)
Dept: PHARMACY | Facility: CLINIC | Age: 64
End: 2024-10-11
Payer: COMMERCIAL

## 2024-10-11 PROCEDURE — RXMED WILLOW AMBULATORY MEDICATION CHARGE

## 2024-10-14 ENCOUNTER — PHARMACY VISIT (OUTPATIENT)
Dept: PHARMACY | Facility: CLINIC | Age: 64
End: 2024-10-14
Payer: COMMERCIAL

## 2024-10-21 PROCEDURE — RXMED WILLOW AMBULATORY MEDICATION CHARGE

## 2024-10-21 NOTE — PROGRESS NOTES
Patient: Lara Morris    [id number]  : 1960 -- AGE 64 y.o.    Provider: Melani Wong MD PhD     Location [facility]   Service Date: 10/23/2024              [facility]  Followup Visit Note      HISTORY OF PRESENT ILLNESS     The patient's referring provider is: Chan Hernandez MD     HISTORY OF PRESENT ILLNESS   Lara Morris is a 64 y.o. female with past medical history significant for JOSÉ MIGUEL on CPAP, CVID, aortic stenosis, T2DM, asthma, right hemidiaphragm paralysis s/p b/l pacer on 23, CFS, GERD, hypothyroid who presents to a [facility] for followup.     PAST SLEEP HISTORY  Patient seen by me on 23. Patient reported that she has known JOSÉ MIGUEL for 8 years and has been on CPAP. She was referred to establish care for her JOSÉ MIGUEL. She has benefitted from CPAP with nasal pillows on CPAP at [facility]cwp. She thinks she had severe JOSÉ MIGUEL. On CPAP she does not snore, gasp, choke. Without CPAP she will have her leg jerks. She sleeps at about 30 degrees. Her DME was [facility]. She gets supplies now from [employer] she thinks.     She also reported RLS symptoms. She described them as a sense that she has to move her legs. If her legs are still there will spontaneously move on their own. mostly at night but now in the evenings. She massages her legs to help. If she walks they are removed she was on roprinole but had hives. Bother her every night and makes falling asleep difficult. She is currently on Neurontin at 300 mg for back pain that she takes at night just before going to bed. Symptoms start at 7:30-8pm.      Patient may have been exposed to RSV when her grandchildren were affected. She became sick but did not recover her breathing fully. Patient with right hemidiaphragm paralysis with SOB since 2023 (elevation present since at least ). Patient with pacer placed by Dr. Torres on 23.  With the diaphragm stimulator she has noted that she is  using it all the time. When she stops it or when she goes for her shower she does notice that she is more short of breath at that time. She will have to continue using this for the next 2 years is what she was informed.    A home sleep apnea test was done on 11/1/2023 at a BMI of 38.8.  This showed an FRANCESCA 3% of 17 events/hour and FRANCESCA 4% of 12 events/hour and a central apnea index of 0.1 events/hour.  There was no significant effect of body position.  She then had an in center PAP titration done on 11/21/2023 CPAP at 6 and 7 appeared to be effective as well as BPAP at 8/4.    Had referred her for iron infusions (Ferraheme) in January of 2024 given ferritin of 63 and iron saturation of 9%. She had 2 sessions. Per PCP notes felt more energy and had improvements in her RLS.    CURRENT HISTORY  At her last visit she had reported improvements in her RLS after an iron infusion in February 2024 and was able to stop the gabapentin. She had restarted it symptoms had recurred. She has a [place] 3G BPAP device with a nasal mask.  She was also continuing to use a diaphragm stimulator for her right hemidiaphragm paralysis which was helpful for her exercise tolerance.    Since she was seen she did have ferritin testing performed on 8/30/2024 which was at 250.  Her iron level was 66 with a percent saturation of 16% and TIBC of 409.    On today's visit, she reports that her RLS and sleep apnea are relatively unchanged.    From the standpoint of her sleep apnea she continues to use her BPAP device at settings of 8.5/4.  She obtain the new device back in December 2023 with [employer] as her [employer] company.  She took a picture of the report from the machine which indicates that her residual AHI is 0 events/hour and the 95th percentile was 17 L/min.    From the standpoint of her RLS she had the iron testing done.  She is taking an iron supplement.  As she does have breakthrough symptoms she is taking gabapentin at 600 mg nightly which helps.   She also occasionally will take [place]ands over-the-counter medication for leg cramps which also can be helpful.    Sleep schedule:    In bed: 11p-12A  Activities in bed:  Bedtime Activities: turns out the lights  Subjective sleep latency:  up to 60 minutes  Awakenings during night: 1-2  Length of awakenings: 30-60 min  Final awakening time: 6AM  Out of bed: 6A  Overall estimate of total sleep time: 5-6    Weekends: wakes at 7AM  Preferred sleeping position: supine and sidelying  Typically sleeps with her .       PAP Adherence:  DURABLE MEDICAL EQUIPMENT COMPANY: [employer]  Machine: THERAPY: BILEVEL PAP PRESSURE SETTINGS: 8.5 cm H2O / 4 cm H2O  Mask: MASK TYPE: NASAL MASK   Issues with therapy: ISSUES WITH THERAPY: none  Benefits with PAP: PERCEIVED BENEFITS OF PAP: better sleep quality      Daytime Symptoms  On awakening patient reports: more refreshed than in the past.    Daytime: During the day, she does not doze unintentionally while inactive.  During more active tasks, she never is drowsy.  With regards to daytime napping, the patient reports never taking naps.  She does not report that poor sleep results in mood-related irritability. She does not report that poor sleep results in issues with memory/concentration.    ESS: 3  CAL: 10  FOSQ: 35    REVIEW OF SYSTEMS     REVIEW OF SYSTEMS  Review of Systems   All other systems reviewed and are negative.      ALLERGIES AND MEDICATIONS     ALLERGIES  Allergies   Allergen Reactions    Adhesive Hives     All adhesives (including clonidine patches) caused blistering hives    Gamunex-C [Immune Globul G-Gly-Iga Avg 46] Anaphylaxis    Immune Globulin Hives and Shortness of breath    Immune Globulin,Gamma (Igg) Human Hives and Shortness of breath    Animal Dander Runny nose     Sneezing    Atorvastatin Myalgia    Codeine Nausea/vomiting    House Dust Runny nose     Sneezing    Ropinirole Hives    Tramadol Nausea/vomiting    Ciprofloxacin Hives and Rash    Erythromycin  Hives and Rash       MEDICATIONS: She has a current medication list which includes the following prescription(s): acetaminophen - Take 2 tablets (650 mg) by mouth every 6 hours if needed for mild pain (1 - 3), acyclovir-hydrocortisone - Xerese 5-1 % External Cream Quantity: 5 Refills: 0 DO Start : 22-Mar-2018 Active, albuterol - Inhale 2 puff by mouth every 4 hours as needed Inhalation, albuterol - Inhale 2 puffs every 4 hours as needed, azelastine - USE 1-2 SPRAYS IN EACH NOSTRIL ONCE DAILY TO TWO TIMES A DAY AS NEEDED FOR NASAL DRAINAGE, accu-chek francis plus test strp - Use one strip 3 times daily as directed, calcium citrate - Take 1 tablet (200 mg) by mouth once daily, cetirizine - Take 1 tablet (10 mg) by mouth 2 times a day, cholecalciferol - Take 1 tablet (25 mcg) by mouth once daily, clobetasol - Apply 1 application twice daily as needed for rash, clobetasol - Apply 1 application 2 times daily for 2 weeks and then as needed for 2 weeks, cyanocobalamin - Inject 1 mL into the muscle every 3 weeks, cyanocobalamin - Inject 1 mL (1000 mcg) into the muscle every 3 weeks, eletriptan - Take 1 tablet at onset of headache. May repeat in 2 hours one time, epinastine - Instill 1 drop into affected eye twice daily as needed, epipen 2-gordo - Use intramuscularly as needed for allergic reaction, ezetimibe - Take 1 tablet (10 mg) by mouth once daily, ferrous fumarate-vitamin c - Take 1 tablet by mouth 3 times daily (morning, midday, late afternoon). Do not crush, chew, or split, fluticasone - Administer 2 sprays into each nostril once daily, gabapentin - Take 2 capsules (600 mg) by mouth once daily at bedtime, ginseng - Take 100 mg by mouth once daily, ibuprofen - Take 1 tablet (800 mg) by mouth 2 times a day as needed for moderate pain (4 - 6), immune globulin (human) - Inject 120 mL (24 g) under the skin 1 (one) time per week, ipratropium-albuterol - Take 3 mL by nebulization 4 times a day as needed for shortness of breath  or wheezing, levothyroxine - TAKE 1 TABLET BY MOUTH ONCE DAILY, lidocaine-prilocaine - Apply 1 application once a day as needed, nucala - Inject 100 mg (1 pen) under the skin every 4 weeks as directed, montelukast - TAKE 1 TABLET BY MOUTH EVERY DAY, montelukast - TAKE 1 TABLET BY MOUTH EVERY DAY, multivitamin - Take 1 tablet by mouth once daily, omega 3-dha-epa-fish oil - Take 1 capsule (1,200 mg) by mouth once daily, omeprazole - Take 1 capsule (40 mg) by mouth once daily in the morning. Take before meals. Do not crush or chew, onabotulinumtoxina - PHYSICIAN TO INJECT UP  UNITS INTRAMUSCULARLY EVERY 90 DAYS. FOR OFFICE USE ONLY, pen needle, diabetic - Use 1 pen needle to inject insulin under the skin 3 times a day before meals, magnesium, potassium aspartate - Take 2 capsules by mouth once daily, prednisone - Take 4 tabs by mouth daily for 2 days, then 3 tabs daily for 2 days, then 2 tabs daily for 2 days, then 1 tab daily for 2 days, resveratrol - Take 1,450 mg by mouth once daily, sulfamethoxazole-trimethoprim - Take 1 tablet(s) by mouth once daily, bd luer-ila syringe - Use as directed for B12 injections, monoject tuberculin syringe - use 2 syringe(s) once a month, mounjaro - Inject 12.5 mg under the skin 1 (one) time per week, turmeric - Take by mouth, epinephrine - USE AS NEEDED AS DIRECTED, esomeprazole - TAKE 1 CAPSULE BY MOUTH EVERY MORNING BEFORE MEALS, fluticasone-umeclidin-vilanter - INHALE 1 PUFF BY MOUTH ONCE DAILY, and rizatriptan - TAKE 1 TABLET BY MOUTH AT BEGINNING OF MIGRAINE. TAKE 1 BY MOUTH EVERY 2 HOURS IF MIGRAINE NOT RESOLVED. MAXIMUM OF 3 TABLET BY MOUTH IN 24 HOURS.    PAST MEDICAL HISTORY : She has Abnormal mammogram; Arthritis; Asthma; Sleep apnea; Astigmatism, bilateral; Bariatric surgery status; Bilateral presbyopia; Bilateral sensorineural hearing loss; Bilateral tinnitus; Chronic fatigue syndrome; Chronic sinusitis; Combined hyperlipidemia; Common variable immunodeficiency;  Dermatochalasis of right upper eyelid; Diabetes mellitus type 2 without retinopathy (Multi); GERD (gastroesophageal reflux disease); Hemifacial spasm of left side of face; Hypothyroidism due to Hashimoto's thyroiditis; Insomnia; Lump or mass in breast; Nuclear sclerosis of both eyes; Restless legs syndrome; Vitamin B12 deficiency; Vitamin D deficiency, unspecified; Class 2 severe obesity due to excess calories with serious comorbidity and body mass index (BMI) of 38.0 to 38.9 in adult; Aortic stenosis; Chronic respiratory failure; Diaphragm dysfunction; Generalized abdominal pain; Brow ptosis, bilateral; Dyspnea; Iron deficiency; Posterior tibial tendon dysfunction; Primary localized osteoarthrosis of ankle and foot; Pronation of foot; Steatoblepharon; Plantar fasciitis; Osteoarthritis of knee; Bronchiectasis, uncomplicated (Multi); Statin intolerance; RLS (restless legs syndrome); Dermatochalasis; Hypertension; Hyperthyroidism; Dyslipidemia; Clonic hemifacial spasm of muscle of left side of face; PONV (postoperative nausea and vomiting); Immunocompromised; Breast pain, left; and Nipple discharge on their problem list.    PAST SURGICAL HISTORY: She  has a past surgical history that includes Colonoscopy (11/04/2013); Tonsillectomy (11/03/2016); Thyroid surgery (11/03/2016); Gallbladder surgery (11/03/2016); Hysterectomy (11/03/2016); Total knee arthroplasty (06/07/2020); Hernia repair (12/13/2016); and Ventral hernia repair (09/17/2018).     FAMILY HISTORY: No changes since previous visit. Otherwise non-contributory as charted.     SOCIAL HISTORY  She  reports that she has quit smoking. Her smoking use included cigarettes. She has been exposed to tobacco smoke. She has never used smokeless tobacco. She reports that she does not drink alcohol and does not use drugs. She works in [employer] at . She does have 3-4 cups of coffee with one after dinner.      PHYSICAL EXAM     Physical Examination: /78 (BP Location:  "Right arm, Patient Position: Sitting, BP Cuff Size: Adult)   Pulse 69   Temp 36.1 °C (97 °F) (Temporal)   Ht 1.727 m (5' 8\")   Wt 108 kg (239 lb)   LMP  (LMP Unknown)   BMI 36.34 kg/m²     PREVIOUS WEIGHTS:  Wt Readings from Last 3 Encounters:   10/23/24 108 kg (239 lb)   10/02/24 106 kg (234 lb 2.1 oz)   09/16/24 108 kg (237 lb)     No exam today    RESULTS/DATA     Iron (ug/dL)   Date Value   08/30/2024 66   12/13/2023 44   08/09/2023 58     % Saturation (%)   Date Value   08/30/2024 16 (L)   12/13/2023 9 (L)     Iron Saturation (%)   Date Value   08/09/2023 13 (L)     TIBC (ug/dL)   Date Value   08/30/2024 409   12/13/2023 472 (H)   08/09/2023 459 (H)     Ferritin   Date Value   08/30/2024 250 ng/mL (H)   08/09/2023 63 ug/L       Bicarbonate (mmol/L)   Date Value   08/30/2024 27   04/10/2024 27   12/08/2023 31       PAP Adherence  Need to get from DME    DIAGNOSES     Problem List and Orders  Diagnoses and all orders for this visit:  JOSÉ MIGUEL (obstructive sleep apnea)  -     Follow Up In Adult Sleep Medicine            ASSESSMENT/PLAN     Ms. Morris is a 64 y.o. female and with past medical history significant for JOSÉ MIGUEL on CPAP, CVID, aortic stenosis, T2DM, asthma, right hemidiaphragm paralysis s/p b/l pacer on 6/19/23, CFS, GERD, hypothyroid.  She returns in followup to  the [facility] for their RLS and sleep apnea.    JOSÉ MIGUEL.  She has mild JOSÉ MIGUEL and is continues on bilevel at 8.5/4.  Her DME is [employer].  She feels comfortable with this.  She will send me the serial number when she gets home as a [employer] message so we can see if we can remotely extract the report.    RLS.  Symptoms are stable.  She continues to use gabapentin at 600 mg nightly.  Her iron test results show that her ferritin is elevated at 250 but her percent saturation is still reduced at 16%.  For now would continue to recommend that she take an oral iron supplement.  We talked about using NovaFerrum is 1 brand and taking " it on an empty stomach with a vitamin C tablet or glass of orange juice.      We will have her repeat iron testing in December or January.  If the ferritin continues to drop and she is noting some increase in RLS symptoms we may then send her for another iron infusion.      Right hemidiaphragm paralysis.  She has a diaphragm pacing implant that was placed on 7/1/2023.  She was seen by Ms. Schwartz in May 2024 and reported feeling improved relative to the implant.  She is walking about 2 to 3 miles daily.  Positive movement on the right side was noted during assessment.  It was decided to continue pacing and they would reevaluate her in several months.  Benefits from using it but not clear that diaphragm function has returned. Continues to follow with Dr. Torres.  Will see his team in follow-up soon.    Obesity.  The patient was counseled that her weight is the strongest modifiable risk factor and contributor for JOSÉ MIGUEL. She was counseled to consider weight loss options to include changes in dietary habits and activity. Before initiating an exercise plan, she should carefully review the approach with her primary care provider.       Follow up: 4 months         Melani Wong MD PhD

## 2024-10-22 DIAGNOSIS — J98.6 DIAPHRAGM DYSFUNCTION: Primary | ICD-10-CM

## 2024-10-23 ENCOUNTER — PHARMACY VISIT (OUTPATIENT)
Dept: PHARMACY | Facility: CLINIC | Age: 64
End: 2024-10-23
Payer: COMMERCIAL

## 2024-10-23 ENCOUNTER — APPOINTMENT (OUTPATIENT)
Dept: SLEEP MEDICINE | Facility: CLINIC | Age: 64
End: 2024-10-23
Payer: COMMERCIAL

## 2024-10-23 VITALS
HEART RATE: 69 BPM | HEIGHT: 68 IN | BODY MASS INDEX: 36.22 KG/M2 | DIASTOLIC BLOOD PRESSURE: 78 MMHG | SYSTOLIC BLOOD PRESSURE: 156 MMHG | TEMPERATURE: 97 F | WEIGHT: 239 LBS

## 2024-10-23 DIAGNOSIS — E66.01 CLASS 2 SEVERE OBESITY DUE TO EXCESS CALORIES WITH SERIOUS COMORBIDITY AND BODY MASS INDEX (BMI) OF 39.0 TO 39.9 IN ADULT: ICD-10-CM

## 2024-10-23 DIAGNOSIS — G25.81 RLS (RESTLESS LEGS SYNDROME): ICD-10-CM

## 2024-10-23 DIAGNOSIS — E66.812 CLASS 2 SEVERE OBESITY DUE TO EXCESS CALORIES WITH SERIOUS COMORBIDITY AND BODY MASS INDEX (BMI) OF 39.0 TO 39.9 IN ADULT: ICD-10-CM

## 2024-10-23 DIAGNOSIS — G47.33 OSA (OBSTRUCTIVE SLEEP APNEA): ICD-10-CM

## 2024-10-23 DIAGNOSIS — E83.10 DISORDER OF IRON METABOLISM: Primary | ICD-10-CM

## 2024-10-23 PROCEDURE — 99214 OFFICE O/P EST MOD 30 MIN: CPT | Performed by: INTERNAL MEDICINE

## 2024-10-23 PROCEDURE — 3049F LDL-C 100-129 MG/DL: CPT | Performed by: INTERNAL MEDICINE

## 2024-10-23 PROCEDURE — 3078F DIAST BP <80 MM HG: CPT | Performed by: INTERNAL MEDICINE

## 2024-10-23 PROCEDURE — 1159F MED LIST DOCD IN RCRD: CPT | Performed by: INTERNAL MEDICINE

## 2024-10-23 PROCEDURE — 3077F SYST BP >= 140 MM HG: CPT | Performed by: INTERNAL MEDICINE

## 2024-10-23 PROCEDURE — 3008F BODY MASS INDEX DOCD: CPT | Performed by: INTERNAL MEDICINE

## 2024-10-23 PROCEDURE — 3044F HG A1C LEVEL LT 7.0%: CPT | Performed by: INTERNAL MEDICINE

## 2024-10-23 RX ORDER — GABAPENTIN 300 MG/1
600 CAPSULE ORAL NIGHTLY
Qty: 180 CAPSULE | Refills: 0 | Status: SHIPPED | OUTPATIENT
Start: 2024-10-23 | End: 2025-01-21

## 2024-10-23 NOTE — PATIENT INSTRUCTIONS
[facility] Sleep Medicine  [address]  [facility]  [address]  [facility] [address]    [facility] [place]  [address]  [facility] [id number] [phone]  [facility]  [address]  [facility] [address]           NAME: Lara Morris   DATE: 10/23/2024     Your Sleep Provider Today: Melani Wong MD PhD  Your Primary Care Physician: Chan Hernandez MD   Your Referring Provider: Melani Wong MD PhD    Thank you for coming to the Sleep Medicine Clinic today! Your sleep medicine provider today was: Melani Wong MD PhD Below is a summary of your treatment plan, other important information, and our contact numbers:  If you need to schedule an appointment, please call [phone])  If you need general assistance (e.g. forms completed, general questions), please call my , Kayla, at [phone].  If you have a medical question about your sleep issues, please contact our nurses, Jo nAn or Jaylin at [phone].   You can also contact us through [facility].      DIAGNOSIS:   1. Disorder of iron metabolism  Ferritin    Iron and TIBC      2. JOSÉ MIGUEL (obstructive sleep apnea)  Follow Up In Adult Sleep Medicine      3. RLS (restless legs syndrome)  gabapentin (Neurontin) 300 mg capsule              TREATMENT PLAN     Take your gabapentin for your RLS  Take the iron supplement on a empty stomach  Get iron tests in December/January - empty stomach and no red meat the night before    Instructions - Common JOSÉ MIGUEL Recs: - For your sleep apnea, continue to use your PAP every night and use it whenever you are sleeping.   - Avoid alcohol or sedatives several hours prior to sleeping.   - Get additional supplies for your PAP (e.g., mask, hose, filters) every 3 months or as your insurance allows from your [employer] company. Replacement cushions for your PAP mask can be requested monthly if airseals are  an issue.  - Remember to clean your mask, tubings, and water chamber regularly as instructed.  - Avoid driving or operating heavy machinery when drowsy. A person driving while sleepy is five (5) times more likely to have an accident. If you feel sleepy, pull over and take a short power nap (sleep for less than 30 minutes). Otherwise, ask somebody to drive you.        Follow-up Appointment:   Followup with me in 4 months.      IMPORTANT INFORMATION     Call 911 for medical emergencies.  Our offices are generally open from Monday-Friday, 9 am - 5 pm.  If you need to get in touch with me, you may either call me and my team(number is below) or you can use [employer].  If a referral for a test, for CPAP, or for another specialist was made, and you have not heard about scheduling this within a week, please call scheduling at [phone]).  If you are unable to make your appointment for clinic or an overnight study, kindly call the office at least 48 hours in advance to cancel and reschedule.  If you are on CPAP, please bring your device's card or the device to each clinic appointment.   There are no supporting services by either the sleep doctors or their staff on weekends and Holidays, or after 5 PM on weekdays.   If you have been asked to come to a sleep study, make sure you bring toiletries, a comfy pillow, and any nighttime medications that you may regularly take. Also be sure to eat dinner before you arrive. We generally do not provide meals.      PRESCRIPTIONS     We require 7 days advanced notice for prescription refills. If we do not receive the request in this time, we cannot guarantee that your medication will be refilled in time.      IMPORTANT PHONE NUMBERS      scheduling for medical testin405 - [phone]   Sleep Medicine Clinic Fax: [phone]  Appointments (for Pediatric Sleep Clinic): [phone] - option 1  Appointments (for Adult Sleep Clinic): [phone] - option  2  Appointments (For Sleep Studies): [phone] - option 3  Behavioral Sleep Medicine: [phone]  Bariatric Surgery: [phone] ( Bariatric Surgery Website)   Sleep Surgery: [phone]  ENT (Otolaryngology): [phone]  Myofunctional Therapy (ENT): TRAY Mcnamara, Elizabeth Marquez, Misbah, Rosas; [phone]   [facility] Lopez; [phone]  Jonny Stafford; [phone]  [facility]; [phone]  [facility] Nu[facility]ez/[facility] [phone] (option 1)  Headache Clinic (Neurology): [phone]  Neurology: [phone]  Psychiatry: [phone]  Pulmonary Function Testing (PFT) Center: [phone]  Pulmonary Medicine: [phone]  [facility] (DME): [phone]  [facility] (DME): [phone]  [facility] (DME): [phone]      COMMON PROVIDERS WE REFER TO     For Weight Loss - Dr. Stephen Seth - Call [phone]  For Sleep Surgery - Dr. August Godinez - Call [phone]      OUR ADULT SLEEP MEDICINE TEAM   Please do not hesitate to call the office or sleep nurse with any questions between appointments:    Adult Sleep Nurses (Jaylin Wilson, RN and Jo Ann Mcgarry RN):  For clinical questions and refilling prescriptions: [phone]  Email sleep diaries and other documents at: [email or web address]    Adult Sleep Medicine Secretaries:  Lyn Freeman (For Nabila/Holland/Krise/Strohl/Yeh/Cabrera):   P: [phone]  F: [phone]  Kayla Gregg (For Wong/Guggenbiller): P: [phone]  Fax: [phone]  Kailee Duobn (For Jurcevic/Blank): P: [phone]  F: [phone]  Kathrin Finley (For Crabtree): P: [phone]  F: [phone]  Ashlee Mcarthur (For Karyna/Beau/[place]): P: [phone]  F: [phone]  Olga Brooke (For Travis/[place]ley): P: [phone]  F: [phone]     Adult Sleep Medicine Advanced Practice Providers:  Cm Hare ([place], [place])  Karlie Yanez ([facility]  "[place])  Lesa Pérez CNP ([place], [place], [place])  Rocío Henry CNP ([place], [place], [place])  Briseida Garcia ([place], [place], [place])  Maninder Butcher CNP ([place], [place])        OUR SLEEP TESTING LOCATIONS     Our team will contact you to schedule your sleep study, however, you can contact us as follow:  Main Phone Line (scheduling only): [phone]), option 3  Adult and Pediatric Locations   [place] (6 years and older): [facility] - 4th floor ([address] [facility] [facility]) After hours line: [phone]  [facility] (Main campus: All ages): [facility], 6th floor. After hours line: [phone]   [place] (5 years and older; younger considered on case-by-case basis): [address]; [address], [address]. Scheduling  After hours line: [phone]   Jonny (6 years and older): [address]; [address]; [address]   [place] (6 years and older): [address] [facility], [address]  After hours line: [phone]   [place] (13 years and older) in [place]: [address], 2nd floor  After hours line: [phone]  [facility] (13 year and older): [address]  After hours line: [phone]     Adult Only Locations:   [place] (18 years and older): [address] [facility], 2nd floor   [place] (18 years and older): [address] [facility] [address] [facility]; 4th floor  After hours line: [phone]   [place] (18 years and older) at [place]: [address] [facility]  After hours line: [phone]          CONTACTING YOUR SLEEP MEDICINE PROVIDER     Send a message directly to your provider through \"My Chart\", which is the email service through your  Records Account: [email or web address]   Call [phone] and leave a message. One of the administrative assistants will forward the message to your sleep medicine provider through \"My Chart\" and/or email.     Your sleep " medicine provider for this visit was: Melani Wong MD PhD

## 2024-10-28 DIAGNOSIS — J31.0 CHRONIC RHINITIS: ICD-10-CM

## 2024-10-29 ENCOUNTER — HOME INFUSION (OUTPATIENT)
Dept: INFUSION THERAPY | Age: 64
End: 2024-10-29
Payer: COMMERCIAL

## 2024-10-29 ENCOUNTER — DOCUMENTATION (OUTPATIENT)
Dept: INFUSION THERAPY | Age: 64
End: 2024-10-29
Payer: COMMERCIAL

## 2024-10-29 PROCEDURE — RXMED WILLOW AMBULATORY MEDICATION CHARGE

## 2024-10-29 RX ORDER — AZELASTINE 1 MG/ML
SPRAY, METERED NASAL
Qty: 90 ML | Refills: 3 | Status: SHIPPED | OUTPATIENT
Start: 2024-10-29 | End: 2025-10-28

## 2024-10-30 ENCOUNTER — PHARMACY VISIT (OUTPATIENT)
Dept: PHARMACY | Facility: CLINIC | Age: 64
End: 2024-10-30
Payer: COMMERCIAL

## 2024-11-04 ENCOUNTER — SPECIALTY PHARMACY (OUTPATIENT)
Dept: PHARMACY | Facility: CLINIC | Age: 64
End: 2024-11-04

## 2024-11-04 ENCOUNTER — HOSPITAL ENCOUNTER (OUTPATIENT)
Dept: RADIOLOGY | Facility: CLINIC | Age: 64
Discharge: HOME | End: 2024-11-04
Payer: COMMERCIAL

## 2024-11-04 ENCOUNTER — APPOINTMENT (OUTPATIENT)
Dept: RHEUMATOLOGY | Facility: CLINIC | Age: 64
End: 2024-11-04
Payer: COMMERCIAL

## 2024-11-04 VITALS
WEIGHT: 241.8 LBS | BODY MASS INDEX: 36.65 KG/M2 | SYSTOLIC BLOOD PRESSURE: 128 MMHG | TEMPERATURE: 97.9 F | DIASTOLIC BLOOD PRESSURE: 81 MMHG | HEART RATE: 69 BPM | HEIGHT: 68 IN | OXYGEN SATURATION: 96 %

## 2024-11-04 DIAGNOSIS — M19.90 ARTHRITIS: ICD-10-CM

## 2024-11-04 DIAGNOSIS — M19.90 ARTHRITIS: Primary | ICD-10-CM

## 2024-11-04 PROCEDURE — 3074F SYST BP LT 130 MM HG: CPT | Performed by: INTERNAL MEDICINE

## 2024-11-04 PROCEDURE — 3049F LDL-C 100-129 MG/DL: CPT | Performed by: INTERNAL MEDICINE

## 2024-11-04 PROCEDURE — 73502 X-RAY EXAM HIP UNI 2-3 VIEWS: CPT | Mod: RIGHT SIDE | Performed by: RADIOLOGY

## 2024-11-04 PROCEDURE — 99214 OFFICE O/P EST MOD 30 MIN: CPT | Performed by: INTERNAL MEDICINE

## 2024-11-04 PROCEDURE — 73502 X-RAY EXAM HIP UNI 2-3 VIEWS: CPT | Mod: RT

## 2024-11-04 PROCEDURE — 1160F RVW MEDS BY RX/DR IN RCRD: CPT | Performed by: INTERNAL MEDICINE

## 2024-11-04 PROCEDURE — RXMED WILLOW AMBULATORY MEDICATION CHARGE

## 2024-11-04 PROCEDURE — 1036F TOBACCO NON-USER: CPT | Performed by: INTERNAL MEDICINE

## 2024-11-04 PROCEDURE — 3044F HG A1C LEVEL LT 7.0%: CPT | Performed by: INTERNAL MEDICINE

## 2024-11-04 PROCEDURE — 1159F MED LIST DOCD IN RCRD: CPT | Performed by: INTERNAL MEDICINE

## 2024-11-04 PROCEDURE — 1125F AMNT PAIN NOTED PAIN PRSNT: CPT | Performed by: INTERNAL MEDICINE

## 2024-11-04 PROCEDURE — 3079F DIAST BP 80-89 MM HG: CPT | Performed by: INTERNAL MEDICINE

## 2024-11-04 PROCEDURE — 3008F BODY MASS INDEX DOCD: CPT | Performed by: INTERNAL MEDICINE

## 2024-11-04 RX ORDER — GABAPENTIN 100 MG/1
300 CAPSULE ORAL DAILY
Qty: 90 CAPSULE | Refills: 2 | Status: SHIPPED | OUTPATIENT
Start: 2024-11-04 | End: 2025-02-02

## 2024-11-04 RX ORDER — IBUPROFEN 800 MG/1
800 TABLET ORAL 2 TIMES DAILY PRN
Qty: 180 TABLET | Refills: 1 | Status: SHIPPED | OUTPATIENT
Start: 2024-11-04 | End: 2025-11-04

## 2024-11-04 RX ORDER — MEPOLIZUMAB 100 MG/ML
100 INJECTION, SOLUTION SUBCUTANEOUS
Qty: 1 ML | Refills: 12 | Status: CANCELLED | OUTPATIENT
Start: 2024-10-30

## 2024-11-04 ASSESSMENT — PAIN SCALES - GENERAL: PAINLEVEL_OUTOF10: 5

## 2024-11-04 NOTE — PROGRESS NOTES
She complains of pain in the right groin and in the lateral aspect of the right hip.  She has been doing exercises to reduce to external rotation of the right hip with ambulation.  She uses a tall walking stick when walking for long distances.  She has some discomfort in the lower back.  She had significant pain relief following intra-articular steroid injection to her right hip 5/21/2024 by the orthopedic surgeon.  She had less pain relief following the second intra-articular injection to the right hip 9/25/2024.  She has sensation of locking of the right hip intermittently.  She also complains of pain in the upper back.    She has tightness in the neck.  She has triggering and discomfort in the third digit of the right hand.  The pain and triggering of the third digit of the right hand has improved following local steroid injection to the flexor tendon 8/6/2024.  She has been taking gabapentin 300 mg every evening per pain management in addition to the gabapentin 100 mg every morning.    There is tenderness in the supraspinatus muscles bilaterally.  There is slightly limited right and left lateral flexion of the neck.  There is mild tenderness to palpation of the lower back.  There is mildly limited internal rotation of the right hip.  She holds the right hip and external rotation.  There is tenderness overlying the right greater trochanter.  There is a mildly tender subcutaneous nodule in the distribution of the flexor tendon of the third digit of the right hand at the level of the MCP joint.  There is triggering of the third digit of the right hand.  Right diaphragmatic stimulator electrode in place in the right upper abdomen.    Laboratory (8/30/2024) IgG 1340, IgA 177, IgM 179, WBC 10.3, hemoglobin 14.9, hematocrit 43.8, MCV 92, MCHC 34.0, platelets 302, BUN 19, creatinine 0.74, glucose 59, calcium 8.9, albumin 4.2, alkaline phosphatase 61, AST 13, ALT 18, ferritin 250, iron 66, TIBC 409, saturation  16%.    X-ray bilateral hips (8/8/2023) mild degenerative arthritis of both hips with osteophytes and mild medial joint space narrowing.  Enthesophytes at the left greater trochanter near the gluteal tendon insertion.    X-ray spine (8/10/2023) mild dextroscoliosis centered in the lower thoracic region, mild levoscoliosis centered in the mid lumbar region.  Moderate spondylosis and facet arthropathy at L4/L5.  Mild spondylosis and moderate facet arthropathy at L5/S1.  Prominent degenerative paravertebral osteophyte on the left at L1/L2.    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  She has right greater trochanteric bursitis.  She has flexor tendinitis of the third digit of the right hand.    She was given a local injection of triamcinolone 20 mg with 1 mL of 1% lidocaine to the region of tenderness in the supraspinatus muscles bilaterally and to the region of the right greater trochanteric bursa.  She is to do stretching exercises to the digits of both hands.  She is to return at the next available office appointment.

## 2024-11-05 PROCEDURE — RXMED WILLOW AMBULATORY MEDICATION CHARGE

## 2024-11-05 RX ORDER — MEPOLIZUMAB 100 MG/ML
100 INJECTION, SOLUTION SUBCUTANEOUS
Qty: 1 ML | Refills: 12 | Status: CANCELLED | OUTPATIENT
Start: 2024-11-05

## 2024-11-06 ENCOUNTER — HOME INFUSION (OUTPATIENT)
Dept: INFUSION THERAPY | Age: 64
End: 2024-11-06

## 2024-11-06 ENCOUNTER — CLINICAL SUPPORT (OUTPATIENT)
Dept: AUDIOLOGY | Facility: CLINIC | Age: 64
End: 2024-11-06

## 2024-11-06 DIAGNOSIS — D83.9 COMMON VARIABLE IMMUNODEFICIENCY: ICD-10-CM

## 2024-11-06 DIAGNOSIS — H90.3 SENSORINEURAL HEARING LOSS (SNHL) OF BOTH EARS: Primary | ICD-10-CM

## 2024-11-06 PROCEDURE — V5011 HEARING AID FITTING/CHECKING: HCPCS | Mod: AUDSP | Performed by: AUDIOLOGIST

## 2024-11-06 PROCEDURE — RXMED WILLOW AMBULATORY MEDICATION CHARGE

## 2024-11-06 RX ORDER — MEPOLIZUMAB 100 MG/ML
100 INJECTION, SOLUTION SUBCUTANEOUS
Qty: 1 ML | Refills: 12 | Status: CANCELLED | OUTPATIENT
Start: 2024-11-06

## 2024-11-06 ASSESSMENT — PAIN SCALES - GENERAL: PAINLEVEL_OUTOF10: 0 - NO PAIN

## 2024-11-06 ASSESSMENT — PAIN - FUNCTIONAL ASSESSMENT: PAIN_FUNCTIONAL_ASSESSMENT: 0-10

## 2024-11-06 NOTE — PROGRESS NOTES
HISTORY:  Hearing aid check.  Since her last visit she has been getting constant feedback.  She also states that she hears better when she puts her fingers in her ears (occluding her ear canals) over her domes.    She was connected to [facility] today.  I noted that she is very much over target in both ears.  I recalculated her to meet target in both ears.  I also changed her dome size to a large power dome.  She stated that she could hear better but felt like she was under water in both ears.  Adjustments in [facility] did not help.  She still was turning the hearing aids up to max once disconnected from [facility].  I reconnected her and increased her volume by 6dB in both ears.  She felt like this was a better start up volume.  No feedback was heard in office with the new changes today.     Hearing aid information: Fit 9/9/19  Right ear: AudÃ©o M70-RSN: [id number]  Left ear: AudÃ©o M70-RSN: [id number]  M Receivers size 1 medium vented domes.   Warranty expires 11/13/2022    Treatment Plan:   Annual hearing evaluations.   Hearing aid checks as needed.     TIME:  [id number]

## 2024-11-06 NOTE — PROGRESS NOTES
Received RX to continue current Hizentra therapy of 24Gm subcutaneous q weekly x 6 more refills. Orders profiled in EPIC and [employer]. Orders sent to .    Next Delivery due around 11/26    Follow up 11/18/24 with patient on delivery needs

## 2024-11-07 ENCOUNTER — PHARMACY VISIT (OUTPATIENT)
Dept: PHARMACY | Facility: CLINIC | Age: 64
End: 2024-11-07
Payer: COMMERCIAL

## 2024-11-08 ENCOUNTER — APPOINTMENT (OUTPATIENT)
Dept: SURGERY | Facility: CLINIC | Age: 64
End: 2024-11-08
Payer: COMMERCIAL

## 2024-11-08 ENCOUNTER — HOSPITAL ENCOUNTER (OUTPATIENT)
Dept: RADIOLOGY | Facility: HOSPITAL | Age: 64
Discharge: HOME | End: 2024-11-08
Payer: COMMERCIAL

## 2024-11-08 ENCOUNTER — PHARMACY VISIT (OUTPATIENT)
Dept: PHARMACY | Facility: CLINIC | Age: 64
End: 2024-11-08
Payer: COMMERCIAL

## 2024-11-08 VITALS
RESPIRATION RATE: 20 BRPM | SYSTOLIC BLOOD PRESSURE: 118 MMHG | HEIGHT: 68 IN | BODY MASS INDEX: 35.46 KG/M2 | DIASTOLIC BLOOD PRESSURE: 68 MMHG | HEART RATE: 84 BPM | WEIGHT: 234 LBS

## 2024-11-08 DIAGNOSIS — J98.6 DIAPHRAGM DYSFUNCTION: Primary | ICD-10-CM

## 2024-11-08 DIAGNOSIS — J98.6 DIAPHRAGM DYSFUNCTION: ICD-10-CM

## 2024-11-08 PROCEDURE — 1159F MED LIST DOCD IN RCRD: CPT | Performed by: NURSE PRACTITIONER

## 2024-11-08 PROCEDURE — 1126F AMNT PAIN NOTED NONE PRSNT: CPT | Performed by: NURSE PRACTITIONER

## 2024-11-08 PROCEDURE — 99214 OFFICE O/P EST MOD 30 MIN: CPT | Performed by: NURSE PRACTITIONER

## 2024-11-08 PROCEDURE — 3044F HG A1C LEVEL LT 7.0%: CPT | Performed by: NURSE PRACTITIONER

## 2024-11-08 PROCEDURE — 1036F TOBACCO NON-USER: CPT | Performed by: NURSE PRACTITIONER

## 2024-11-08 PROCEDURE — 3049F LDL-C 100-129 MG/DL: CPT | Performed by: NURSE PRACTITIONER

## 2024-11-08 PROCEDURE — 3008F BODY MASS INDEX DOCD: CPT | Performed by: NURSE PRACTITIONER

## 2024-11-08 PROCEDURE — 3074F SYST BP LT 130 MM HG: CPT | Performed by: NURSE PRACTITIONER

## 2024-11-08 PROCEDURE — 71046 X-RAY EXAM CHEST 2 VIEWS: CPT

## 2024-11-08 PROCEDURE — 3078F DIAST BP <80 MM HG: CPT | Performed by: NURSE PRACTITIONER

## 2024-11-08 ASSESSMENT — PAIN SCALES - GENERAL: PAINLEVEL_OUTOF10: 0-NO PAIN

## 2024-11-08 NOTE — PROGRESS NOTES
"Subjective   Patient ID: Lara Morris is a 64 y.o. female.    HPI She had onset of symptoms Jan 2023 but there is evidence of diaphragm elevation on CT scan dating to Oct 2021. She was implanted with DP Jun 19, 2023. She did have great improvement in the first several months. Then had a plateau. She does use pacer full time.   Today she initially reported no real changes in symptoms from May however she then reported she went with the \"kids\" for several mile walk and and no issues. \"So maybe I am better\"      Review of Systems    Objective   Physical Exam  Constitutional:       Appearance: Normal appearance.   Pulmonary:      Effort: Pulmonary effort is normal.   Abdominal:      Palpations: Abdomen is soft.      Comments: Wire site intact   Musculoskeletal:         General: Normal range of motion.   Skin:     General: Skin is warm and dry.   Neurological:      General: No focal deficit present.      Mental Status: She is alert and oriented to person, place, and time.   Psychiatric:         Mood and Affect: Mood normal.         Assessment/Plan   There are no diagnoses linked to this encounter.  CXR done today and the right HD remains elevated - there is contraction of the lateral wall. EMG done today and there is slightly more activity today compared to previous recordings.   She has been pacing for 1 year 5 months.   Since we are having some new changes, we will continue pacing but will decrease to night time only. We will continue to pace for about 5 more months then repeat testing including SNIFF and then make determinations.     "

## 2024-11-10 RX ORDER — FLUTICASONE FUROATE, UMECLIDINIUM BROMIDE AND VILANTEROL TRIFENATATE 200; 62.5; 25 UG/1; UG/1; UG/1
1 POWDER RESPIRATORY (INHALATION) DAILY
Qty: 60 EACH | Refills: 11 | Status: CANCELLED | OUTPATIENT
Start: 2024-11-10 | End: 2025-11-10

## 2024-11-11 PROCEDURE — RXMED WILLOW AMBULATORY MEDICATION CHARGE

## 2024-11-13 ENCOUNTER — PHARMACY VISIT (OUTPATIENT)
Dept: PHARMACY | Facility: CLINIC | Age: 64
End: 2024-11-13
Payer: COMMERCIAL

## 2024-11-15 DIAGNOSIS — E11.9 DIABETES MELLITUS TYPE 2 WITHOUT RETINOPATHY (MULTI): ICD-10-CM

## 2024-11-15 DIAGNOSIS — E66.01 CLASS 2 SEVERE OBESITY DUE TO EXCESS CALORIES WITH SERIOUS COMORBIDITY AND BODY MASS INDEX (BMI) OF 38.0 TO 38.9 IN ADULT: ICD-10-CM

## 2024-11-15 DIAGNOSIS — E66.812 CLASS 2 SEVERE OBESITY DUE TO EXCESS CALORIES WITH SERIOUS COMORBIDITY AND BODY MASS INDEX (BMI) OF 38.0 TO 38.9 IN ADULT: ICD-10-CM

## 2024-11-15 PROCEDURE — RXMED WILLOW AMBULATORY MEDICATION CHARGE

## 2024-11-15 RX ORDER — TIRZEPATIDE 12.5 MG/.5ML
12.5 INJECTION, SOLUTION SUBCUTANEOUS
Qty: 2 ML | Refills: 2 | Status: SHIPPED | OUTPATIENT
Start: 2024-11-17 | End: 2025-02-09

## 2024-11-15 RX ORDER — MEPOLIZUMAB 100 MG/ML
100 INJECTION, SOLUTION SUBCUTANEOUS
Qty: 1 ML | Refills: 12 | Status: CANCELLED | OUTPATIENT
Start: 2024-11-15

## 2024-11-19 ENCOUNTER — PHARMACY VISIT (OUTPATIENT)
Dept: PHARMACY | Facility: CLINIC | Age: 64
End: 2024-11-19
Payer: COMMERCIAL

## 2024-11-24 ENCOUNTER — SPECIALTY PHARMACY (OUTPATIENT)
Dept: PHARMACY | Facility: CLINIC | Age: 64
End: 2024-11-24

## 2024-11-24 PROCEDURE — RXMED WILLOW AMBULATORY MEDICATION CHARGE

## 2024-11-25 ENCOUNTER — HOME INFUSION (OUTPATIENT)
Dept: INFUSION THERAPY | Age: 64
End: 2024-11-25
Payer: COMMERCIAL

## 2024-11-25 NOTE — PROGRESS NOTES
Review of chart. Patient infuses Hizentra 24gm subcutaneously once weekly.  Dr Richey follows outpatient. New RX was received on 10/30/24 with 6 refills. Patient to  next delivery on 11/27- this will be first fill on new RX.    Per patient email, supplies needed this delivery include 9mm needles and an extra  tubing (had one with faulty end connector). Patient does not need EMLA this month.    Auth is good thru 7/25 November FOTM is complete  Taya kit remains in date    Processed fill for 8 x hizentra 10gm and 4 x hizentra 4gm vials for patient  11/27/24 to cover 11/27 thru 12/24/24.    Follow up 12/18 to determine next /delivery date with patient and check supply needs.

## 2024-11-26 ENCOUNTER — DOCUMENTATION (OUTPATIENT)
Dept: INFUSION THERAPY | Age: 64
End: 2024-11-26
Payer: COMMERCIAL

## 2024-11-26 NOTE — PROGRESS NOTES
Ticket made from pharmacist note:    Patient to   tomorrow by 11am from pharmacy or warehouse staff. Confirmed with patient.      Suppling standard supplies for 4 doses  qweekly of Hizentra delivered subcutaneously  via [facility] [employer] .  Sending extra tubing per pt.     Patient previously spoke with a pharmacist.

## 2024-12-03 ENCOUNTER — SPECIALTY PHARMACY (OUTPATIENT)
Dept: PHARMACY | Facility: CLINIC | Age: 64
End: 2024-12-03

## 2024-12-03 PROCEDURE — RXMED WILLOW AMBULATORY MEDICATION CHARGE

## 2024-12-04 RX ORDER — MEPOLIZUMAB 100 MG/ML
100 INJECTION, SOLUTION SUBCUTANEOUS
Qty: 1 ML | Refills: 12 | Status: CANCELLED | OUTPATIENT
Start: 2024-12-04

## 2024-12-05 PROCEDURE — RXMED WILLOW AMBULATORY MEDICATION CHARGE

## 2024-12-07 PROCEDURE — RXMED WILLOW AMBULATORY MEDICATION CHARGE

## 2024-12-09 DIAGNOSIS — B99.9 RECURRENT INFECTIONS: Primary | ICD-10-CM

## 2024-12-09 PROCEDURE — RXMED WILLOW AMBULATORY MEDICATION CHARGE

## 2024-12-10 ENCOUNTER — PHARMACY VISIT (OUTPATIENT)
Dept: PHARMACY | Facility: CLINIC | Age: 64
End: 2024-12-10
Payer: COMMERCIAL

## 2024-12-10 ENCOUNTER — APPOINTMENT (OUTPATIENT)
Dept: PHARMACY | Facility: HOSPITAL | Age: 64
End: 2024-12-10
Payer: COMMERCIAL

## 2024-12-10 DIAGNOSIS — E11.9 DIABETES MELLITUS TYPE 2 WITHOUT RETINOPATHY (MULTI): Primary | ICD-10-CM

## 2024-12-10 DIAGNOSIS — E66.812 CLASS 2 SEVERE OBESITY DUE TO EXCESS CALORIES WITH SERIOUS COMORBIDITY AND BODY MASS INDEX (BMI) OF 38.0 TO 38.9 IN ADULT: ICD-10-CM

## 2024-12-10 DIAGNOSIS — E66.01 CLASS 2 SEVERE OBESITY DUE TO EXCESS CALORIES WITH SERIOUS COMORBIDITY AND BODY MASS INDEX (BMI) OF 38.0 TO 38.9 IN ADULT: ICD-10-CM

## 2024-12-10 PROCEDURE — RXMED WILLOW AMBULATORY MEDICATION CHARGE

## 2024-12-10 RX ORDER — TIRZEPATIDE 12.5 MG/.5ML
12.5 INJECTION, SOLUTION SUBCUTANEOUS
Qty: 6 ML | Refills: 3 | Status: SHIPPED | OUTPATIENT
Start: 2024-12-15

## 2024-12-10 RX ORDER — BLOOD SUGAR DIAGNOSTIC
1 STRIP MISCELLANEOUS DAILY
Qty: 100 EACH | Refills: 0 | Status: SHIPPED | OUTPATIENT
Start: 2024-12-10

## 2024-12-10 RX ORDER — LANCETS
EACH MISCELLANEOUS
Qty: 100 EACH | Refills: 3 | Status: SHIPPED | OUTPATIENT
Start: 2024-12-10

## 2024-12-10 RX ORDER — LANCETS 26 GAUGE
EACH MISCELLANEOUS
Qty: 1 EACH | Refills: 0 | Status: SHIPPED | OUTPATIENT
Start: 2024-12-10 | End: 2025-12-10

## 2024-12-10 RX ORDER — DEXTROSE 4 G
1 TABLET,CHEWABLE ORAL DAILY
Qty: 1 EACH | Refills: 0 | Status: SHIPPED | OUTPATIENT
Start: 2024-12-10

## 2024-12-10 ASSESSMENT — ENCOUNTER SYMPTOMS: DIABETIC ASSOCIATED SYMPTOMS: 0

## 2024-12-10 NOTE — PROGRESS NOTES
[facility] ([facility])    Lara Morris is a 64 y.o. female was referred to Clinical Pharmacy Team to complete a comprehensive medication review (CMR) with a pharmacist as part of the [employer] diabetes program.  Pharmacy team may also provide assistance in diabetes management per discussion with referring provider and/or endocrinology.    Referring Provider: Chan Hernandez MD  Does patient follow with Endocrinology: No    Subjective   Allergies   Allergen Reactions    Adhesive Hives     All adhesives (including clonidine patches) caused blistering hives    Gamunex-C [Immune Globul G-Gly-Iga Avg 46] Anaphylaxis    Immune Globulin Hives and Shortness of breath    Immune Globulin,Gamma (Igg) Human Hives and Shortness of breath    Animal Dander Runny nose     Sneezing    Atorvastatin Myalgia    Codeine Nausea/vomiting    House Dust Runny nose     Sneezing    Ropinirole Hives    Tramadol Nausea/vomiting    Ciprofloxacin Hives and Rash    Erythromycin Hives and Rash       Diabetes  She presents for her follow-up diabetic visit. She has type 2 diabetes mellitus. Her disease course has been stable. There are no diabetic associated symptoms. There are no hypoglycemic complications. There are no diabetic complications. She is compliant with treatment all of the time. Her weight is stable. Meal planning includes avoidance of concentrated sweets and carbohydrate counting. There is no change in her home blood glucose trend. An ACE inhibitor/angiotensin II receptor blocker is not being taken.   Since last visit, Mounjaro dose increased and metformin and novolog slide scale discontinued due to adequate glycemic control. Tolerating GLP1a well, no GI side effects.  Receives steroid injection or prednisone course ~ quarterly.    Objective   Lab Results   Component Value Date    BILITOT 0.5 08/30/2024    CALCIUM 8.9 08/30/2024    CO2 27 08/30/2024     08/30/2024     "CREATININE 0.74 08/30/2024    GLUCOSE 59 (L) 08/30/2024    ALKPHOS 61 08/30/2024    K 4.1 08/30/2024    PROT 6.8 08/30/2024     08/30/2024    AST 13 08/30/2024    ALT 18 08/30/2024    BUN 19 08/30/2024    ANIONGAP 12 08/30/2024     04/30/2023    ALBUMIN 4.2 08/30/2024    LIPASE 22 08/22/2019    GFRF 82 07/21/2023     Lab Results   Component Value Date    TRIG 77 04/10/2024    CHOL 180 04/10/2024    LDLCALC 109 (H) 04/10/2024    HDL 55.7 04/10/2024     Lab Results   Component Value Date    HGBA1C 5.3 04/10/2024     Estimated body mass index is 35.58 kg/m² as calculated from the following:    Height as of 11/8/24: 1.727 m (5' 8\").    Weight as of 11/8/24: 106 kg (234 lb).     Current Outpatient Medications on File Prior to Visit   Medication Sig Dispense Refill    acetaminophen (Tylenol) 325 mg tablet Take 2 tablets (650 mg) by mouth every 6 hours if needed for mild pain (1 - 3).      acyclovir-hydrocortisone 5-1 % cream Xerese 5-1 % External Cream Quantity: 5 Refills: 0 DO Start : 22-Mar-2018 Active      albuterol 90 mcg/actuation inhaler Inhale 2 puff by mouth every 4 hours as needed Inhalation 54 g 6    albuterol 90 mcg/actuation inhaler Inhale 2 puffs every 4 hours as needed 54 g 6    azelastine (Astelin) 137 mcg (0.1 %) nasal spray USE 1-2 SPRAYS IN EACH NOSTRIL ONCE DAILY TO TWO TIMES A DAY AS NEEDED FOR NASAL DRAINAGE 90 mL 3    calcium citrate (Calcitrate) 200 mg (950 mg) tablet Take 1 tablet (200 mg) by mouth once daily.      cetirizine (ZyrTEC) 10 mg tablet Take 1 tablet (10 mg) by mouth 2 times a day.      cholecalciferol (Vitamin D-3) 25 MCG (1000 UT) tablet Take 1 tablet (25 mcg) by mouth once daily.      clobetasol (Temovate) 0.05 % cream Apply 1 application twice daily as needed for rash. 60 g 1    clobetasol (Temovate) 0.05 % ointment Apply 1 application 2 times daily for 2 weeks and then as needed for 2 weeks. 45 g 2    cyanocobalamin (Vitamin B-12) 1,000 mcg/mL injection Inject 1 mL into " the muscle every 3 weeks. 3 mL 4    cyanocobalamin (Vitamin B-12) 1,000 mcg/mL injection Inject 1 mL (1000 mcg) into the muscle every 3 weeks 3 mL 4    eletriptan (Relpax) 40 mg tablet Take 1 tablet at onset of headache. May repeat in 2 hours one time. 30 tablet 17    epinastine (Elestat) 0.05 % ophthalmic solution Instill 1 drop into affected eye twice daily as needed 5 mL 2    EPINEPHrine (EpiPen 2-Korey) 0.3 mg/0.3 mL injection syringe Use intramuscularly as needed for allergic reaction. 10 each 6    esomeprazole (NexIUM) 40 mg DR capsule TAKE 1 CAPSULE BY MOUTH EVERY MORNING BEFORE MEALS 90 capsule 3    ezetimibe (Zetia) 10 mg tablet Take 1 tablet (10 mg) by mouth once daily. 90 tablet 3    ferrous fumarate-vitamin C (Romulo-Sequeles 65-25) Take 1 tablet by mouth 3 times daily (morning, midday, late afternoon). Do not crush, chew, or split.      fluticasone (Flonase) 50 mcg/actuation nasal spray Administer 2 sprays into each nostril once daily.      fluticasone-umeclidin-vilanter (Trelegy Ellipta) 100-62.5-25 mcg blister with device Inhale 1 puff by mouth daily 60 each 11    gabapentin (Neurontin) 100 mg capsule Take 3 capsules (300 mg) by mouth once daily. Take 1 capsule every morning.  Continue taking the gabapentin 300 mg capsule every evening 90 capsule 2    gabapentin (Neurontin) 300 mg capsule Take 2 capsules (600 mg) by mouth once daily at bedtime. 180 capsule 0    ginseng 100 mg capsule Take 100 mg by mouth once daily.      ibuprofen 800 mg tablet Take 1 tablet (800 mg) by mouth 2 times a day as needed for moderate pain (4 - 6). 180 tablet 1    immune globulin, human, (Hizentra) subcutaneous infusion Inject 120 mL (24 g) under the skin 1 (one) time per week for 24 doses. 3600 mL 0    ipratropium-albuteroL (Duo-Neb) 0.5-2.5 mg/3 mL nebulizer solution Take 3 mL by nebulization 4 times a day as needed for shortness of breath or wheezing.      levothyroxine (Synthroid, Levoxyl) 150 mcg tablet TAKE 1 TABLET BY  "MOUTH ONCE DAILY 90 tablet 3    lidocaine-prilocaine (Emla) 2.5-2.5 % cream Apply 1 application once a day as needed 60 g 6    mepolizumab (Nucala) 100 mg/mL auto-injector Inject 100 mg (1 pen) under the skin every 4 weeks as directed 1 mL 12    mepolizumab (Nucala) 100 mg/mL auto-injector Inject 100 mg (1 pen) under the skin every 4 weeks as directed 1 mL 28    montelukast (Singulair) 10 mg tablet TAKE 1 TABLET BY MOUTH EVERY DAY 90 tablet 3    montelukast (Singulair) 10 mg tablet TAKE 1 TABLET BY MOUTH EVERY DAY 90 tablet 3    MULTIVITAMIN ORAL Take 1 tablet by mouth once daily.      omega 3-dha-epa-fish oil (Fish OiL) 1,200 (144-216) mg capsule Take 1 capsule (1,200 mg) by mouth once daily.      omeprazole (PriLOSEC) 40 mg DR capsule Take 1 capsule (40 mg) by mouth once daily in the morning. Take before meals. Do not crush or chew. 90 capsule 3    onabotulinumtoxinA (Botox) 100 unit injection PHYSICIAN TO INJECT UP  UNITS INTRAMUSCULARLY EVERY 90 DAYS. FOR OFFICE USE ONLY 1 each 3    pemivibart (Pemgarda, EUA,) 125 mg/mL solution Providers office to inject 4500 Milligram(s) intravenously every 12 weeks. For  only. 36 mL 3    potassium &magnesium aspartate (magnesium, potassium aspartate) 250-250 mg capsule Take 2 capsules by mouth once daily.      predniSONE (Deltasone) 10 mg tablet Take 4 tabs by mouth daily for 2 days, then 3 tabs daily for 2 days, then 2 tabs daily for 2 days, then 1 tab daily for 2 days 20 tablet 0    RESVERATROL ORAL Take 1,450 mg by mouth once daily.      rizatriptan (Maxalt) 10 mg tablet TAKE 1 TABLET BY MOUTH AT BEGINNING OF MIGRAINE. TAKE 1 BY MOUTH EVERY 2 HOURS IF MIGRAINE NOT RESOLVED. MAXIMUM OF 3 TABLET BY MOUTH IN 24 HOURS 45 tablet 11    sulfamethoxazole-trimethoprim (Bactrim DS) 800-160 mg tablet Take 1 tablet(s) by mouth once daily 90 tablet 3    syringe with needle (BD Luer-Britni Syringe) 3 mL 25 x 5/8\" syringe Use as directed for B12 injections 2 each " "11    syringe, disposable, (Monoject Tuberculin Syringe) 1 mL syringe use 2 syringe(s) once a month 2 each 11    turmeric 400 mg capsule Take by mouth.      [DISCONTINUED] blood sugar diagnostic (Accu-Chek Zakiya Plus test strp) strip Use one strip 3 times daily as directed 300 strip 3    [DISCONTINUED] EPINEPHrine (Epipen) 0.3 mg/0.3 mL injection syringe USE AS NEEDED AS DIRECTED 10 each 6    [DISCONTINUED] fluticasone-umeclidin-vilanter (TRELEGY-ELLIPTA) 200-62.5-25 mcg blister with device INHALE 1 PUFF BY MOUTH ONCE DAILY 60 each 11    [DISCONTINUED] pen needle, diabetic (BD Cici 2nd Gen Pen Needle) 32 gauge x 5/32\" needle Use 1 pen needle to inject insulin under the skin 3 times a day before meals 300 each 3    [DISCONTINUED] tirzepatide (Mounjaro) 12.5 mg/0.5 mL pen injector Inject 12.5 mg under the skin 1 (one) time per week. 2 mL 2     No current facility-administered medications on file prior to visit.        HISTORICAL PHARMACOTHERAPY  -Metformin and Novolog (replaced with Mounjaro)    CURRENT PHARMACOTHERAPY  -Mounjaro 12.5mg subcutaneous once weekly    Secondary Prevention:  Statin? No  ACE-I/ARB? No    Glucose Readings:  Glucometer/CGM Type: Accu check Zakiya - outdated meter   Patient tests BG a few times per week    Current home BG readings:  AM FBG   Any episodes of hypoglycemia? No Did patient treat episode of hypoglycemia appropriately? 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 Enrollment should be active in 2-4 weeks.  - Requested VB[employer] enrollment date: 12/10.24  -  Pharmacy fill location: [place]    Assessment/Plan   Problem List Items Addressed This Visit       Diabetes mellitus type 2 without retinopathy (Multi) - Primary    Relevant Medications    tirzepatide (Mounjaro) 12.5 mg/0.5 mL pen injector (Start on 12/15/2024)    " blood sugar diagnostic (Accu-Chek Guide test strips) strip    blood-glucose meter (Accu-Chek Guide Glucose Meter) misc    Autolet (Accu-Chek Soft Dev Lancets) lancing device    lancets misc    Other Relevant Orders    Albumin-Creatinine Ratio, Urine Random    Class 2 severe obesity due to excess calories with serious comorbidity and body mass index (BMI) of 38.0 to 38.9 in adult    Relevant Medications    tirzepatide (Mounjaro) 12.5 mg/0.5 mL pen injector (Start on 12/15/2024)      Patient's goal A1c is < 7%.  Is pt at goal? Yes, 5.3%    Medication Changes:  CONTINUE  Mounjaro 12.5mg subcutaneous once weekly   Home glucose meter is outdated. Updated orders sent for Accu Chek Guide.    Secondary prevention:  LDL not at goal < 70 however has made significant diet changes so may consider repeat lipid panel. PMH myalgia with atorvastatin.   No history of proteinuria screening. Agreeable to obtain urine albumin with other lab work next month.     Monitoring and Education:   Counseled on medication MOA, administration, side effects, monitoring  Blood sugar goals  Fastin - 130 mg/dL  2 hours after meal: less than 180 mg/dL  A1c: less than 7%     Follow up: 1 year for VBID renewal     Continue all meds under the continuation of care with the referring provider and clinical pharmacy team.    Ayla Joel, Faisal     Verbal consent to manage patient's drug therapy was obtained from [the patient and/or an individual authorized to act on behalf of a patient]. They were informed they may decline to participate or withdraw from participation in pharmacy services at any time.

## 2024-12-11 ENCOUNTER — PHARMACY VISIT (OUTPATIENT)
Dept: PHARMACY | Facility: CLINIC | Age: 64
End: 2024-12-11
Payer: COMMERCIAL

## 2024-12-12 ENCOUNTER — PHARMACY VISIT (OUTPATIENT)
Dept: PHARMACY | Facility: CLINIC | Age: 64
End: 2024-12-12
Payer: COMMERCIAL

## 2024-12-13 ENCOUNTER — SPECIALTY PHARMACY (OUTPATIENT)
Dept: PHARMACY | Facility: CLINIC | Age: 64
End: 2024-12-13

## 2024-12-16 ENCOUNTER — APPOINTMENT (OUTPATIENT)
Dept: NEUROLOGY | Facility: CLINIC | Age: 64
End: 2024-12-16
Payer: COMMERCIAL

## 2024-12-16 VITALS — BODY MASS INDEX: 35.58 KG/M2 | WEIGHT: 234 LBS

## 2024-12-16 DIAGNOSIS — G51.32 CLONIC HEMIFACIAL SPASM OF MUSCLE OF LEFT SIDE OF FACE: Primary | ICD-10-CM

## 2024-12-16 PROCEDURE — 64612 DESTROY NERVE FACE MUSCLE: CPT | Performed by: PSYCHIATRY & NEUROLOGY

## 2024-12-16 NOTE — PROGRESS NOTES
Neurology Botulinum Injection    Subjective   Lara Morris is an 64 y.o. female here for medical botulinum injection for Clonic hemifacial spasm of muscle of left side of face [G51.32].        Injections worked well last time. Partially worn off.        Mild L lower facial weakness once, but it did not happen this time.          PMHx   CVID , taking IVIG for 12 years   Asthma  osteoarthritis   B12 deficiency   bilateral hearing impairment   wears CPAP   diaphragmatic pacemaker placed     PSHx  Bilateral knee replacement      Objective   Neurological Exam  Physical Exam  ProceduresroceduresPrior to the start of the procedure a time out was taken and the following were verified: the identity of the patient using two patient identifiers (name, ) - this was verified by the patient      The area was prepped in the usual sterile fashion. Botulinum toxin type A (Botox)was injected as follows:    Dose Sites Muscle  5 units 1 left superomedial orbicularis ocularis   5 units 1 left superolateral orbicularis ocularis   5 units 1 left lateral orbicularis ocularis   5 units 1 left Inferior lateral orbicularis ocularis   5 units 1 left Inferior orbicularis ocularis   3 units 1 left zygomaticus   1 unit 1 left risorius  1 unit 1 left upper orbicularis oris    10 Unit        Lt platysma        Patient supplied Botox.  Total botulinum toxin used was 40 units, 60 discarded, patient supplied 100 units     Assessment/Plan hemifacial spasm

## 2024-12-18 ENCOUNTER — HOME INFUSION (OUTPATIENT)
Dept: INFUSION THERAPY | Age: 64
End: 2024-12-18
Payer: COMMERCIAL

## 2024-12-18 ENCOUNTER — DOCUMENTATION (OUTPATIENT)
Dept: INFUSION THERAPY | Age: 64
End: 2024-12-18
Payer: COMMERCIAL

## 2024-12-18 PROCEDURE — RXMED WILLOW AMBULATORY MEDICATION CHARGE

## 2024-12-18 RX ORDER — RIZATRIPTAN BENZOATE 10 MG/1
TABLET ORAL
Qty: 45 TABLET | Refills: 11 | Status: CANCELLED | OUTPATIENT
Start: 2024-12-18 | End: 2025-12-17

## 2024-12-18 NOTE — PROGRESS NOTES
Review of chart. Patient infuses Hizentra 24gm subcutaneously once weekly.  Dr Richey follows outpatient. New RX was received on 10/30/24 with 6 refills. Patient to  next delivery on 12/18- this will be second fill on new RX.    Auth is good thru 7/25 December FOTM is complete. Paid claims obtained.  Taya kit remains in date     Processed fill for 8 x hizentra 10gm and 4 x hizentra 4gm vials for patient  12/18/24 to cover 12/25/24 thru 1/21/25.     Follow up 1/14 to determine next /delivery date with patient and check supply needs.

## 2024-12-19 ENCOUNTER — PHARMACY VISIT (OUTPATIENT)
Dept: PHARMACY | Facility: CLINIC | Age: 64
End: 2024-12-19
Payer: COMMERCIAL

## 2024-12-23 PROCEDURE — RXMED WILLOW AMBULATORY MEDICATION CHARGE

## 2024-12-26 ENCOUNTER — PHARMACY VISIT (OUTPATIENT)
Dept: PHARMACY | Facility: CLINIC | Age: 64
End: 2024-12-26
Payer: COMMERCIAL

## 2024-12-31 ENCOUNTER — SPECIALTY PHARMACY (OUTPATIENT)
Dept: PHARMACY | Facility: CLINIC | Age: 64
End: 2024-12-31

## 2024-12-31 PROCEDURE — RXMED WILLOW AMBULATORY MEDICATION CHARGE

## 2025-01-06 PROCEDURE — RXMED WILLOW AMBULATORY MEDICATION CHARGE

## 2025-01-08 ENCOUNTER — PHARMACY VISIT (OUTPATIENT)
Dept: PHARMACY | Facility: CLINIC | Age: 65
End: 2025-01-08
Payer: COMMERCIAL

## 2025-01-15 ENCOUNTER — HOME INFUSION (OUTPATIENT)
Dept: INFUSION THERAPY | Age: 65
End: 2025-01-15
Payer: COMMERCIAL

## 2025-01-15 ENCOUNTER — PHARMACY VISIT (OUTPATIENT)
Dept: PHARMACY | Facility: CLINIC | Age: 65
End: 2025-01-15
Payer: COMMERCIAL

## 2025-01-16 ENCOUNTER — APPOINTMENT (OUTPATIENT)
Dept: PRIMARY CARE | Facility: CLINIC | Age: 65
End: 2025-01-16
Payer: COMMERCIAL

## 2025-01-16 NOTE — PROGRESS NOTES
Review of chart. Patient infuses Hizentra 24gm subcutaneously once weekly.  Dr Richey follows outpatient. New RX was received on 10/30/24 with 6 refills. Patient to  next delivery on 1/17 by 11am- this will be third fill on new RX.     Auth is good thru 7/25 Jan FOTM is complete. Paid claims obtained but forwarded to Pharmacy manager regarding copay clarification.  Taya kit had epipen expiring. Will replace with this fill. Paid claim at $15 copay .     Processed fill for 8 x hizentra 10gm and 4 x hizentra 4gm vials plus 1 x epinephrine pen for patient  1/17/24 to cover 1/22/24 thru 2/18/25.     Follow up 2/10 to determine next /delivery date with patient and check supply needs.

## 2025-01-17 ENCOUNTER — DOCUMENTATION (OUTPATIENT)
Dept: PHARMACY | Facility: CLINIC | Age: 65
End: 2025-01-17

## 2025-01-17 NOTE — PROGRESS NOTES
SPOKE W/ PT - DELIVERY IS SCHEDULED FOR FRIDAY BY 3 PM.  ASKED PT IF THERE ARE ANY QUESTIONS TO A PHARMACIST. PT SAID: NO QUESTIONS.

## 2025-01-20 PROCEDURE — RXMED WILLOW AMBULATORY MEDICATION CHARGE

## 2025-01-22 PROCEDURE — RXMED WILLOW AMBULATORY MEDICATION CHARGE

## 2025-01-23 ENCOUNTER — APPOINTMENT (OUTPATIENT)
Dept: PRIMARY CARE | Facility: CLINIC | Age: 65
End: 2025-01-23
Payer: COMMERCIAL

## 2025-01-23 ENCOUNTER — SPECIALTY PHARMACY (OUTPATIENT)
Dept: PHARMACY | Facility: CLINIC | Age: 65
End: 2025-01-23

## 2025-01-27 ENCOUNTER — PHARMACY VISIT (OUTPATIENT)
Dept: PHARMACY | Facility: CLINIC | Age: 65
End: 2025-01-27
Payer: COMMERCIAL

## 2025-01-30 ENCOUNTER — SPECIALTY PHARMACY (OUTPATIENT)
Dept: PHARMACY | Facility: CLINIC | Age: 65
End: 2025-01-30

## 2025-01-30 PROCEDURE — RXMED WILLOW AMBULATORY MEDICATION CHARGE

## 2025-01-31 ENCOUNTER — APPOINTMENT (OUTPATIENT)
Dept: RHEUMATOLOGY | Facility: CLINIC | Age: 65
End: 2025-01-31
Payer: COMMERCIAL

## 2025-01-31 VITALS
DIASTOLIC BLOOD PRESSURE: 60 MMHG | BODY MASS INDEX: 36.43 KG/M2 | OXYGEN SATURATION: 98 % | TEMPERATURE: 97.9 F | WEIGHT: 240.4 LBS | SYSTOLIC BLOOD PRESSURE: 107 MMHG | HEIGHT: 68 IN | HEART RATE: 68 BPM

## 2025-01-31 DIAGNOSIS — M19.90 ARTHRITIS: Primary | ICD-10-CM

## 2025-01-31 PROCEDURE — 1159F MED LIST DOCD IN RCRD: CPT | Performed by: INTERNAL MEDICINE

## 2025-01-31 PROCEDURE — 1125F AMNT PAIN NOTED PAIN PRSNT: CPT | Performed by: INTERNAL MEDICINE

## 2025-01-31 PROCEDURE — 3074F SYST BP LT 130 MM HG: CPT | Performed by: INTERNAL MEDICINE

## 2025-01-31 PROCEDURE — 3008F BODY MASS INDEX DOCD: CPT | Performed by: INTERNAL MEDICINE

## 2025-01-31 PROCEDURE — 99214 OFFICE O/P EST MOD 30 MIN: CPT | Performed by: INTERNAL MEDICINE

## 2025-01-31 PROCEDURE — 3078F DIAST BP <80 MM HG: CPT | Performed by: INTERNAL MEDICINE

## 2025-01-31 ASSESSMENT — PAIN SCALES - GENERAL: PAINLEVEL_OUTOF10: 4

## 2025-02-02 NOTE — PROGRESS NOTES
She complains of pain in the upper back bilaterally as well as pain and some locking in the third digit of the right hand.  She continues to have pain in her right hip.  She did not have significant pain relief following intra-articular injection to the right hip done 5/21/2024.  She is planning to follow-up with orthopedic surgeon for further management of the right hip pain.    There is tenderness in the supraspinatus muscles bilaterally with preserved range of motion of the shoulders without joint effusion.  There is mild pain in the right hip with roll testing.  The straight leg raise test is normal bilaterally in the seated position.  There are no joint effusions.  There is tenderness and volar aspect of the MCP joint of the third digit of the right hand.  There are some locking of the third digit of the right hand.    X-ray pelvis and hips (11//2024) mild degenerative change of the right hip.    Patient has bilateral diaphragmatic paralysis, right greater than left, obstructive sleep apnea, CVID, hypothyroidism, cervical and lumbar spondylosis, osteoarthritis of the right hip, right greater trochanteric bursitis, flexor tendinitis of the third digit of the right hand, aortic stenosis.    She is to continue doing stretching exercises to the digits of both hands.  She was given a local injection of triamcinolone 20 mg with 1 mL of 1% lidocaine to the region of tenderness in the supraspinatus muscles bilaterally.  She is to return at the next available office appointment.

## 2025-02-05 ENCOUNTER — PHARMACY VISIT (OUTPATIENT)
Dept: PHARMACY | Facility: CLINIC | Age: 65
End: 2025-02-05
Payer: COMMERCIAL

## 2025-02-05 ENCOUNTER — HOSPITAL ENCOUNTER (OUTPATIENT)
Dept: RADIOLOGY | Facility: CLINIC | Age: 65
Discharge: HOME | End: 2025-02-05
Payer: COMMERCIAL

## 2025-02-05 DIAGNOSIS — M19.90 ARTHRITIS: ICD-10-CM

## 2025-02-05 PROCEDURE — 73700 CT LOWER EXTREMITY W/O DYE: CPT | Mod: RT

## 2025-02-07 ENCOUNTER — HOME INFUSION (OUTPATIENT)
Dept: INFUSION THERAPY | Age: 65
End: 2025-02-07
Payer: COMMERCIAL

## 2025-02-07 NOTE — PROGRESS NOTES
Review of chart. Patient infuses Hizentra 24gm subcutaneously once weekly.  Dr Richey follows outpatient. New RX was received on 10/30/24 with 6 refills. This fill will be the fourth fill.     Auth is good thru 7/20/25  Feb FOTM is complete. Paid claims obtained - $75 copay per Hizentra strength billed to copay card. Per manager, copay assist needs a PA number generated for each claim. Pt aware.    Spoke to patient, requesting pickup on 2/14/25 approx noon.     Processed fill for 8 x hizentra 10gm and 4 x hizentra 4gm vials plus 1 x epinephrine pen for patient  2/14/24 to cover 2/19/25 thru 3/18/25.     Follow up 3/7 to determine next /delivery date with patient and check supply needs.

## 2025-02-08 DIAGNOSIS — G25.81 RLS (RESTLESS LEGS SYNDROME): ICD-10-CM

## 2025-02-10 ENCOUNTER — DOCUMENTATION (OUTPATIENT)
Dept: INFUSION THERAPY | Age: 65
End: 2025-02-10
Payer: COMMERCIAL

## 2025-02-10 RX ORDER — GABAPENTIN 300 MG/1
600 CAPSULE ORAL NIGHTLY
Qty: 180 CAPSULE | Refills: 0 | Status: SHIPPED | OUTPATIENT
Start: 2025-02-10 | End: 2025-05-15

## 2025-02-10 NOTE — PROGRESS NOTES
Ticket made from pharmacist note:    Patient will  Friday 2/14/25 by 11 am.      Sending standard supplies for 4 doses of Hizentra delivered subcutaneously  via [place] [address] . Sending 2 extra sets of tubing per patient request.     Patient previously spoke with a pharmacist.

## 2025-02-11 ENCOUNTER — PATIENT MESSAGE (OUTPATIENT)
Dept: CARE COORDINATION | Facility: CLINIC | Age: 65
End: 2025-02-11
Payer: COMMERCIAL

## 2025-02-11 DIAGNOSIS — M19.90 ARTHRITIS: Primary | ICD-10-CM

## 2025-02-13 PROCEDURE — RXMED WILLOW AMBULATORY MEDICATION CHARGE

## 2025-02-14 ENCOUNTER — PHARMACY VISIT (OUTPATIENT)
Dept: PHARMACY | Facility: CLINIC | Age: 65
End: 2025-02-14
Payer: COMMERCIAL

## 2025-02-14 ENCOUNTER — SPECIALTY PHARMACY (OUTPATIENT)
Dept: PHARMACY | Facility: CLINIC | Age: 65
End: 2025-02-14

## 2025-02-14 NOTE — PROGRESS NOTES
"[facility] Clinical Note  Patient Reassessment     Introduction  Lara Morris is a 64 y.o. female who is on the specialty pharmacy service for management of: Asthma Core.    [facility] supplied medication: Nucala 100mg under the skin every 4 weeks.    Duration of therapy: Maintenance    Pharmacy will continue to collaborate in the care of this patient with the referring prescriber Emilie Richey MD.    Discussion  Lara was contacted on 2/14/2025 at 10:30 AM for a pharmacy visit with encounter number [phone] from:   [facility]  [address] [place] [id number]  Dept: [phone]  Dept Fax: [phone]  Lara consented to a Telephone visit, which was performed.    Efficacy  Patient has developed new symptoms of condition: No  Patient/caregiver feels medication is affecting the disease state: Doing well. Stated only needs rescue inhaler when it is very cold or from cleaning.    Goals  Provided education on goals and possible outcomes of therapy:  Adherence with therapy  Timely completion of appropriate labs  Timely and appropriate follow up with provider  Identify and address medication interactions with presciption medications, OTC medications and supplements  Optimize or maintain quality of life  Asthma/Immunology: Improve FEV1 (asthma, COPD target)  Reduce frequency of asthma symptoms such as coughing/wheezing, shortness of breath, and chest tightness  Reduce need for \"prn\" inhalers (asthma)  Patient has documented target(s) for goals of therapy: No  Patient status for goal(s): On track    Tolerance  Patient has experienced side effects from this medication: No  Changes to current therapy regimen: No    The follow-up timeline was discussed. Every person responds to and reacts to therapy differently. Patient should be assessed for efficacy and tolerability in approximately: 6 months    Adherence  Patient Information  Informant: Self " (Patient)  Demonstrates Understanding of Importance of Adherence: Yes  Does the patient have any barriers to self-administration (including physical and mental?): No  Support Network for Adherence: Healthcare Provider  Adherence Tools Used: Calendar  Medication Information  Medication: mepolizumab (Nucala)  Patient Reported Missed Doses in the Last 4 Weeks: 0  Estimated Medication Adherence Level: Good  Adherence Estimation Source: Claims history  Barriers to Adherence: No Problems identified   The importance of adherence was discussed and patient/caregiver was advised to take the medication as prescribed by their provider. Encouraged patient/caregiver to call physician's office or specialty pharmacy if they have a question regarding a missed dose.    General Assessment  Changes to home medications, OTCs or supplements: No  Current Outpatient Medications   Medication Sig Dispense Refill    acetaminophen (Tylenol) 325 mg tablet Take 2 tablets (650 mg) by mouth every 6 hours if needed for mild pain (1 - 3).      acyclovir-hydrocortisone 5-1 % cream Xerese 5-1 % External Cream Quantity: 5 Refills: 0 DO Start : 22-Mar-2018 Active      albuterol 90 mcg/actuation inhaler Inhale 2 puff by mouth every 4 hours as needed Inhalation 54 g 6    albuterol 90 mcg/actuation inhaler Inhale 2 puffs every 4 hours as needed 54 g 6    Autolet (Accu-Chek Soft Dev Lancets) lancing device Use as instructed to test blood sugar once daily 1 each 0    azelastine (Astelin) 137 mcg (0.1 %) nasal spray USE 1-2 SPRAYS IN EACH NOSTRIL ONCE DAILY TO TWO TIMES A DAY AS NEEDED FOR NASAL DRAINAGE 90 mL 3    blood sugar diagnostic (Accu-Chek Guide test strips) strip 1 each once daily. 100 each 0    blood-glucose meter (Accu-Chek Guide Glucose Meter) misc 1 each once daily. 1 each 0    calcium citrate (Calcitrate) 200 mg (950 mg) tablet Take 1 tablet (200 mg) by mouth once daily.      cetirizine (ZyrTEC) 10 mg tablet Take 1 tablet (10 mg) by mouth 2  times a day.      cholecalciferol (Vitamin D-3) 25 MCG (1000 UT) tablet Take 1 tablet (25 mcg) by mouth once daily.      clobetasol (Temovate) 0.05 % cream Apply 1 application twice daily as needed for rash. 60 g 1    clobetasol (Temovate) 0.05 % ointment Apply 1 application 2 times daily for 2 weeks and then as needed for 2 weeks. 45 g 2    cyanocobalamin (Vitamin B-12) 1,000 mcg/mL injection Inject 1 mL into the muscle every 3 weeks. 3 mL 4    cyanocobalamin (Vitamin B-12) 1,000 mcg/mL injection Inject 1 mL (1000 mcg) into the muscle every 3 weeks 3 mL 4    eletriptan (Relpax) 40 mg tablet Take 1 tablet at onset of headache. May repeat in 2 hours one time. 30 tablet 17    epinastine (Elestat) 0.05 % ophthalmic solution Instill 1 drop into affected eye twice daily as needed 5 mL 2    EPINEPHrine (EpiPen 2-Korey) 0.3 mg/0.3 mL injection syringe Use intramuscularly as needed for allergic reaction. 10 each 6    esomeprazole (NexIUM) 40 mg DR capsule TAKE 1 CAPSULE BY MOUTH EVERY MORNING BEFORE MEALS 90 capsule 3    ezetimibe (Zetia) 10 mg tablet Take 1 tablet (10 mg) by mouth once daily. 90 tablet 3    ferrous fumarate-vitamin C (Romulo-Sequeles 65-25) Take 1 tablet by mouth 3 times daily (morning, midday, late afternoon). Do not crush, chew, or split.      fluticasone (Flonase) 50 mcg/actuation nasal spray Administer 2 sprays into each nostril once daily.      fluticasone-umeclidin-vilanter (Trelegy Ellipta) 100-62.5-25 mcg blister with device Inhale 1 puff by mouth daily 60 each 11    gabapentin (Neurontin) 100 mg capsule Take 3 capsules (300 mg) by mouth once daily. Take 1 capsule every morning.  Continue taking the gabapentin 300 mg capsule every evening 90 capsule 2    gabapentin (Neurontin) 300 mg capsule Take 2 capsules (600 mg) by mouth once daily at bedtime. 180 capsule 0    ginseng 100 mg capsule Take 100 mg by mouth once daily.      ibuprofen 800 mg tablet Take 1 tablet (800 mg) by mouth 2 times a day as  needed for moderate pain (4 - 6). 180 tablet 1    immune globulin, human, (Hizentra) subcutaneous infusion Inject 120 mL (24 g) under the skin 1 (one) time per week for 24 doses. 3600 mL 0    ipratropium-albuteroL (Duo-Neb) 0.5-2.5 mg/3 mL nebulizer solution Take 3 mL by nebulization 4 times a day as needed for shortness of breath or wheezing.      lancets misc Test blood sugar once daily 100 each 3    levothyroxine (Synthroid, Levoxyl) 150 mcg tablet TAKE 1 TABLET BY MOUTH ONCE DAILY 90 tablet 3    lidocaine-prilocaine (Emla) 2.5-2.5 % cream Apply 1 application once a day as needed 60 g 6    mepolizumab (Nucala) 100 mg/mL auto-injector Inject 100 mg (1 pen) under the skin every 4 weeks as directed 1 mL 12    mepolizumab (Nucala) 100 mg/mL auto-injector Inject 100 mg (1 pen) under the skin every 4 weeks as directed 1 mL 28    montelukast (Singulair) 10 mg tablet TAKE 1 TABLET BY MOUTH EVERY DAY 90 tablet 3    montelukast (Singulair) 10 mg tablet TAKE 1 TABLET BY MOUTH EVERY DAY 90 tablet 3    MULTIVITAMIN ORAL Take 1 tablet by mouth once daily.      omega 3-dha-epa-fish oil (Fish OiL) 1,200 (144-216) mg capsule Take 1 capsule (1,200 mg) by mouth once daily.      omeprazole (PriLOSEC) 40 mg DR capsule Take 1 capsule (40 mg) by mouth once daily in the morning. Take before meals. Do not crush or chew. 90 capsule 3    onabotulinumtoxinA (Botox) 100 unit injection PHYSICIAN TO INJECT UP  UNITS INTRAMUSCULARLY EVERY 90 DAYS. FOR OFFICE USE ONLY 1 each 3    pemivibart (Pemgarda, EUA,) 125 mg/mL solution Providers office to inject 4500 Milligram(s) intravenously every 12 weeks. For  only. 36 mL 3    potassium &magnesium aspartate (magnesium, potassium aspartate) 250-250 mg capsule Take 2 capsules by mouth once daily.      predniSONE (Deltasone) 10 mg tablet Take 4 tabs by mouth daily for 2 days, then 3 tabs daily for 2 days, then 2 tabs daily for 2 days, then 1 tab daily for 2 days 20 tablet 0     "RESVERATROL ORAL Take 1,450 mg by mouth once daily.      rizatriptan (Maxalt) 10 mg tablet TAKE 1 TABLET BY MOUTH AT BEGINNING OF MIGRAINE. TAKE 1 BY MOUTH EVERY 2 HOURS IF MIGRAINE NOT RESOLVED. MAXIMUM OF 3 TABLET BY MOUTH IN 24 HOURS 45 tablet 11    sulfamethoxazole-trimethoprim (Bactrim DS) 800-160 mg tablet Take 1 tablet(s) by mouth once daily 90 tablet 3    syringe with needle (BD Luer-Britni Syringe) 3 mL 25 x 5/8\" syringe Use as directed for B12 injections 2 each 11    syringe, disposable, (Monoject Tuberculin Syringe) 1 mL syringe use 2 syringe(s) once a month 2 each 11    tirzepatide (Mounjaro) 12.5 mg/0.5 mL pen injector Inject 12.5 mg under the skin 1 (one) time per week. 6 mL 3    turmeric 400 mg capsule Take by mouth.       No current facility-administered medications for this visit.     Reported new allergies: No  Reported new medical conditions: No  Additional monitoring reviewed: No  Is laboratory follow up needed? Per MD - unable to assess PFT as records not available in [facility]/outside  provider    Advised to contact the pharmacy if there are any changes to the patient's medication list, including prescriptions, OTC medications, herbal products, or supplements.    Impression/Plan  This patient has been identified as high risk due to Geriatric (over 65 years of age).  The following action was taken: Patient/caregiver encouraged to participate in patient management program.    QOL/Patient Satisfaction  Rate your quality of life on scale of 1-10: 8  Rate your satisfaction with  Specialty Pharmacy on scale of 1-10: 8    Provided contact information ([phone]) for [facility] and reviewed dispensing process, refill timeline and patient management follow up. Confirmed understanding of education conducted during assessment. All questions and concerns were addressed and patient/caregiver was encouraged to reach out for additional questions or concerns.    Based on the " patient's diagnosis, medication list, progress towards goals, adherence, tolerance, and medication list, medication remains appropriate: Therapy remains appropriate (I attest)    Lizet Holguin, PharmD

## 2025-02-18 ENCOUNTER — OFFICE VISIT (OUTPATIENT)
Dept: ORTHOPEDIC SURGERY | Facility: CLINIC | Age: 65
End: 2025-02-18
Payer: COMMERCIAL

## 2025-02-18 ENCOUNTER — HOSPITAL ENCOUNTER (OUTPATIENT)
Dept: RADIOLOGY | Facility: CLINIC | Age: 65
Discharge: HOME | End: 2025-02-18
Payer: COMMERCIAL

## 2025-02-18 DIAGNOSIS — M16.11 PRIMARY LOCALIZED OSTEOARTHRITIS OF RIGHT HIP: ICD-10-CM

## 2025-02-18 DIAGNOSIS — M19.90 ARTHRITIS: ICD-10-CM

## 2025-02-18 PROCEDURE — 1159F MED LIST DOCD IN RCRD: CPT | Performed by: STUDENT IN AN ORGANIZED HEALTH CARE EDUCATION/TRAINING PROGRAM

## 2025-02-18 PROCEDURE — 99204 OFFICE O/P NEW MOD 45 MIN: CPT | Performed by: STUDENT IN AN ORGANIZED HEALTH CARE EDUCATION/TRAINING PROGRAM

## 2025-02-18 PROCEDURE — RXMED WILLOW AMBULATORY MEDICATION CHARGE

## 2025-02-18 PROCEDURE — 1036F TOBACCO NON-USER: CPT | Performed by: STUDENT IN AN ORGANIZED HEALTH CARE EDUCATION/TRAINING PROGRAM

## 2025-02-18 PROCEDURE — 73502 X-RAY EXAM HIP UNI 2-3 VIEWS: CPT | Mod: RT

## 2025-02-18 PROCEDURE — 99214 OFFICE O/P EST MOD 30 MIN: CPT | Performed by: STUDENT IN AN ORGANIZED HEALTH CARE EDUCATION/TRAINING PROGRAM

## 2025-02-18 RX ORDER — GABAPENTIN 100 MG/1
100 CAPSULE ORAL DAILY
Qty: 90 CAPSULE | Refills: 1 | Status: SHIPPED | OUTPATIENT
Start: 2025-02-18 | End: 2026-02-18

## 2025-02-18 NOTE — PROGRESS NOTES
PRIMARY CARE PHYSICIAN: Chan Hernandez MD  REFERRING PROVIDER: No referring provider defined for this encounter.       SUBJECTIVE  CHIEF COMPLAINT:   Chief Complaint   Patient presents with    Right Hip - New Patient Visit, Pain        HPI: Lara Morris is a pleasant 64 y.o. year-old female who is seen today for evaluation of right hip pain. The pain has been present for months. The onset of pain was not associated with a traumatic injury.  Lara Morris states that the pain is located in the groin/anterior thigh.  Lara Morris denies any history of inflammatory arthritis.  The pain is aggravated by activity and alleviated by rest.  She still able to put on shoes and socks but has to modify her technique in order to do so.  The patient denies any numbness or tingling of the right lower extremity.    FUNCTIONAL STATUS: occasionally limited.  AMBULATORY STATUS: Independent community ambulation without devices  PREVIOUS TREATMENTS: physical therapy, activity modification, over the counter medications, and corticosteroid injections   HISTORY OF SURGERY ON AFFECTED HIP(S): No   BACK PAIN REPORTED: Yes   SYMPTOMS INTERFERING WITH SLEEP: No   INSTABILITY: Yes   IMPACTING QUALITY OF LIFE: Yes     Past medical history significant for common variable immunodeficiency on IVIG, paralyzed diaphragm status post stimulator.    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    REVIEW OF SYSTEMS  The patient denies any fever, chills, chest pain, shortness of breath or difficulty breathing.  Patient denies any numbness, tingling, or radicular symptoms.  Adult patient history sheet was filled out by the patient today in clinic and will be scanned into the EMR.  I personally reviewed this form which will be scanned into the EMR.  This includes Past Medical History, Past Surgical History, Medications, Allergies, Social History, Family History and 12 point review of systems.    Past Medical History:   Diagnosis Date     Asthma     Diaphragmatic stimulation by cardiac pacemaker     Hyperlipidemia     Hypothyroid     Incisional hernia without obstruction or gangrene 01/22/2019    Incisional hernia, without obstruction or gangrene    Noninfective gastroenteritis and colitis, unspecified 04/22/2019    Ileitis    Personal history of other diseases of the circulatory system 01/06/2021    History of hypertension    Personal history of other diseases of the digestive system 06/07/2018    History of umbilical hernia    Personal history of other diseases of the nervous system and sense organs     History of peripheral neuropathy    Thyrotoxicosis, unspecified without thyrotoxic crisis or storm 01/25/2014    Hyperthyroidism        Allergies   Allergen Reactions    Adhesive Hives     All adhesives (including clonidine patches) caused blistering hives    Gamunex-C [Immune Globul G-Gly-Iga Avg 46] Anaphylaxis    Immune Globulin Hives and Shortness of breath    Immune Globulin,Gamma (Igg) Human Hives and Shortness of breath    Animal Dander Runny nose     Sneezing    Atorvastatin Myalgia    Codeine Nausea/vomiting    House Dust Runny nose     Sneezing    Ropinirole Hives    Tramadol Nausea/vomiting    Ciprofloxacin Hives and Rash    Erythromycin Hives and Rash        Past Surgical History:   Procedure Laterality Date    COLONOSCOPY  11/04/2013    Complete Colonoscopy    GALLBLADDER SURGERY  11/03/2016    Gallbladder Surgery    HERNIA REPAIR  12/13/2016    Inguinal Hernia Repair    HYSTERECTOMY  11/03/2016    Hysterectomy    THYROID SURGERY  11/03/2016    Thyroid Surgery    TONSILLECTOMY  11/03/2016    Tonsillectomy With Adenoidectomy    TOTAL KNEE ARTHROPLASTY  06/07/2020    Total Knee Arthroplasty    VENTRAL HERNIA REPAIR  09/17/2018    Ventral Hernia Repair        Family History   Problem Relation Name Age of Onset    Multiple sclerosis Mother      Coronary artery disease Father      Diabetes Father      Hypertension Father      Cystic  fibrosis Sister          with gi manifestations    Cystic fibrosis Brother      Cystic fibrosis Son      Cystic fibrosis Other paternal relatives         Social History     Socioeconomic History    Marital status: Significant Other     Spouse name: Not on file    Number of children: Not on file    Years of education: Not on file    Highest education level: Not on file   Occupational History    Not on file   Tobacco Use    Smoking status: Former     Types: Cigarettes     Passive exposure: Past    Smokeless tobacco: Never   Vaping Use    Vaping status: Never Used   Substance and Sexual Activity    Alcohol use: Never    Drug use: Never    Sexual activity: Defer     Birth control/protection: Female Sterilization   Other Topics Concern    Not on file   Social History Narrative    Not on file     Social Drivers of Health     Financial Resource Strain: Not on file   Food Insecurity: Not on file   Transportation Needs: Not on file   Physical Activity: Not on file   Stress: Not on file   Social Connections: Not on file   Intimate Partner Violence: Not on file   Housing Stability: Not on file        CURRENT MEDICATIONS:   Current Outpatient Medications   Medication Sig Dispense Refill    acetaminophen (Tylenol) 325 mg tablet Take 2 tablets (650 mg) by mouth every 6 hours if needed for mild pain (1 - 3).      acyclovir-hydrocortisone 5-1 % cream Xerese 5-1 % External Cream Quantity: 5 Refills: 0 DO Start : 22-Mar-2018 Active      albuterol 90 mcg/actuation inhaler Inhale 2 puff by mouth every 4 hours as needed Inhalation 54 g 6    albuterol 90 mcg/actuation inhaler Inhale 2 puffs every 4 hours as needed 54 g 6    Autolet (Accu-Chek Soft Dev Lancets) lancing device Use as instructed to test blood sugar once daily 1 each 0    azelastine (Astelin) 137 mcg (0.1 %) nasal spray USE 1-2 SPRAYS IN EACH NOSTRIL ONCE DAILY TO TWO TIMES A DAY AS NEEDED FOR NASAL DRAINAGE 90 mL 3    blood sugar diagnostic (Accu-Chek Guide test strips)  strip 1 each once daily. 100 each 0    blood-glucose meter (Accu-Chek Guide Glucose Meter) misc 1 each once daily. 1 each 0    calcium citrate (Calcitrate) 200 mg (950 mg) tablet Take 1 tablet (200 mg) by mouth once daily.      cetirizine (ZyrTEC) 10 mg tablet Take 1 tablet (10 mg) by mouth 2 times a day.      cholecalciferol (Vitamin D-3) 25 MCG (1000 UT) tablet Take 1 tablet (25 mcg) by mouth once daily.      clobetasol (Temovate) 0.05 % cream Apply 1 application twice daily as needed for rash. 60 g 1    clobetasol (Temovate) 0.05 % ointment Apply 1 application 2 times daily for 2 weeks and then as needed for 2 weeks. 45 g 2    cyanocobalamin (Vitamin B-12) 1,000 mcg/mL injection Inject 1 mL into the muscle every 3 weeks. 3 mL 4    cyanocobalamin (Vitamin B-12) 1,000 mcg/mL injection Inject 1 mL (1000 mcg) into the muscle every 3 weeks 3 mL 4    eletriptan (Relpax) 40 mg tablet Take 1 tablet at onset of headache. May repeat in 2 hours one time. 30 tablet 17    epinastine (Elestat) 0.05 % ophthalmic solution Instill 1 drop into affected eye twice daily as needed 5 mL 2    EPINEPHrine (EpiPen 2-Korey) 0.3 mg/0.3 mL injection syringe Use intramuscularly as needed for allergic reaction. 10 each 6    esomeprazole (NexIUM) 40 mg DR capsule TAKE 1 CAPSULE BY MOUTH EVERY MORNING BEFORE MEALS 90 capsule 3    ezetimibe (Zetia) 10 mg tablet Take 1 tablet (10 mg) by mouth once daily. 90 tablet 3    ferrous fumarate-vitamin C (Romulo-Sequeles 65-25) Take 1 tablet by mouth 3 times daily (morning, midday, late afternoon). Do not crush, chew, or split.      fluticasone (Flonase) 50 mcg/actuation nasal spray Administer 2 sprays into each nostril once daily.      fluticasone-umeclidin-vilanter (Trelegy Ellipta) 100-62.5-25 mcg blister with device Inhale 1 puff by mouth daily 60 each 11    gabapentin (Neurontin) 100 mg capsule Take 3 capsules (300 mg) by mouth once daily. Take 1 capsule every morning.  Continue taking the gabapentin  300 mg capsule every evening 90 capsule 2    gabapentin (Neurontin) 300 mg capsule Take 2 capsules (600 mg) by mouth once daily at bedtime. 180 capsule 0    ginseng 100 mg capsule Take 100 mg by mouth once daily.      ibuprofen 800 mg tablet Take 1 tablet (800 mg) by mouth 2 times a day as needed for moderate pain (4 - 6). 180 tablet 1    immune globulin, human, (Hizentra) subcutaneous infusion Inject 120 mL (24 g) under the skin 1 (one) time per week for 24 doses. 3600 mL 0    ipratropium-albuteroL (Duo-Neb) 0.5-2.5 mg/3 mL nebulizer solution Take 3 mL by nebulization 4 times a day as needed for shortness of breath or wheezing.      lancets misc Test blood sugar once daily 100 each 3    levothyroxine (Synthroid, Levoxyl) 150 mcg tablet TAKE 1 TABLET BY MOUTH ONCE DAILY 90 tablet 3    lidocaine-prilocaine (Emla) 2.5-2.5 % cream Apply 1 application once a day as needed 60 g 6    mepolizumab (Nucala) 100 mg/mL auto-injector Inject 100 mg (1 pen) under the skin every 4 weeks as directed 1 mL 12    mepolizumab (Nucala) 100 mg/mL auto-injector Inject 100 mg (1 pen) under the skin every 4 weeks as directed 1 mL 28    montelukast (Singulair) 10 mg tablet TAKE 1 TABLET BY MOUTH EVERY DAY 90 tablet 3    montelukast (Singulair) 10 mg tablet TAKE 1 TABLET BY MOUTH EVERY DAY 90 tablet 3    MULTIVITAMIN ORAL Take 1 tablet by mouth once daily.      omega 3-dha-epa-fish oil (Fish OiL) 1,200 (144-216) mg capsule Take 1 capsule (1,200 mg) by mouth once daily.      omeprazole (PriLOSEC) 40 mg DR capsule Take 1 capsule (40 mg) by mouth once daily in the morning. Take before meals. Do not crush or chew. 90 capsule 3    onabotulinumtoxinA (Botox) 100 unit injection PHYSICIAN TO INJECT UP  UNITS INTRAMUSCULARLY EVERY 90 DAYS. FOR OFFICE USE ONLY 1 each 3    pemivibart (Pemgarda, EUA,) 125 mg/mL solution Providers office to inject 4500 Milligram(s) intravenously every 12 weeks. For  only. 36 mL 3    potassium  "&magnesium aspartate (magnesium, potassium aspartate) 250-250 mg capsule Take 2 capsules by mouth once daily.      predniSONE (Deltasone) 10 mg tablet Take 4 tabs by mouth daily for 2 days, then 3 tabs daily for 2 days, then 2 tabs daily for 2 days, then 1 tab daily for 2 days 20 tablet 0    RESVERATROL ORAL Take 1,450 mg by mouth once daily.      rizatriptan (Maxalt) 10 mg tablet TAKE 1 TABLET BY MOUTH AT BEGINNING OF MIGRAINE. TAKE 1 BY MOUTH EVERY 2 HOURS IF MIGRAINE NOT RESOLVED. MAXIMUM OF 3 TABLET BY MOUTH IN 24 HOURS 45 tablet 11    sulfamethoxazole-trimethoprim (Bactrim DS) 800-160 mg tablet Take 1 tablet(s) by mouth once daily 90 tablet 3    syringe with needle (BD Luer-Britni Syringe) 3 mL 25 x 5/8\" syringe Use as directed for B12 injections 2 each 11    syringe, disposable, (Monoject Tuberculin Syringe) 1 mL syringe use 2 syringe(s) once a month 2 each 11    tirzepatide (Mounjaro) 12.5 mg/0.5 mL pen injector Inject 12.5 mg under the skin 1 (one) time per week. 6 mL 3    turmeric 400 mg capsule Take by mouth.       No current facility-administered medications for this visit.        OBJECTIVE    PHYSICAL EXAM  There is no height or weight on file to calculate BMI.    General: Well-appearing female in no acute distress.  Awake, alert and oriented.  Pleasant and cooperative.  Respiratory: Non-labored breathing  Mood: Euthymic   Gait: Antalgic  Assistive Device: None     Limb Length Discrepancy: 3 -5 mm    Affected Right Hip  Range of motion:   Flexion: 90  Extension: 0  Internal Rotation: 0, pain limited  External Rotation: 20  Abduction: 20, pain limited  Hip Flexor Strength: 5/5  Abductor Strength: 5/5  Adductor Strength: 5/5  Tenderness: Trochanter  Sensation: Intact to light touch distally  Motor function: Able to fire TA, EHL, G/S  Pulses: Palpable DP pulse    IMAGING:  AP pelvis, AP hip and false profile views: Independent review of right hip and pelvis x-rays was performed. The findings were reviewed with " the patient. There are severe degenerative changes of the right hip with associated joint space narrowing, subchondral sclerosis, and osteophyte formation. No evidence of fracture, AVN, dislocation, osteomyelitis.      ASSESSMENT & PLAN    IMPRESSION:  Lara Morris is a 64 y.o. female with advanced osteoarthritis of the right hip.  The patient has tried conservative treat measures.  Previous injections have provided her with some relief.  The last injection did not provide her with any relief.  While her x-rays do not demonstrate end-stage degenerative arthritis, the CT scan did demonstrate a significant subchondral cyst.  She is not a candidate for MRI due to the fact that she has a diaphragmatic stimulator.    Based on her physical examination, previous response to intra-articular injections and radiographs, I think she is an appropriate candidate for total hip replacement.    PLAN:  Lara Morris for more than six months has had limited function as well as persistent and severe pain which has negatively impacted the quality of life and interfered with activities of daily living. Under my care or the care of other providers, for greater than the three months, conservative treatment including activity modification, over the counter pain medications, physical therapy and/or recommended home exercise program, have provided only minimal relief. The patient has not had an intra-articular injection in the past 3 months. The option to continue with conservative measures in lieu of arthroplasty was discussed and offered. However, given the failure of these conservative measures and the clinical and radiographic evidence of end-stage arthritis, the patient is a good candidate for an elective total hip arthroplasty. The stated potential benefits include pain relief, a feeling of improved joint motion and improved function were discussed but no guarantees were offered.    Plan for right total hip arthroplasty.  CPT 26737.  [employer]  emphasis and Actis.  2 mobility backup.  Overnight admission.  [place] only given medical history.    We would have to time of the operation so that she receives IVIG just prior to the surgery.  We would also have to obtain clearance from her general surgeon.  She is planning to have the diaphragmatic stimulator removed in the summer.  We could potentially schedule her surgery thereafter.    *This note was created using voice recognition software and was not corrected for typographical or grammatical errors.*

## 2025-02-24 NOTE — PROGRESS NOTES
Patient: Lara Morris    [id number]  : 1960 -- AGE 64 y.o.    Provider: Melani Wong MD PhD     Location [facility]   Service Date: 2025              [facility]  Followup Visit Note      HISTORY OF PRESENT ILLNESS     The patient's referring provider is: Chan Hernandez MD     HISTORY OF PRESENT ILLNESS   Lara Morris is a 64 y.o. female with past medical history significant for JOSÉ MIGUEL on CPAP, CVID, aortic stenosis, T2DM, asthma, right hemidiaphragm paralysis s/p b/l pacer on 23, CFS, GERD, hypothyroid who presents to a [facility] for followup.     PAST SLEEP HISTORY  Patient seen by me on 23. Patient reported that she has known JOSÉ MIGUEL for 8 years and has been on CPAP. She was referred to establish care for her JOSÉ MIGUEL. She has benefitted from CPAP with nasal pillows on CPAP at 11cwp. She thinks she had severe JOSÉ MIGUEL. On CPAP she does not snore, gasp, choke. Without CPAP she will have her leg jerks. She sleeps at about 30 degrees. Her DME was [employer]. She gets supplies now from [employer]     She also reported RLS symptoms. She described them as a sense that she has to move her legs. If her legs are still there will spontaneously move on their own. mostly at night but now in the evenings. She massages her legs to help. If she walks they are removed she was on roprinole but had hives. Bother her every night and makes falling asleep difficult. She is currently on Neurontin at 300 mg for back pain that she takes at night just before going to bed. Symptoms start at 7:30-8pm.      Patient may have been exposed to RSV when her grandchildren were affected. She became sick but did not recover her breathing fully. Patient with right hemidiaphragm paralysis with SOB since 2023 (elevation present since at least ). Patient with pacer placed by Dr. Torres on 23.  With the diaphragm stimulator she has noted that she is using it all  the time. When she stops it or when she goes for her shower she does notice that she is more short of breath at that time. She will have to continue using this for the next 2 years is what she was informed.    A home sleep apnea test was done on 11/1/2023 at a BMI of 38.8.  This showed an FRANCESCA 3% of 17 events/hour and FRANCESCA 4% of 12 events/hour and a central apnea index of 0.1 events/hour.  There was no significant effect of body position.  She then had an in center PAP titration done on 11/21/2023 CPAP at 6 and 7 appeared to be effective as well as BPAP at 8/4.    Had referred her for iron infusions (Ferraheme) in January of 2024 given ferritin of 63 and iron saturation of 9%. She had 2 sessions. Per PCP notes felt more energy and had improvements in her RLS. Ferritin testing performed on 8/30/2024 which was at 250.  Her iron level was 66 with a percent saturation of 16% and TIBC of 409.    DME: Eugenio (device from 12/2023) - [employer] -     CURRENT HISTORY  At her last visit her RLS and sleep apnea are relatively unchanged.    On today's visit:    JOSÉ MIGUEL.  Using her BPAP nightly but feeling like  not enough pressure.  Sleeping with head of bed elevated. Using every night. No headache. She sleeps well with it. Current settings are 9/4.  She wants to be able to use a nasal pillow mask but was told that it would not work with BPAP.    RLS. Some symptoms of evening legs symptoms.  When she takes Neurontin 600mg  and Hylands for leg cramps this is alleviated. She tried oral iron but was nauseated with the formulation we had suggested.  She was not able to get her iron test completed yet.    Right hemidiaphragm paralysis. Saw Ms. Schwartz on 11/8/24. Some increased EMG activity noted. Now limited to night time pacing. Plan for 5 more months and then reassess.      Sleep schedule:    In bed: 10:30PM - 11PM.  Activities in bed:  Bedtime Activities: turns out the lights  Subjective sleep latency:  up to 60 minutes  Awakenings  during night: 1-2  Length of awakenings: 30-60 min  Final awakening time: 6AM  Out of bed: 6A  Overall estimate of total sleep time: 5-6    Weekends: wakes at 7AM  Preferred sleeping position: supine and sidelying  Typically sleeps with her .       PAP Adherence:  DURABLE MEDICAL EQUIPMENT COMPANY: [employer]  Machine: THERAPY: BILEVEL PAP PRESSURE SETTINGS: 8.5 cm H2O / 4 cm H2O  Mask: MASK TYPE: NASAL MASK   Issues with therapy: ISSUES WITH THERAPY: none  Benefits with PAP: PERCEIVED BENEFITS OF PAP: better sleep quality      Daytime Symptoms  On awakening patient reports: more refreshed than in the past.    Daytime: During the day, she does not doze unintentionally while inactive.  During more active tasks, she never is drowsy.  With regards to daytime napping, the patient reports never taking naps.  She does not report that poor sleep results in mood-related irritability. She does not report that poor sleep results in issues with memory/concentration.    ESS: 3  CAL: 10  FOSQ: -    REVIEW OF SYSTEMS     REVIEW OF SYSTEMS  Review of Systems   All other systems reviewed and are negative.      ALLERGIES AND MEDICATIONS     ALLERGIES  Allergies   Allergen Reactions    Adhesive Hives     All adhesives (including clonidine patches) caused blistering hives    Gamunex-C [Immune Globul G-Gly-Iga Avg 46] Anaphylaxis    Immune Globulin Hives and Shortness of breath    Immune Globulin,Gamma (Igg) Human Hives and Shortness of breath    Animal Dander Runny nose     Sneezing    Atorvastatin Myalgia    Codeine Nausea/vomiting    House Dust Runny nose     Sneezing    Ropinirole Hives    Tramadol Nausea/vomiting    Ciprofloxacin Hives and Rash    Erythromycin Hives and Rash       MEDICATIONS: She has a current medication list which includes the following prescription(s): acetaminophen - Take 2 tablets (650 mg) by mouth every 6 hours if needed for mild pain (1 - 3), acyclovir-hydrocortisone - Xerese 5-1 % External Cream  Quantity: 5 Refills: 0 DO Start : 22-Mar-2018 Active, albuterol - Inhale 2 puff by mouth every 4 hours as needed Inhalation, albuterol - Inhale 2 puffs every 4 hours as needed, autolet - Use as instructed to test blood sugar once daily, azelastine - USE 1-2 SPRAYS IN EACH NOSTRIL ONCE DAILY TO TWO TIMES A DAY AS NEEDED FOR NASAL DRAINAGE, accu-chek guide test strips - 1 each once daily, blood-glucose meter - 1 each once daily, calcium citrate - Take 1 tablet (200 mg) by mouth once daily, cetirizine - Take 1 tablet (10 mg) by mouth 2 times a day, cholecalciferol - Take 1 tablet (25 mcg) by mouth once daily, clobetasol - Apply 1 application twice daily as needed for rash, clobetasol - Apply 1 application 2 times daily for 2 weeks and then as needed for 2 weeks, cyanocobalamin - Inject 1 mL into the muscle every 3 weeks, cyanocobalamin - Inject 1 mL (1000 mcg) into the muscle every 3 weeks, eletriptan - Take 1 tablet at onset of headache. May repeat in 2 hours one time, epinastine - Instill 1 drop into affected eye twice daily as needed, epipen 2-gordo - Use intramuscularly as needed for allergic reaction, ezetimibe - Take 1 tablet (10 mg) by mouth once daily, ferrous fumarate-vitamin c - Take 1 tablet by mouth 3 times daily (morning, midday, late afternoon). Do not crush, chew, or split, fluticasone - Administer 2 sprays into each nostril once daily, trelegy ellipta - Inhale 1 puff by mouth daily, gabapentin - Take 1 capsule (100 mg) by mouth once daily, gabapentin - Take 2 capsules (600 mg) by mouth once daily at bedtime, ginseng - Take 100 mg by mouth once daily, ibuprofen - Take 1 tablet (800 mg) by mouth 2 times a day as needed for moderate pain (4 - 6), immune globulin (human) - Inject 120 mL (24 g) under the skin 1 (one) time per week for 24 doses, ipratropium-albuterol - Take 3 mL by nebulization 4 times a day as needed for shortness of breath or wheezing, lancets - Test blood sugar once daily, levothyroxine -  TAKE 1 TABLET BY MOUTH ONCE DAILY, lidocaine-prilocaine - Apply 1 application once a day as needed, nucala - Inject 100 mg (1 pen) under the skin every 4 weeks as directed, nucala - Inject 100 mg (1 pen) under the skin every 4 weeks as directed, montelukast - TAKE 1 TABLET BY MOUTH EVERY DAY, montelukast - TAKE 1 TABLET BY MOUTH EVERY DAY, multivitamin - Take 1 tablet by mouth once daily, omega 3-dha-epa-fish oil - Take 1 capsule (1,200 mg) by mouth once daily, omeprazole - Take 1 capsule (40 mg) by mouth once daily in the morning. Take before meals. Do not crush or chew, onabotulinumtoxina - PHYSICIAN TO INJECT UP  UNITS INTRAMUSCULARLY EVERY 90 DAYS. FOR OFFICE USE ONLY, pemgarda (eua) - Providers office to inject 4500 Milligram(s) intravenously every 12 weeks. For  only, magnesium, potassium aspartate - Take 2 capsules by mouth once daily, prednisone - Take 4 tabs by mouth daily for 2 days, then 3 tabs daily for 2 days, then 2 tabs daily for 2 days, then 1 tab daily for 2 days, resveratrol - Take 1,450 mg by mouth once daily, sulfamethoxazole-trimethoprim - Take 1 tablet(s) by mouth once daily, bd luer-ila syringe - Use as directed for B12 injections, monoject tuberculin syringe - use 2 syringe(s) once a month, mounjaro - Inject 12.5 mg under the skin 1 (one) time per week, turmeric - Take by mouth, esomeprazole - TAKE 1 CAPSULE BY MOUTH EVERY MORNING BEFORE MEALS, and rizatriptan - TAKE 1 TABLET BY MOUTH AT BEGINNING OF MIGRAINE. TAKE 1 BY MOUTH EVERY 2 HOURS IF MIGRAINE NOT RESOLVED. MAXIMUM OF 3 TABLET BY MOUTH IN 24 HOURS.    PAST MEDICAL HISTORY : She has Abnormal mammogram; Arthritis; Asthma; Sleep apnea; Astigmatism, bilateral; Bariatric surgery status; Bilateral presbyopia; Bilateral sensorineural hearing loss; Bilateral tinnitus; Chronic fatigue syndrome; Chronic sinusitis; Combined hyperlipidemia; Common variable immunodeficiency; Dermatochalasis of right upper eyelid;  Diabetes mellitus type 2 without retinopathy (Multi); GERD (gastroesophageal reflux disease); Hemifacial spasm of left side of face; Hypothyroidism due to Hashimoto's thyroiditis; Insomnia; Lump or mass in breast; Nuclear sclerosis of both eyes; Restless legs syndrome; Vitamin B12 deficiency; Vitamin D deficiency, unspecified; Class 2 severe obesity due to excess calories with serious comorbidity and body mass index (BMI) of 38.0 to 38.9 in adult; Aortic stenosis; Chronic respiratory failure; Diaphragm dysfunction; Generalized abdominal pain; Brow ptosis, bilateral; Dyspnea; Iron deficiency; Posterior tibial tendon dysfunction; Primary localized osteoarthrosis of ankle and foot; Pronation of foot; Steatoblepharon; Plantar fasciitis; Osteoarthritis of knee; Bronchiectasis, uncomplicated (Multi); Statin intolerance; RLS (restless legs syndrome); Dermatochalasis; Hypertension; Hyperthyroidism; Dyslipidemia; Clonic hemifacial spasm of muscle of left side of face; PONV (postoperative nausea and vomiting); Immunocompromised; Breast pain, left; and Nipple discharge on their problem list.    PAST SURGICAL HISTORY: She  has a past surgical history that includes Colonoscopy (11/04/2013); Tonsillectomy (11/03/2016); Thyroid surgery (11/03/2016); Gallbladder surgery (11/03/2016); Hysterectomy (11/03/2016); Total knee arthroplasty (06/07/2020); Hernia repair (12/13/2016); and Ventral hernia repair (09/17/2018).     FAMILY HISTORY: No changes since previous visit. Otherwise non-contributory as charted.     SOCIAL HISTORY  She  reports that she has quit smoking. Her smoking use included cigarettes. She has been exposed to tobacco smoke. She has never used smokeless tobacco. She reports that she does not drink alcohol and does not use drugs. She works in [employer] at . She does have 3-4 cups of coffee with one after dinner.      PHYSICAL EXAM     Physical Examination: /64 (BP Location: Right arm, Patient Position: Sitting,  "BP Cuff Size: Adult)   Pulse 73   Temp 36.4 °C (97.6 °F) (Temporal)   Ht 1.753 m (5' 9\")   Wt 112 kg (247 lb 9.6 oz)   LMP  (LMP Unknown)   BMI 36.56 kg/m²     PREVIOUS WEIGHTS:  Wt Readings from Last 3 Encounters:   02/26/25 112 kg (247 lb 9.6 oz)   01/31/25 109 kg (240 lb 6.4 oz)   12/16/24 106 kg (234 lb)     No exam today    RESULTS/DATA     Iron (ug/dL)   Date Value   08/30/2024 66   12/13/2023 44   08/09/2023 58     % Saturation (%)   Date Value   08/30/2024 16 (L)   12/13/2023 9 (L)     Iron Saturation (%)   Date Value   08/09/2023 13 (L)     TIBC (ug/dL)   Date Value   08/30/2024 409   12/13/2023 472 (H)   08/09/2023 459 (H)     Ferritin   Date Value   08/30/2024 250 ng/mL (H)   08/09/2023 63 ug/L       Bicarbonate (mmol/L)   Date Value   08/30/2024 27   04/10/2024 27   12/08/2023 31       PAP Adherence - Jazlyn Device                DIAGNOSES     Problem List and Orders  Diagnoses and all orders for this visit:  Disorder of iron metabolism  JOSÉ MIGUEL (obstructive sleep apnea)  -     Follow Up In Adult Sleep Medicine  -     Follow Up In Adult Sleep Medicine; Future  Chronic respiratory failure, unspecified whether with hypoxia or hypercapnia  RLS (restless legs syndrome)  -     Follow Up In Adult Sleep Medicine  Class 2 severe obesity due to excess calories with serious comorbidity and body mass index (BMI) of 39.0 to 39.9 in adult              ASSESSMENT/PLAN     Ms. Morris is a 64 y.o. female and with past medical history significant for JOSÉ MIGUEL on CPAP, CVID, aortic stenosis, T2DM, asthma, right hemidiaphragm paralysis s/p b/l pacer on 6/19/23, CFS, GERD, hypothyroid.  She returns in followup to  the [facility] for their RLS and sleep apnea.    JOSÉ MIGUEL.  She has mild JOSÉ MIGUEL and is continues on bilevel at 9/4.  Her DME is [facility].  She has a Jazlyn BPAP device she feels comfortable with this.  She will look into increasing it on her own up to 10/4 or even 11/4 and no more than that.  She " will also see if she get a nasal pillow mask from [facility].    RLS.  RLS symptoms continue to remain stable.  Her gabapentin is currently at 600 mg and she takes Hylands PM for leg cramps.  This seems to help control her symptoms.  She did not tolerate the oral iron supplement that we recommended.  She will look at trying oral tablet instead with vitamin C.  Encouraged her to get the iron testing done so we can see the trajectory of her iron decay.      Right hemidiaphragm paralysis.  She has a diaphragm pacing implant that was placed on 7/1/2023.  Current plan is for this to be removed potentially in about 5 months.  She is only using stimulation at night.    Obesity.  The patient was counseled that her weight is the strongest modifiable risk factor and contributor for JOSÉ MIGUEL. She was counseled to consider weight loss options to include changes in dietary habits and activity. Before initiating an exercise plan, she should carefully review the approach with her primary care provider.       Follow up: 6 months         Melani Wong MD PhD

## 2025-02-26 ENCOUNTER — APPOINTMENT (OUTPATIENT)
Dept: SLEEP MEDICINE | Facility: CLINIC | Age: 65
End: 2025-02-26
Payer: COMMERCIAL

## 2025-02-26 VITALS
WEIGHT: 247.6 LBS | HEIGHT: 69 IN | DIASTOLIC BLOOD PRESSURE: 64 MMHG | BODY MASS INDEX: 36.67 KG/M2 | HEART RATE: 73 BPM | TEMPERATURE: 97.6 F | SYSTOLIC BLOOD PRESSURE: 119 MMHG

## 2025-02-26 DIAGNOSIS — E66.01 CLASS 2 SEVERE OBESITY DUE TO EXCESS CALORIES WITH SERIOUS COMORBIDITY AND BODY MASS INDEX (BMI) OF 39.0 TO 39.9 IN ADULT: ICD-10-CM

## 2025-02-26 DIAGNOSIS — G25.81 RLS (RESTLESS LEGS SYNDROME): ICD-10-CM

## 2025-02-26 DIAGNOSIS — J96.10 CHRONIC RESPIRATORY FAILURE, UNSPECIFIED WHETHER WITH HYPOXIA OR HYPERCAPNIA: ICD-10-CM

## 2025-02-26 DIAGNOSIS — E83.10 DISORDER OF IRON METABOLISM: Primary | ICD-10-CM

## 2025-02-26 DIAGNOSIS — G47.33 OSA (OBSTRUCTIVE SLEEP APNEA): ICD-10-CM

## 2025-02-26 DIAGNOSIS — E66.812 CLASS 2 SEVERE OBESITY DUE TO EXCESS CALORIES WITH SERIOUS COMORBIDITY AND BODY MASS INDEX (BMI) OF 39.0 TO 39.9 IN ADULT: ICD-10-CM

## 2025-02-26 PROCEDURE — 1036F TOBACCO NON-USER: CPT | Performed by: INTERNAL MEDICINE

## 2025-02-26 PROCEDURE — 3074F SYST BP LT 130 MM HG: CPT | Performed by: INTERNAL MEDICINE

## 2025-02-26 PROCEDURE — 1159F MED LIST DOCD IN RCRD: CPT | Performed by: INTERNAL MEDICINE

## 2025-02-26 PROCEDURE — 3008F BODY MASS INDEX DOCD: CPT | Performed by: INTERNAL MEDICINE

## 2025-02-26 PROCEDURE — 3078F DIAST BP <80 MM HG: CPT | Performed by: INTERNAL MEDICINE

## 2025-02-26 PROCEDURE — 99214 OFFICE O/P EST MOD 30 MIN: CPT | Performed by: INTERNAL MEDICINE

## 2025-02-26 NOTE — PATIENT INSTRUCTIONS
[facility]  [address]  [facility]  [address]  [facility] [address]    [address]  [facility]  [address]  [facility] [address]  [facility]  [address]  [facility] [address]           NAME: Lara Morris   DATE: 2/26/2025     Your Sleep Provider Today: Melani Wong MD PhD  Your Primary Care Physician: Chan Hernandez MD   Your Referring Provider: Melani Wong MD PhD    Thank you for coming to the Sleep Medicine Clinic today! Your sleep medicine provider today was: Melani Wong MD PhD Below is a summary of your treatment plan, other important information, and our contact numbers:  If you need to schedule an appointment, please call [phone]  If you need general assistance (e.g. forms completed, general questions), please call my , Kayla, at [phone].  If you have a medical question about your sleep issues, please contact our nurses, Jo Ann or Jaylin at [phone].   You can also contact us through [employer]      DIAGNOSIS:   1. JOSÉ MIGUEL (obstructive sleep apnea)  Follow Up In Adult Sleep Medicine      2. RLS (restless legs syndrome)  Follow Up In Adult Sleep Medicine              TREATMENT PLAN     RLS - continue with gabapentin at 600mg and the Hylands; get iron studies when you can  JOSÉ MIGUEL - increase IPAP setting to 11 cwp        Follow-up Appointment:   Followup with me in 6 months.      IMPORTANT INFORMATION     Call 911 for medical emergencies.  Our offices are generally open from Monday-Friday, 9 am - 5 pm.  If you need to get in touch with me, you may either call me and my team(number is below) or you can use [employer]  If a referral for a test, for CPAP, or for another specialist was made, and you have not heard about scheduling this within a week, please call scheduling at [phone]).  If you are unable to make your appointment for clinic or an  overnight study, kindly call the office at least 48 hours in advance to cancel and reschedule.  If you are on CPAP, please bring your device's card or the device to each clinic appointment.   There are no supporting services by either the sleep doctors or their staff on weekends and Holidays, or after 5 PM on weekdays.   If you have been asked to come to a sleep study, make sure you bring toiletries, a comfy pillow, and any nighttime medications that you may regularly take. Also be sure to eat dinner before you arrive. We generally do not provide meals.      PRESCRIPTIONS     We require 7 days advanced notice for prescription refills. If we do not receive the request in this time, we cannot guarantee that your medication will be refilled in time.      IMPORTANT PHONE NUMBERS      scheduling for medical testin553 - [phone]   Sleep Medicine Clinic Fax: [phone]  Appointments (for Pediatric Sleep Clinic): [phone] - option 1  Appointments (for Adult Sleep Clinic): [phone] - option 2  Appointments (For Sleep Studies): [phone] - option 3  Financial Assistance Program: Call [phone] (For [facility] services: call [phone])  Website:  Financial Assistance  Behavioral Sleep Medicine: [phone]  Bariatric Surgery: [phone] ( Bariatric Surgery Website)   Sleep Surgery: [phone]  ENT (Otolaryngology): [phone]  Myofunctional Therapy (ENT): TRAY Mcnamara, Elizabeth Marquez, Rosas Crawley; [phone]   Rios Lopez; [phone]  Jonny Stafford; [phone]  [facility]; [phone]  [facility] [phone] (option 1)  Headache Clinic (Neurology): [phone]  Neurology: [phone]  Psychiatry: [phone]  Pulmonary Function Testing (PFT) Center: [phone]  Pulmonary Medicine: [phone]  [employer] ([employer]): [phone]  [employer] ([facility]): [phone]  [employer] (DME): [phone]      COMMON PROVIDERS WE REFER TO     For Weight Loss - Dr. Stephen Seth - Call [phone]  For Sleep Surgery - Dr. August Godinez - Call [phone]      OUR ADULT SLEEP MEDICINE TEAM   Please do not hesitate to call the office or sleep nurse with any questions between appointments:    Adult Sleep Nurses (Jaylin Wilson, RN and Sasha Puente, RN):  For clinical questions and refilling prescriptions: [phone]  Email sleep diaries and other documents at: [email or web address]    Adult Sleep Medicine Secretaries:  Lyn Freeman (For [place]/Yeh/[place]):   P: [phone]  F: [phone]  Kayla Gregg (For Wong/Elisa): P: [phone]  Fax: [phone]  Kailee Dubon (For Jurcevic/Blank): P: [phone]  F: [phone]  Kathrin Finley (For Elizabeth): P: [phone]  F: [phone]  Ashlee Mcarthur (For Karyna/Beau/[place]): P: [phone]  F: [phone]  Olga Brooke (For Travis/[place]): P: [phone]  F: [phone]     Adult Sleep Medicine Advanced Practice Providers:  Cm Hare ([place], [place])  Karlie Yanez ([facility])  Lesa Pérez CNP ([place], [place], [facility])  Rocío Henry CNP ([place], [place], [facility])  Maninder Butcher CNP ([facility])        OUR SLEEP TESTING LOCATIONS     Our team will contact you to schedule your sleep study, however, you can contact us as follow:  Main Phone Line (scheduling only): [phone]), option 3  Adult and Pediatric Locations  [facility] (6 years and older): [facility] - 4th floor ([address] [place] [address]) After hours line: [phone]  [facility] (Main campus: All ages): [place], 6th floor. After hours line: [phone]   [place] (5 years and older; younger considered on case-by-case basis): [address]; [address], [address]. Scheduling   "After hours line: [phone]   [place] (6 years and older): [address]; [address]; [address]   [place] (6 years and older): [address], [address]  After hours line: [phone]   [place] (13 years and older) in [place]: [address], 2nd floor  After hours line: [phone]   [place] (13 year and older): [address]  After hours line: [phone]     Adult Only Locations:   [place] (18 years and older): [address] [facility], 2nd floor   [place] (18 years and older): [address]; 4th floor  After hours line: [phone]   [place] (18 years and older) at [place]: [address] [place] [address]  After hours line: [phone]          CONTACTING YOUR SLEEP MEDICINE PROVIDER     Send a message directly to your provider through \"My Chart\", which is the email service through your  Records Account: [email or web address]   Call [phone] and leave a message. One of the administrative assistants will forward the message to your sleep medicine provider through \"My Chart\" and/or email.     Your sleep medicine provider for this visit was: Melani Wong MD PhD        "

## 2025-02-27 ENCOUNTER — PHARMACY VISIT (OUTPATIENT)
Dept: PHARMACY | Facility: CLINIC | Age: 65
End: 2025-02-27
Payer: COMMERCIAL

## 2025-02-27 ENCOUNTER — SPECIALTY PHARMACY (OUTPATIENT)
Dept: PHARMACY | Facility: CLINIC | Age: 65
End: 2025-02-27

## 2025-02-27 PROCEDURE — RXMED WILLOW AMBULATORY MEDICATION CHARGE

## 2025-03-01 LAB
ALBUMIN SERPL-MCNC: 4.1 G/DL (ref 3.6–5.1)
ALP SERPL-CCNC: 57 U/L (ref 37–153)
ALT SERPL-CCNC: 16 U/L (ref 6–29)
ANION GAP SERPL CALCULATED.4IONS-SCNC: 9 MMOL/L (CALC) (ref 7–17)
AST SERPL-CCNC: 17 U/L (ref 10–35)
BASOPHILS # BLD AUTO: 36 CELLS/UL (ref 0–200)
BASOPHILS NFR BLD AUTO: 0.4 %
BILIRUB SERPL-MCNC: 0.5 MG/DL (ref 0.2–1.2)
BUN SERPL-MCNC: 15 MG/DL (ref 7–25)
CALCIUM SERPL-MCNC: 8.8 MG/DL (ref 8.6–10.4)
CHLORIDE SERPL-SCNC: 99 MMOL/L (ref 98–110)
CO2 SERPL-SCNC: 25 MMOL/L (ref 20–32)
CREAT SERPL-MCNC: 0.76 MG/DL (ref 0.5–1.05)
EGFRCR SERPLBLD CKD-EPI 2021: 86 ML/MIN/1.73M2
EOSINOPHIL # BLD AUTO: 73 CELLS/UL (ref 15–500)
EOSINOPHIL NFR BLD AUTO: 0.8 %
ERYTHROCYTE [DISTWIDTH] IN BLOOD BY AUTOMATED COUNT: 13.4 % (ref 11–15)
ERYTHROCYTE [SEDIMENTATION RATE] IN BLOOD BY WESTERGREN METHOD: 36 MM/H
GLUCOSE SERPL-MCNC: 68 MG/DL (ref 65–99)
HCT VFR BLD AUTO: 38.1 % (ref 35–45)
HGB BLD-MCNC: 12.8 G/DL (ref 11.7–15.5)
IGA SERPL-MCNC: NORMAL MG/DL
IGG SERPL-MCNC: NORMAL MG/DL
IGM SERPL-MCNC: NORMAL MG/DL
LYMPHOCYTES # BLD AUTO: 2739 CELLS/UL (ref 850–3900)
LYMPHOCYTES NFR BLD AUTO: 30.1 %
MCH RBC QN AUTO: 30.3 PG (ref 27–33)
MCHC RBC AUTO-ENTMCNC: 33.6 G/DL (ref 32–36)
MCV RBC AUTO: 90.1 FL (ref 80–100)
MONOCYTES # BLD AUTO: 610 CELLS/UL (ref 200–950)
MONOCYTES NFR BLD AUTO: 6.7 %
NEUTROPHILS # BLD AUTO: 5642 CELLS/UL (ref 1500–7800)
NEUTROPHILS NFR BLD AUTO: 62 %
PLATELET # BLD AUTO: 283 THOUSAND/UL (ref 140–400)
PMV BLD REES-ECKER: 10.4 FL (ref 7.5–12.5)
POTASSIUM SERPL-SCNC: 4.1 MMOL/L (ref 3.5–5.3)
PROT SERPL-MCNC: 6.8 G/DL (ref 6.1–8.1)
RBC # BLD AUTO: 4.23 MILLION/UL (ref 3.8–5.1)
SODIUM SERPL-SCNC: 133 MMOL/L (ref 135–146)
WBC # BLD AUTO: 9.1 THOUSAND/UL (ref 3.8–10.8)

## 2025-03-02 LAB
FERRITIN SERPL-MCNC: 166 NG/ML (ref 16–288)
IRON SATN MFR SERPL: 12 % (CALC) (ref 16–45)
IRON SERPL-MCNC: 38 MCG/DL (ref 45–160)
TIBC SERPL-MCNC: 323 MCG/DL (CALC) (ref 250–450)

## 2025-03-03 ENCOUNTER — SPECIALTY PHARMACY (OUTPATIENT)
Dept: PHARMACY | Facility: CLINIC | Age: 65
End: 2025-03-03

## 2025-03-03 LAB
ALBUMIN SERPL-MCNC: 4.1 G/DL (ref 3.6–5.1)
ALP SERPL-CCNC: 57 U/L (ref 37–153)
ALT SERPL-CCNC: 16 U/L (ref 6–29)
ANION GAP SERPL CALCULATED.4IONS-SCNC: 9 MMOL/L (CALC) (ref 7–17)
AST SERPL-CCNC: 17 U/L (ref 10–35)
BASOPHILS # BLD AUTO: 36 CELLS/UL (ref 0–200)
BASOPHILS NFR BLD AUTO: 0.4 %
BILIRUB SERPL-MCNC: 0.5 MG/DL (ref 0.2–1.2)
BUN SERPL-MCNC: 15 MG/DL (ref 7–25)
CALCIUM SERPL-MCNC: 8.8 MG/DL (ref 8.6–10.4)
CHLORIDE SERPL-SCNC: 99 MMOL/L (ref 98–110)
CO2 SERPL-SCNC: 25 MMOL/L (ref 20–32)
CREAT SERPL-MCNC: 0.76 MG/DL (ref 0.5–1.05)
EGFRCR SERPLBLD CKD-EPI 2021: 86 ML/MIN/1.73M2
EOSINOPHIL # BLD AUTO: 73 CELLS/UL (ref 15–500)
EOSINOPHIL NFR BLD AUTO: 0.8 %
ERYTHROCYTE [DISTWIDTH] IN BLOOD BY AUTOMATED COUNT: 13.4 % (ref 11–15)
ERYTHROCYTE [SEDIMENTATION RATE] IN BLOOD BY WESTERGREN METHOD: 36 MM/H
GLUCOSE SERPL-MCNC: 68 MG/DL (ref 65–99)
HCT VFR BLD AUTO: 38.1 % (ref 35–45)
HGB BLD-MCNC: 12.8 G/DL (ref 11.7–15.5)
IGA SERPL-MCNC: 161 MG/DL (ref 70–320)
IGG SERPL-MCNC: 1369 MG/DL (ref 600–1540)
IGM SERPL-MCNC: 192 MG/DL (ref 50–300)
LYMPHOCYTES # BLD AUTO: 2739 CELLS/UL (ref 850–3900)
LYMPHOCYTES NFR BLD AUTO: 30.1 %
MCH RBC QN AUTO: 30.3 PG (ref 27–33)
MCHC RBC AUTO-ENTMCNC: 33.6 G/DL (ref 32–36)
MCV RBC AUTO: 90.1 FL (ref 80–100)
MONOCYTES # BLD AUTO: 610 CELLS/UL (ref 200–950)
MONOCYTES NFR BLD AUTO: 6.7 %
NEUTROPHILS # BLD AUTO: 5642 CELLS/UL (ref 1500–7800)
NEUTROPHILS NFR BLD AUTO: 62 %
PLATELET # BLD AUTO: 283 THOUSAND/UL (ref 140–400)
PMV BLD REES-ECKER: 10.4 FL (ref 7.5–12.5)
POTASSIUM SERPL-SCNC: 4.1 MMOL/L (ref 3.5–5.3)
PROT SERPL-MCNC: 6.8 G/DL (ref 6.1–8.1)
RBC # BLD AUTO: 4.23 MILLION/UL (ref 3.8–5.1)
SODIUM SERPL-SCNC: 133 MMOL/L (ref 135–146)
WBC # BLD AUTO: 9.1 THOUSAND/UL (ref 3.8–10.8)

## 2025-03-03 PROCEDURE — RXMED WILLOW AMBULATORY MEDICATION CHARGE

## 2025-03-06 ENCOUNTER — TELEPHONE (OUTPATIENT)
Dept: SLEEP MEDICINE | Facility: HOSPITAL | Age: 65
End: 2025-03-06
Payer: COMMERCIAL

## 2025-03-06 DIAGNOSIS — E83.10 DISORDER OF IRON METABOLISM: ICD-10-CM

## 2025-03-06 NOTE — TELEPHONE ENCOUNTER
Called patient and left VM regarding lab results.  Sleep nurse phone number [phone] - given to call back to discuss results and plan going forward.

## 2025-03-06 NOTE — TELEPHONE ENCOUNTER
----- Message from Melani Wong sent at 3/3/2025 11:46 AM EST -----  Looks like her iron is trending down. Can you remind her to take an iron tablet 1-2x/day with vitamin C at 250mg and we will repeat iron testing in 6 months?

## 2025-03-07 DIAGNOSIS — E11.9 DIABETES MELLITUS TYPE 2 WITHOUT RETINOPATHY (MULTI): ICD-10-CM

## 2025-03-08 PROCEDURE — RXMED WILLOW AMBULATORY MEDICATION CHARGE

## 2025-03-10 ENCOUNTER — HOME INFUSION (OUTPATIENT)
Dept: INFUSION THERAPY | Age: 65
End: 2025-03-10
Payer: COMMERCIAL

## 2025-03-10 ENCOUNTER — PHARMACY VISIT (OUTPATIENT)
Dept: PHARMACY | Facility: CLINIC | Age: 65
End: 2025-03-10
Payer: COMMERCIAL

## 2025-03-10 DIAGNOSIS — M16.11 PRIMARY OSTEOARTHRITIS OF RIGHT HIP: ICD-10-CM

## 2025-03-10 PROCEDURE — RXMED WILLOW AMBULATORY MEDICATION CHARGE

## 2025-03-10 RX ORDER — BLOOD SUGAR DIAGNOSTIC
1 STRIP MISCELLANEOUS DAILY
Qty: 100 EACH | Refills: 3 | Status: SHIPPED | OUTPATIENT
Start: 2025-03-10

## 2025-03-10 NOTE — PROGRESS NOTES
Review of chart - pt with order for Hizentra 24gm weekly. Order valid thru 10/29/25  This is fill 5 of 6  Auth good thru 7/2025 March FOTM verified.   Pt has been set up with copay assistance program.     Pt contacted, requesting to  medication 3/12     Pharmacy to dispense the following 3/11 for  3/12:  8x Hizentra 10gm/50mL vials  4x Hizentra 4gm/20mL vials  DOS 3/19 - 4/15     Next delivery due 4/16     FU 4/9 to check needs with patient

## 2025-03-10 NOTE — TELEPHONE ENCOUNTER
Patient returned call and updated her that the Iron lab work is trending down from her previous testing.  Discussed need to continue her oral Iron Supplementation 1-2 times daily with a OTC vitamin C  250 mg tablet. Patient stated she is currently taking the Iron Supplement 2x daily and will continue. Patient agreeable to repeating Lab work in 6 months prior to her follow-up appointment with Dr. Wong on 8/27/2025.

## 2025-03-11 ENCOUNTER — PHARMACY VISIT (OUTPATIENT)
Dept: PHARMACY | Facility: CLINIC | Age: 65
End: 2025-03-11
Payer: COMMERCIAL

## 2025-03-12 ENCOUNTER — SPECIALTY PHARMACY (OUTPATIENT)
Dept: PHARMACY | Facility: CLINIC | Age: 65
End: 2025-03-12

## 2025-03-12 ENCOUNTER — PHARMACY VISIT (OUTPATIENT)
Dept: PHARMACY | Facility: CLINIC | Age: 65
End: 2025-03-12
Payer: COMMERCIAL

## 2025-03-14 ENCOUNTER — PHARMACY VISIT (OUTPATIENT)
Dept: PHARMACY | Facility: CLINIC | Age: 65
End: 2025-03-14
Payer: COMMERCIAL

## 2025-03-21 ENCOUNTER — APPOINTMENT (OUTPATIENT)
Dept: PRIMARY CARE | Facility: CLINIC | Age: 65
End: 2025-03-21
Payer: COMMERCIAL

## 2025-03-24 ENCOUNTER — APPOINTMENT (OUTPATIENT)
Dept: NEUROLOGY | Facility: CLINIC | Age: 65
End: 2025-03-24
Payer: COMMERCIAL

## 2025-03-27 ENCOUNTER — APPOINTMENT (OUTPATIENT)
Dept: PRIMARY CARE | Facility: CLINIC | Age: 65
End: 2025-03-27
Payer: COMMERCIAL

## 2025-03-27 VITALS
WEIGHT: 236.7 LBS | TEMPERATURE: 97.3 F | HEART RATE: 70 BPM | HEIGHT: 68 IN | BODY MASS INDEX: 35.87 KG/M2 | DIASTOLIC BLOOD PRESSURE: 72 MMHG | OXYGEN SATURATION: 98 % | SYSTOLIC BLOOD PRESSURE: 111 MMHG

## 2025-03-27 DIAGNOSIS — E66.812 CLASS 2 SEVERE OBESITY DUE TO EXCESS CALORIES WITH SERIOUS COMORBIDITY AND BODY MASS INDEX (BMI) OF 38.0 TO 38.9 IN ADULT: ICD-10-CM

## 2025-03-27 DIAGNOSIS — D83.9 COMMON VARIABLE IMMUNODEFICIENCY, UNSPECIFIED: ICD-10-CM

## 2025-03-27 DIAGNOSIS — Z12.31 ENCOUNTER FOR SCREENING MAMMOGRAM FOR BREAST CANCER: Primary | ICD-10-CM

## 2025-03-27 DIAGNOSIS — J47.9 BRONCHIECTASIS, UNCOMPLICATED (MULTI): ICD-10-CM

## 2025-03-27 DIAGNOSIS — E06.3 HYPOTHYROIDISM DUE TO HASHIMOTO'S THYROIDITIS: ICD-10-CM

## 2025-03-27 DIAGNOSIS — E78.2 COMBINED HYPERLIPIDEMIA: ICD-10-CM

## 2025-03-27 DIAGNOSIS — E11.9 DIABETES MELLITUS TYPE 2 WITHOUT RETINOPATHY (MULTI): ICD-10-CM

## 2025-03-27 DIAGNOSIS — E66.01 CLASS 2 SEVERE OBESITY DUE TO EXCESS CALORIES WITH SERIOUS COMORBIDITY AND BODY MASS INDEX (BMI) OF 38.0 TO 38.9 IN ADULT: ICD-10-CM

## 2025-03-27 PROCEDURE — 1159F MED LIST DOCD IN RCRD: CPT | Performed by: INTERNAL MEDICINE

## 2025-03-27 PROCEDURE — 3074F SYST BP LT 130 MM HG: CPT | Performed by: INTERNAL MEDICINE

## 2025-03-27 PROCEDURE — 1036F TOBACCO NON-USER: CPT | Performed by: INTERNAL MEDICINE

## 2025-03-27 PROCEDURE — RXMED WILLOW AMBULATORY MEDICATION CHARGE

## 2025-03-27 PROCEDURE — 3078F DIAST BP <80 MM HG: CPT | Performed by: INTERNAL MEDICINE

## 2025-03-27 PROCEDURE — 3008F BODY MASS INDEX DOCD: CPT | Performed by: INTERNAL MEDICINE

## 2025-03-27 PROCEDURE — 99214 OFFICE O/P EST MOD 30 MIN: CPT | Performed by: INTERNAL MEDICINE

## 2025-03-27 RX ORDER — TIRZEPATIDE 15 MG/.5ML
15 INJECTION, SOLUTION SUBCUTANEOUS WEEKLY
Qty: 2 ML | Refills: 11 | Status: SHIPPED | OUTPATIENT
Start: 2025-03-27

## 2025-03-27 ASSESSMENT — ENCOUNTER SYMPTOMS
DEPRESSION: 0
LOSS OF SENSATION IN FEET: 0
OCCASIONAL FEELINGS OF UNSTEADINESS: 0

## 2025-03-27 NOTE — PROGRESS NOTES
"Subjective   Lara Morris is a 64 y.o. female who presents for Follow-up.  HPI    Past medical history includes CVID, obesity, hypothyroidism, asthma, hypertension, osteoarthritis and diabetes.    Interim:  - continues weekly IVIG infusions and Botox injections  - Rheum with joint injections  - ortho, WCL, recommending R SHERRY  - regular sleep follow-up, noted low iron without anemia    Initial A1c > 9%, now most recently 5.3% (4/2024), off Metformin and has not needed to use SSI (prescribed for when patient needs short steroid courses). Has not needed steroids since 1/2024.   On Mounjaro since May 2023, currently at 12.5mg.  Weight has hit a plateau.  Has really helped with controlling hunger.  She doesn't eat during the day but eats dinner when comes home at end of day. Typical meal is a protein with vegetables with a cottage cheese and fruit snack. Able to limit carbs more readily on the injectable.     She rides her ebike for activity. Is busy out boating, biking, and using her RV in the summer.      Providers:  Immunology-Dr. Emilie Richey  Surgery - Dr. Torres  Rheumatology-Dr. Moreno- Dr. Tiffany Love (no longer with )   Pulmonary: Dr. Gorge Wilkinson  Ophthalmology: Dr. Hanson  Neurology: Dr. Richie Tristan  Sleep: Dr. Wong    Objective   /72 (BP Location: Left arm, Patient Position: Sitting, BP Cuff Size: Adult)   Pulse 70   Temp 36.3 °C (97.3 °F) (Temporal)   Ht 1.727 m (5' 8\")   Wt 107 kg (236 lb 11.2 oz)   LMP  (LMP Unknown)   SpO2 98%   BMI 35.99 kg/m²    Gen: NAD, pleasant, A&;Ox3  HEENT: PERRL, EOMI, MMM, OP clear, TM clear, nasal congestion  Neck: supple, no thyromegaly, no JVD, normal carotid upstroke  Pulm: lungs CTAB, mild basilar inspiratory wheezes especially at RLL  CV: RRR, early systolic murmur, midsternal, prominent P2, 2+ DP pulses  Abd: NABS, soft, NT, ND no HSM; some bruising, pacer in place  Ext: no peripheral edema  Neuro: CN II-XII intact, no focal sensory " or motor deficits, normal reflexes    Assessment/Plan   No diagnosis found.         Metabolic syndrome: NIDDM/HTN/HLD/Obesity, A1c 5.3%  - Metformin discontinued since last visit, continue on GLP-1 RA monotherapy  -Increase Mounjaro to 15 mg weekly (initial weight ~300lb (5/2023)   -Sliding-scale insulin for glucose control with use of steroids if needed, has not needed  -Statin not tolerated even at very low dose (rosuva 5mg QOD); continue Zetia, recheck  -Blood pressure is controlled off lisinopril, even small dose causing symptoms of hypotension  -Recommend annual podiatry and eye exam    Hypothyroidism:   -Continues on levothyroxine, 150mcg daily  -Normal TSH 4/10/24    ---Copied forward for reference---    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 Torres/team  - CTPE negative  - PFTs normal May 2023  - mild aortic stenosis and diastolic dysfunction, June 2023; repeat TTE in one year    CVID and asthma: managed by Dr. Emilie Richey, on Hizentra    JOSÉ MIGUEL/RLS: Moderate with hypoxia on HSAT 11/1/2023  - on BiPAP, 8.5/4, tolerating better than CPAP, using every night and benefiting with energy and sleep quality, continue;   - iron supplementation and gabapentin 600mg at bedtime with improvement  - follow-up with sleep med, Dr. Wong         Health maintenance  -Mammogram: 3/20/2024, normal  -Pap: History of hysterectomy, unclear if indicated  -DEXA: If not done should check baseline given steroid use  -Last colonoscopy: 10/2/2024, tubular adenoma x 4; Repeat: 1 year(poor prep)  -Smoking history: Never  -Counseled regarding diet and exercise  -Immunizations: Per Dr. Emilie Richey  -Followup in 3-6 months, sooner if needed         Chan Hernandez MD

## 2025-03-28 LAB
CHOLEST SERPL-MCNC: 226 MG/DL
CHOLEST/HDLC SERPL: 5.9 (CALC)
EST. AVERAGE GLUCOSE BLD GHB EST-MCNC: 105 MG/DL
EST. AVERAGE GLUCOSE BLD GHB EST-SCNC: 5.8 MMOL/L
HBA1C MFR BLD: 5.3 % OF TOTAL HGB
HDLC SERPL-MCNC: 38 MG/DL
LDLC SERPL CALC-MCNC: 154 MG/DL (CALC)
NONHDLC SERPL-MCNC: 188 MG/DL (CALC)
TRIGL SERPL-MCNC: 195 MG/DL
TSH SERPL-ACNC: 0.94 MIU/L (ref 0.4–4.5)

## 2025-04-01 PROCEDURE — RXMED WILLOW AMBULATORY MEDICATION CHARGE

## 2025-04-05 ENCOUNTER — PHARMACY VISIT (OUTPATIENT)
Dept: PHARMACY | Facility: CLINIC | Age: 65
End: 2025-04-05
Payer: COMMERCIAL

## 2025-04-08 ENCOUNTER — HOME INFUSION (OUTPATIENT)
Dept: INFUSION THERAPY | Age: 65
End: 2025-04-08

## 2025-04-08 ENCOUNTER — PROCEDURE VISIT (OUTPATIENT)
Dept: NEUROLOGY | Facility: CLINIC | Age: 65
End: 2025-04-08
Payer: COMMERCIAL

## 2025-04-08 DIAGNOSIS — G51.32 CLONIC HEMIFACIAL SPASM OF MUSCLE OF LEFT SIDE OF FACE: Primary | ICD-10-CM

## 2025-04-08 PROCEDURE — RXMED WILLOW AMBULATORY MEDICATION CHARGE

## 2025-04-08 NOTE — PROGRESS NOTES
Neurology Botulinum Injection    Subjective   Lara Morris is an 64 y.o. female here for medical botulinum injection for No primary diagnosis found..      Injections worked well last time. Partially worn off. Mild L lower facial weakness once, but it did not happen last 2 times. She is 1 month overdue but spasms really only worsened in the past 3 weeks or so.     PMHx   CVID , taking IVIG for 12 years   Asthma  osteoarthritis   B12 deficiency   bilateral hearing impairment   wears CPAP   diaphragmatic pacemaker placed     PSHx  Bilateral knee replacement      Objective   Neurological Exam  Physical Exam  Procedures  Prior to the start of the procedure a time out was taken and the following were verified: the identity of the patient using two patient identifiers (name, ) - this was verified by the patient      The area was prepped in the usual sterile fashion. Botulinum toxin type A (Botox)was injected as follows:    Dose       Sites Muscle  5 units     1      left superomedial orbicularis ocularis   5 units     1      left superolateral orbicularis ocularis   5 units     1      left lateral orbicularis ocularis   5 units     1      left Inferior lateral orbicularis ocularis   5 units     1      left Inferior orbicularis ocularis   3 units     1      left zygomaticus   1 unit      1      left risorius  1 unit      1      left upper orbicularis oris  10 Unit    1      left platysma      Patient supplied Botox.  Total botulinum toxin used was 40 units, 60 discarded, patient supplied 100 units     Assessment/Plan hemifacial spasm

## 2025-04-08 NOTE — PROGRESS NOTES
PATIENT CONTINUES TO RECEIVE HIZENTRY 24GM WEEKLY.  ORDERS VALID THRU 10/29/25.  FOTM ACTIVE AND VERIFIED.  THIS WILL BE FILL 6/6 SO WE LEIGHANN NEED NEW RX WITH NEXT DELIVERY.  PATIENT SAID SHE SEES MD THIS WEEK AND KNOWS TO ASK FOR NEW RX.  SHE WILL P/U RX ON 4/16.  PROCESSED FILL FOR   8 HIZENTRA 10GM VIALS  4 HIZENTRA 4GM VIALS  DOS 4/16 THRU 5/13.  RPH TO F/U 5/7 TO CONFIRM WE HAVE NEW RX AND CHECK WITH PATIENT ON WHEN SHE WILL P/U. DSL

## 2025-04-10 ENCOUNTER — PHARMACY VISIT (OUTPATIENT)
Dept: PHARMACY | Facility: CLINIC | Age: 65
End: 2025-04-10
Payer: COMMERCIAL

## 2025-04-11 ENCOUNTER — SPECIALTY PHARMACY (OUTPATIENT)
Dept: PHARMACY | Facility: CLINIC | Age: 65
End: 2025-04-11

## 2025-04-11 PROCEDURE — RXMED WILLOW AMBULATORY MEDICATION CHARGE

## 2025-04-14 ENCOUNTER — DOCUMENTATION (OUTPATIENT)
Dept: INFUSION THERAPY | Age: 65
End: 2025-04-14
Payer: COMMERCIAL

## 2025-04-14 ENCOUNTER — PHARMACY VISIT (OUTPATIENT)
Dept: PHARMACY | Facility: CLINIC | Age: 65
End: 2025-04-14
Payer: COMMERCIAL

## 2025-04-14 NOTE — PROGRESS NOTES
Conversed with patient last week. She is planning to  her monthly doses on Wednesday 4/16 by 11 am from pharmacy department.     Sending drug, standard supplies for 4 dose of Hizentra delivered subcutaneously  via [employer] . Also sending addition tegaderm per patient request, as she stated needles leak otherwise. Sending mostly 2x2 anthony but also sending some 4x4 tegaderm for pt to try.     Patient had no questions for pharmacist

## 2025-04-15 ENCOUNTER — OFFICE VISIT (OUTPATIENT)
Dept: SURGERY | Facility: CLINIC | Age: 65
End: 2025-04-15
Payer: COMMERCIAL

## 2025-04-15 VITALS
BODY MASS INDEX: 36.37 KG/M2 | WEIGHT: 240 LBS | SYSTOLIC BLOOD PRESSURE: 122 MMHG | DIASTOLIC BLOOD PRESSURE: 62 MMHG | HEART RATE: 73 BPM | TEMPERATURE: 98.1 F | HEIGHT: 68 IN

## 2025-04-15 DIAGNOSIS — J98.6 DIAPHRAGM DYSFUNCTION: Primary | ICD-10-CM

## 2025-04-15 DIAGNOSIS — K43.9 VENTRAL HERNIA WITHOUT OBSTRUCTION OR GANGRENE: ICD-10-CM

## 2025-04-15 PROCEDURE — 3074F SYST BP LT 130 MM HG: CPT | Performed by: SURGERY

## 2025-04-15 PROCEDURE — 3008F BODY MASS INDEX DOCD: CPT | Performed by: SURGERY

## 2025-04-15 PROCEDURE — 1036F TOBACCO NON-USER: CPT | Performed by: SURGERY

## 2025-04-15 PROCEDURE — 99213 OFFICE O/P EST LOW 20 MIN: CPT | Performed by: SURGERY

## 2025-04-15 PROCEDURE — 1159F MED LIST DOCD IN RCRD: CPT | Performed by: SURGERY

## 2025-04-15 PROCEDURE — 1126F AMNT PAIN NOTED NONE PRSNT: CPT | Performed by: SURGERY

## 2025-04-15 PROCEDURE — 3078F DIAST BP <80 MM HG: CPT | Performed by: SURGERY

## 2025-04-15 PROCEDURE — 1160F RVW MEDS BY RX/DR IN RCRD: CPT | Performed by: SURGERY

## 2025-04-15 ASSESSMENT — PAIN SCALES - GENERAL: PAINLEVEL_OUTOF10: 0-NO PAIN

## 2025-04-15 NOTE — PROGRESS NOTES
"History Of Present Illness  Lara Morris is a 65 y.o. female presenting with recurrent incisional hernia along with diaphragm dysfunction.  The patient abdomen quite well.  Many years ago I did a laparoscopic incisional hernia repairs on her.  She had a previous open cholecystectomy.  She then had unilateral diaphragm dysfunction.  She has been doing diaphragm pacing for 18 months.  She has had some improvement.  Both by EMG and her symptoms.  She is also been successfully losing some weight.  She is presently is about this supraumbilical hernia.  This appears to be away from her by previous hernia operations were.  He is planning an hip replacement surgery later in the year.  She still presently working.        Last Recorded Vitals  Blood pressure 122/62, pulse 73, temperature 36.7 °C (98.1 °F), height 1.727 m (5' 8\"), weight 109 kg (240 lb).  Physical Examination  On examination she does have a hernia supraumbilically that is slightly tender.  Nonreducible.  She has all of her previous scars.    Relevant Results  Her chest x-ray did show some improvement.  I also reviewed with her her previous operative videos.  We did have some movement of the right diaphragm.    Assessment/Plan patient with a incisional hernia.  She also would like to have her electrodes eventually removed.  We can do a simultaneous laparoscopic removal of her electrodes along with repairing this hernia.  On discussion with her I do not think I would recommend a diaphragm plication since she does have some diaphragm movement.  She agrees with this plan.  She would like this operation in August.  She will come and see us at the main campus for fluoroscopy and diaphragm EMG.  We would then have her stop pacing for 4 to 8 weeks before we decide to remove her electrodes and fix her hernia.  She will contact our office to schedule this.    Kei Torres MD FACS  Professor of Surgery  Ezequiel Lemus Chair in Surgical " [place]  [facility] [employer]  [id number] [place] [address], [id number]  Phone [phone]  email: [email or web address]

## 2025-04-17 DIAGNOSIS — B99.9 RECURRENT INFECTIONS: Primary | ICD-10-CM

## 2025-04-17 PROCEDURE — RXMED WILLOW AMBULATORY MEDICATION CHARGE

## 2025-04-21 ENCOUNTER — PHARMACY VISIT (OUTPATIENT)
Dept: PHARMACY | Facility: CLINIC | Age: 65
End: 2025-04-21
Payer: COMMERCIAL

## 2025-04-24 ENCOUNTER — SPECIALTY PHARMACY (OUTPATIENT)
Dept: PHARMACY | Facility: CLINIC | Age: 65
End: 2025-04-24

## 2025-04-24 PROCEDURE — RXMED WILLOW AMBULATORY MEDICATION CHARGE

## 2025-05-05 ENCOUNTER — PHARMACY VISIT (OUTPATIENT)
Dept: PHARMACY | Facility: CLINIC | Age: 65
End: 2025-05-05
Payer: COMMERCIAL

## 2025-05-05 PROCEDURE — RXMED WILLOW AMBULATORY MEDICATION CHARGE

## 2025-05-08 DIAGNOSIS — G25.81 RLS (RESTLESS LEGS SYNDROME): ICD-10-CM

## 2025-05-08 PROCEDURE — RXMED WILLOW AMBULATORY MEDICATION CHARGE

## 2025-05-08 RX ORDER — GABAPENTIN 300 MG/1
600 CAPSULE ORAL NIGHTLY
Qty: 180 CAPSULE | Refills: 0 | Status: SHIPPED | OUTPATIENT
Start: 2025-05-08 | End: 2025-08-07

## 2025-05-09 ENCOUNTER — PHARMACY VISIT (OUTPATIENT)
Dept: PHARMACY | Facility: CLINIC | Age: 65
End: 2025-05-09
Payer: COMMERCIAL

## 2025-05-12 ENCOUNTER — SPECIALTY PHARMACY (OUTPATIENT)
Dept: PHARMACY | Facility: CLINIC | Age: 65
End: 2025-05-12

## 2025-05-12 PROCEDURE — RXMED WILLOW AMBULATORY MEDICATION CHARGE

## 2025-05-16 ENCOUNTER — PHARMACY VISIT (OUTPATIENT)
Dept: PHARMACY | Facility: CLINIC | Age: 65
End: 2025-05-16
Payer: COMMERCIAL

## 2025-05-16 ENCOUNTER — APPOINTMENT (OUTPATIENT)
Dept: ORTHOPEDIC SURGERY | Facility: HOSPITAL | Age: 65
End: 2025-05-16
Payer: COMMERCIAL

## 2025-05-16 ENCOUNTER — HOSPITAL ENCOUNTER (OUTPATIENT)
Dept: RADIOLOGY | Facility: HOSPITAL | Age: 65
Discharge: HOME | End: 2025-05-16
Payer: COMMERCIAL

## 2025-05-16 DIAGNOSIS — M25.561 RIGHT KNEE PAIN, UNSPECIFIED CHRONICITY: ICD-10-CM

## 2025-05-16 PROCEDURE — 99213 OFFICE O/P EST LOW 20 MIN: CPT | Performed by: PHYSICIAN ASSISTANT

## 2025-05-16 PROCEDURE — 73562 X-RAY EXAM OF KNEE 3: CPT | Mod: RT

## 2025-05-19 ENCOUNTER — APPOINTMENT (OUTPATIENT)
Dept: NEUROLOGY | Facility: CLINIC | Age: 65
End: 2025-05-19
Payer: COMMERCIAL

## 2025-05-20 ENCOUNTER — HOME INFUSION (OUTPATIENT)
Dept: INFUSION THERAPY | Age: 65
End: 2025-05-20
Payer: COMMERCIAL

## 2025-05-20 NOTE — PROGRESS NOTES
Review of chart. New orders received for patient to continue weekly Hizentra 24gm infusions for 1 year. Patient will  delivery on weds 5/21/25 this week. She requests emla, extra tegaderm and 2 needles per site with this delivery.    Processed fill for the following for patient  5/21/25:  4 x hizentra 4gm vials  8 x hizentra 10 gm vials  1 x emla cream tube    DOS 5/21 thru 6/17/25.    Follow up the week of 6/9 to check when patient will  next delivery.

## 2025-05-20 NOTE — PROGRESS NOTES
HPI  This is a pleasant 65 y.o. female here today for right knee pain/swelling.  She had rt TKA done several years ago by Dr. Quintero. Overall she had done well.  She is here today with concerns of swelling in the knee.  No specific injury.  She is a current patient of Dr. Coto.  She tried to schedule with him but was told he does not treat knees - this information was relayed to Dr Coto.  She is scheduled for a right SHERRY in October.  She has concerns with the swelling and if there could be an issue with her implant.         Medical History[1]    Surgical History[2]    Social History[3]        ROS  Reviewed and all pertinent positives mentioned in HPI.      EXAM  Patient is in no acute distress.  They are alert and oriented x3.  They are of normal mood and affect.  They are in no acute distress.  The patient's limb is warm and well-perfused.  They have intact sensation to light touch in all lower extremity dermatomes.  There is a moderate effusion.   Soft tissue swelling also noted in the RLE.          IMAGING  Imaging reviewed today reveal no gross fracture or dislocation.  S/p rt TKA. No evidence of hardware failure.         ASSESSMENT/PLAN  Patient with right knee swelling with prior TKA.  No evidence of hardware failure.  Information relayed to Dr. Chong Harris who will have him team reach out to her.    In regards to the soft tissue swelling, I recommended she follow up with her PCP.           Tahira Carter PA-C         [1]   Past Medical History:  Diagnosis Date    Asthma     Diaphragmatic stimulation by cardiac pacemaker     Hyperlipidemia     Hypothyroid     Incisional hernia without obstruction or gangrene 01/22/2019    Incisional hernia, without obstruction or gangrene    Noninfective gastroenteritis and colitis, unspecified 04/22/2019    Ileitis    Personal history of other diseases of the circulatory system 01/06/2021    History of hypertension    Personal history of other diseases of the  digestive system 06/07/2018    History of umbilical hernia    Personal history of other diseases of the nervous system and sense organs     History of peripheral neuropathy    Thyrotoxicosis, unspecified without thyrotoxic crisis or storm 01/25/2014    Hyperthyroidism   [2]   Past Surgical History:  Procedure Laterality Date    COLONOSCOPY  11/04/2013    Complete Colonoscopy    GALLBLADDER SURGERY  11/03/2016    Gallbladder Surgery    HERNIA REPAIR  12/13/2016    Inguinal Hernia Repair    HYSTERECTOMY  11/03/2016    Hysterectomy    THYROID SURGERY  11/03/2016    Thyroid Surgery    TONSILLECTOMY  11/03/2016    Tonsillectomy With Adenoidectomy    TOTAL KNEE ARTHROPLASTY  06/07/2020    Total Knee Arthroplasty    VENTRAL HERNIA REPAIR  09/17/2018    Ventral Hernia Repair   [3]   Social History  Tobacco Use    Smoking status: Former     Types: Cigarettes     Passive exposure: Past    Smokeless tobacco: Never   Vaping Use    Vaping status: Never Used   Substance Use Topics    Alcohol use: Never    Drug use: Never

## 2025-05-21 ENCOUNTER — DOCUMENTATION (OUTPATIENT)
Dept: INFUSION THERAPY | Age: 65
End: 2025-05-21
Payer: COMMERCIAL

## 2025-05-21 NOTE — PROGRESS NOTES
"Spoke with patient. Patient will  from warehouse today after 11 am per usual arrangements.     Sending drug, standard supplies including 9 mm needle sets and x2  4\"x4\" Tegaderm per needle site (32 total), syringes, tubing, admix pins and alcohol pads  for 4 doses of Hizentra delivered subcutaneously  via [employer] 60 Pump  + Emla cream.     Patient previously corresponded with a pharmacist.         "

## 2025-05-23 ENCOUNTER — APPOINTMENT (OUTPATIENT)
Dept: RHEUMATOLOGY | Facility: CLINIC | Age: 65
End: 2025-05-23
Payer: COMMERCIAL

## 2025-05-23 ENCOUNTER — SPECIALTY PHARMACY (OUTPATIENT)
Dept: PHARMACY | Facility: CLINIC | Age: 65
End: 2025-05-23

## 2025-05-23 VITALS
TEMPERATURE: 97.3 F | WEIGHT: 235.8 LBS | DIASTOLIC BLOOD PRESSURE: 66 MMHG | SYSTOLIC BLOOD PRESSURE: 109 MMHG | OXYGEN SATURATION: 94 % | HEIGHT: 68 IN | BODY MASS INDEX: 35.74 KG/M2 | HEART RATE: 63 BPM

## 2025-05-23 DIAGNOSIS — M19.90 ARTHRITIS: Primary | ICD-10-CM

## 2025-05-23 PROCEDURE — 1125F AMNT PAIN NOTED PAIN PRSNT: CPT | Performed by: INTERNAL MEDICINE

## 2025-05-23 PROCEDURE — RXMED WILLOW AMBULATORY MEDICATION CHARGE

## 2025-05-23 PROCEDURE — 3078F DIAST BP <80 MM HG: CPT | Performed by: INTERNAL MEDICINE

## 2025-05-23 PROCEDURE — 3008F BODY MASS INDEX DOCD: CPT | Performed by: INTERNAL MEDICINE

## 2025-05-23 PROCEDURE — 99214 OFFICE O/P EST MOD 30 MIN: CPT | Performed by: INTERNAL MEDICINE

## 2025-05-23 PROCEDURE — 1159F MED LIST DOCD IN RCRD: CPT | Performed by: INTERNAL MEDICINE

## 2025-05-23 PROCEDURE — 3074F SYST BP LT 130 MM HG: CPT | Performed by: INTERNAL MEDICINE

## 2025-05-23 RX ORDER — DULOXETIN HYDROCHLORIDE 20 MG/1
20 CAPSULE, DELAYED RELEASE ORAL DAILY
Qty: 30 CAPSULE | Refills: 11 | Status: SHIPPED | OUTPATIENT
Start: 2025-05-23 | End: 2026-05-23

## 2025-05-23 ASSESSMENT — PAIN SCALES - GENERAL: PAINLEVEL_OUTOF10: 6

## 2025-05-25 NOTE — PROGRESS NOTES
She complains of pain in the left hip and left shoulder.  She is planning to have left hip replacement surgery after she has the diaphragmatic pacer wires surgically removed.  She has been evaluated and noted to have improvement in the proximal diaphragm.  There is no unusual shortness of breath.  She also notes some discomfort in the left hip that extends down the lateral aspect of the left knee.  She has some discomfort in the third digit of her right hand.    There is tenderness in the left supraspinatus muscle region and mild pain on abduction of the left shoulder.  There is tenderness over the lateral aspect of the left hip.  There are no joint effusions.    X-ray right knee (5/16/2023) Osseous fragmentation at the lateral margin of the patella.  Small joint effusion.  X-ray right hip (2/18/2025) mild to moderate right and mild left hip osteoarthrosis with joint space narrowing and osteophytes.    Laboratory (3/27/2025) TSH 0.99, hemoglobin A1c 5.3%.    Bilateral diaphragmatic paralysis, right greater than left apparently improved, obstructive sleep apnea, hypothyroidism, cervical and lumbar spondylosis, osteoarthritis of the hips, right more than left, left greater trochanteric bursitis, aortic stenosis.    She was given a local injection of triamcinolone 20 mg with 1 mL of 1% lidocaine to the left greater trochanteric bursa.  She was referred to orthopedic hand for evaluation of flexor tendinitis of the third digit on the right hand.  She is to return at the next available office appointment.

## 2025-05-26 PROCEDURE — RXMED WILLOW AMBULATORY MEDICATION CHARGE

## 2025-05-27 ENCOUNTER — OFFICE VISIT (OUTPATIENT)
Dept: ORTHOPEDIC SURGERY | Facility: CLINIC | Age: 65
End: 2025-05-27
Payer: COMMERCIAL

## 2025-05-27 DIAGNOSIS — M19.90 ARTHRITIS: ICD-10-CM

## 2025-05-27 DIAGNOSIS — T84.84XA PAINFUL TOTAL KNEE REPLACEMENT, INITIAL ENCOUNTER: Primary | ICD-10-CM

## 2025-05-27 DIAGNOSIS — Z96.659 PAINFUL TOTAL KNEE REPLACEMENT, INITIAL ENCOUNTER: Primary | ICD-10-CM

## 2025-05-27 DIAGNOSIS — M25.561 RIGHT KNEE PAIN, UNSPECIFIED CHRONICITY: ICD-10-CM

## 2025-05-27 PROCEDURE — RXMED WILLOW AMBULATORY MEDICATION CHARGE

## 2025-05-27 PROCEDURE — 99214 OFFICE O/P EST MOD 30 MIN: CPT | Performed by: STUDENT IN AN ORGANIZED HEALTH CARE EDUCATION/TRAINING PROGRAM

## 2025-05-27 PROCEDURE — 1159F MED LIST DOCD IN RCRD: CPT | Performed by: STUDENT IN AN ORGANIZED HEALTH CARE EDUCATION/TRAINING PROGRAM

## 2025-05-27 NOTE — PROGRESS NOTES
PRIMARY CARE PHYSICIAN: Chan Hernandez MD  REFERRING PROVIDER: No referring provider defined for this encounter.       SUBJECTIVE  CHIEF COMPLAINT:   Chief Complaint   Patient presents with    Right Knee - New Patient Visit, Pain        HPI: Lara Morris is a pleasant 65 y.o. year-old female who is seen today for evaluation of right knee pain. She had both knees replaced 15 years prior and is scheduled for a R SHERRY in October with us. The pain has been present for 3 to 4 weeks. The onset of pain began after biking 26 miles for the first time in a while.  Lara Morris states that the pain is located in the medial aspect of the knee.  Lara Morris denies any history of inflammatory arthritis.  The pain is aggravated by exercise and alleviated by tylenol and NSAIDs. The pain has significantly improved over the last few weeks and at worst now is a 2/10. The patient denies any numbness or tingling of the right lower extremity.    FUNCTIONAL STATUS: not limited.  AMBULATORY STATUS: Independent community ambulation without devices  PREVIOUS TREATMENTS: activity modification and over the counter medications   HISTORY OF SURGERY ON AFFECTED KNEE(S): Yes R TKA 15 years prior w Dr. Quintero  HIP OR GROIN PAIN REPORTED: Yes   SYMPTOMS INTERFERING WITH SLEEP: No   INSTABILITY: No   IMPACTING QUALITY OF LIFE: No     REVIEW OF SYSTEMS  The patient denies any fever, chills, chest pain, shortness of breath or difficulty breathing.  Patient denies any numbness, tingling, or radicular symptoms.  Adult patient history sheet was filled out by the patient today in clinic and will be scanned into the EMR.  I personally reviewed this form which will be scanned into the EMR.  This includes Past Medical History, Past Surgical History, Medications, Allergies, Social History, Family History and 12 point review of systems.    Medical History[1]     Allergies[2]     Surgical History[3]     Family History[4]     Social History     Socioeconomic History     Marital status: Significant Other     Spouse name: Not on file    Number of children: Not on file    Years of education: Not on file    Highest education level: Not on file   Occupational History    Not on file   Tobacco Use    Smoking status: Former     Types: Cigarettes     Passive exposure: Past    Smokeless tobacco: Never   Vaping Use    Vaping status: Never Used   Substance and Sexual Activity    Alcohol use: Never    Drug use: Never    Sexual activity: Defer     Birth control/protection: Female Sterilization   Other Topics Concern    Not on file   Social History Narrative    Not on file     Social Drivers of Health     Financial Resource Strain: Not on file   Food Insecurity: Not on file   Transportation Needs: Not on file   Physical Activity: Not on file   Stress: Not on file   Social Connections: Not on file   Intimate Partner Violence: Not on file   Housing Stability: Not on file        CURRENT MEDICATIONS:   Current Medications[5]     OBJECTIVE    PHYSICAL EXAM  There is no height or weight on file to calculate BMI.    General: Well-appearing female in no acute distress.  Awake, alert and oriented.  Pleasant and cooperative.  Respiratory: Non-labored breathing  Mood: Euthymic   Gait: antalgic   Assistive Device: None     Affected Right Knee  Limb Alignment: Equal  ROM: 0 -110  Stable to varus and valgus stress at full extension and 30 degrees of flexion  Skin: Intact, no abrasions or draining sinuses  Effusion: Moderate  Quad Strength: 5/5  Hamstring Strength: 5/5  Patella Crepitus: None  Patella Grind: Negative  Tenderness: Moderate TTP over medial musculature and pes anserine  Sensation: Intact to light touch distally  Motor function: Able to fire TA, EHL, G/S  Pulses: Palpable DP pulse    Unaffected Left Knee  Skin: Intact  ROM: 0-120  Effusion: None  No tenderness to palpation on exam    IMAGING:  AP / lateral/ mid-flexion/sunrise views: Independent review of right knee x-rays was performed. The  findings were reviewed with the patient. The prosthetic knee components are well aligned without signs of loosening or failure. Symmetric joint line. No fracture or bony abnormality. There are osteophytes off the patella.       ASSESSMENT & PLAN    IMPRESSION:  Lara Morris is a 65 y.o. female s/p R TKA 15 years prior complaining of new onset R knee pain due to pes anserine bursitis.     PLAN:  We discussed with the patient that he prior knee replacement appears stable. The pain is likely due to pes anserine bursitis cause by biking. We went over treatment methods including massage, ice and over the counter medications. We did discuss injections but advised against at this time since it is in the area of a previous TKA.     We will obtain inflammatory markers (CRP and ESR) as a precautionary measure since she is on immune therapy and undergoing a R SHERRY in October    She will follow up in September as scheduled for her pre surgical visit.                                                   [1]   Past Medical History:  Diagnosis Date    Asthma     Diaphragmatic stimulation by cardiac pacemaker     Hyperlipidemia     Hypothyroid     Incisional hernia without obstruction or gangrene 01/22/2019    Incisional hernia, without obstruction or gangrene    Noninfective gastroenteritis and colitis, unspecified 04/22/2019    Ileitis    Personal history of other diseases of the circulatory system 01/06/2021    History of hypertension    Personal history of other diseases of the digestive system 06/07/2018    History of umbilical hernia    Personal history of other diseases of the nervous system and sense organs     History of peripheral neuropathy    Thyrotoxicosis, unspecified without thyrotoxic crisis or storm 01/25/2014    Hyperthyroidism   [2]   Allergies  Allergen Reactions    Adhesive Hives     All adhesives (including clonidine patches) caused blistering hives    Gamunex-C [Immune Globul G-Gly-Iga Avg 46] Anaphylaxis    Immune  Globulin Hives and Shortness of breath    Immune Globulin,Gamma (Igg) Human Hives and Shortness of breath    Animal Dander Runny nose     Sneezing    Atorvastatin Myalgia    Codeine Nausea/vomiting    House Dust Runny nose     Sneezing    Ropinirole Hives    Tramadol Nausea/vomiting    Ciprofloxacin Hives and Rash    Erythromycin Hives and Rash   [3]   Past Surgical History:  Procedure Laterality Date    COLONOSCOPY  11/04/2013    Complete Colonoscopy    GALLBLADDER SURGERY  11/03/2016    Gallbladder Surgery    HERNIA REPAIR  12/13/2016    Inguinal Hernia Repair    HYSTERECTOMY  11/03/2016    Hysterectomy    THYROID SURGERY  11/03/2016    Thyroid Surgery    TONSILLECTOMY  11/03/2016    Tonsillectomy With Adenoidectomy    TOTAL KNEE ARTHROPLASTY  06/07/2020    Total Knee Arthroplasty    VENTRAL HERNIA REPAIR  09/17/2018    Ventral Hernia Repair   [4]   Family History  Problem Relation Name Age of Onset    Multiple sclerosis Mother      Coronary artery disease Father      Diabetes Father      Hypertension Father      Cystic fibrosis Sister          with gi manifestations    Cystic fibrosis Brother      Cystic fibrosis Son      Cystic fibrosis Other paternal relatives    [5]   Current Outpatient Medications   Medication Sig Dispense Refill    acetaminophen (Tylenol) 325 mg tablet Take 2 tablets (650 mg) by mouth every 6 hours if needed for mild pain (1 - 3).      acyclovir-hydrocortisone 5-1 % cream Xerese 5-1 % External Cream Quantity: 5 Refills: 0 DO Start : 22-Mar-2018 Active      albuterol 90 mcg/actuation inhaler Inhale 2 puff by mouth every 4 hours as needed Inhalation 54 g 6    albuterol 90 mcg/actuation inhaler Inhale 2 puffs every 4 hours as needed 54 g 6    albuterol 90 mcg/actuation inhaler Inhale 2 puff every 4 hours as needed 54 g 3    Autolet (Accu-Chek Soft Dev Lancets) lancing device Use as instructed to test blood sugar once daily 1 each 0    azelastine (Astelin) 137 mcg (0.1 %) nasal spray USE 1-2  SPRAYS IN EACH NOSTRIL ONCE DAILY TO TWO TIMES A DAY AS NEEDED FOR NASAL DRAINAGE 90 mL 3    azelastine (Astelin) 137 mcg (0.1 %) nasal spray Instill 1-2 Spray(s) 1-2 times daily as needed for nasal drainage 90 mL 3    blood sugar diagnostic (Accu-Chek Guide test strips) strip 1 each once daily. 100 each 3    blood-glucose meter (Accu-Chek Guide Glucose Meter) misc 1 each once daily. 1 each 0    calcium citrate (Calcitrate) 200 mg (950 mg) tablet Take 1 tablet by mouth once daily.      cetirizine (ZyrTEC) 10 mg tablet Take 1 tablet (10 mg) by mouth 2 times a day.      cholecalciferol (Vitamin D-3) 25 MCG (1000 UT) tablet Take 1 tablet (25 mcg) by mouth once daily.      clobetasol (Temovate) 0.05 % cream Apply 1 application twice daily as needed for rash. 60 g 1    clobetasol (Temovate) 0.05 % ointment Apply 1 application 2 times daily for 2 weeks and then as needed for 2 weeks. 45 g 2    cyanocobalamin (Vitamin B-12) 1,000 mcg/mL injection Inject 1 mL into the muscle every 3 weeks. 3 mL 4    cyanocobalamin (Vitamin B-12) 1,000 mcg/mL injection Inject 1 mL (1000 mcg) into the muscle every 3 weeks 3 mL 4    cyanocobalamin (Vitamin B-12) 1,000 mcg/mL injection Inject 1 ml as directed once a month 3 mL 4    DULoxetine (Cymbalta) 20 mg DR capsule Take 1 capsule (20 mg) by mouth once daily. Do not crush or chew. 30 capsule 11    eletriptan (Relpax) 40 mg tablet Take 1 tablet at onset of headache. May repeat in 2 hours one time. 30 tablet 17    epinastine (Elestat) 0.05 % ophthalmic solution Instill 1 drop into affected eye twice daily as needed 5 mL 2    EPINEPHrine (EpiPen 2-Korey) 0.3 mg/0.3 mL injection syringe Use intramuscularly as needed for allergic reaction. 10 each 6    esomeprazole (NexIUM) 40 mg DR capsule TAKE 1 CAPSULE BY MOUTH EVERY MORNING BEFORE MEALS 90 capsule 3    ezetimibe (Zetia) 10 mg tablet Take 1 tablet (10 mg) by mouth once daily. 90 tablet 3    ferrous fumarate-vitamin C (Romulo-Sequeles 65-25) Take  1 tablet by mouth 3 times daily (morning, midday, late afternoon). Do not crush, chew, or split.      fluticasone (Flonase) 50 mcg/actuation nasal spray Administer 2 sprays into each nostril once daily.      fluticasone propionate (Xhance) 93 mcg/actuation aerosol breath activated Instill 1 spray as directed twice daily 16 mL 6    fluticasone-umeclidin-vilanter (Trelegy Ellipta) 100-62.5-25 mcg blister with device Inhale 1 puff by mouth daily 60 each 11    fluticasone-umeclidin-vilanter (Trelegy Ellipta) 200-62.5-25 mcg blister with device Inhale 1 Puff(s) by mouth daily 60 each 11    gabapentin (Neurontin) 100 mg capsule Take 1 capsule (100 mg) by mouth once daily. 90 capsule 1    gabapentin (Neurontin) 300 mg capsule Take 2 capsules (600 mg) by mouth once daily at bedtime. 180 capsule 0    ginseng 100 mg capsule Take 100 mg by mouth once daily.      ibuprofen 800 mg tablet Take 1 tablet (800 mg) by mouth 2 times a day as needed for moderate pain (4 - 6). 180 tablet 1    ipratropium-albuteroL (Duo-Neb) 0.5-2.5 mg/3 mL nebulizer solution Take 3 mL by nebulization 4 times a day as needed for shortness of breath or wheezing.      lancets misc Test blood sugar once daily 100 each 3    levothyroxine (Synthroid, Levoxyl) 150 mcg tablet TAKE 1 TABLET BY MOUTH ONCE DAILY 90 tablet 3    lidocaine-prilocaine (Emla) 2.5-2.5 % cream Apply 1 application once a day as needed 60 g 6    mepolizumab (Nucala) 100 mg/mL auto-injector Inject 100 mg (1 pen) under the skin every 4 weeks as directed 1 mL 12    mepolizumab (Nucala) 100 mg/mL auto-injector Inject 100 mg (1 pen) under the skin every 4 weeks as directed 1 mL 28    montelukast (Singulair) 10 mg tablet TAKE 1 TABLET BY MOUTH EVERY DAY 90 tablet 3    montelukast (Singulair) 10 mg tablet TAKE 1 TABLET BY MOUTH EVERY DAY 90 tablet 3    montelukast (Singulair) 10 mg tablet TAKE 1 TABLET BY MOUTH EVERY DAY 90 tablet 3    MULTIVITAMIN ORAL Take 1 tablet by mouth once daily.       "needle, disp, 25 gauge (Monoject Hypodermic Needles) 25 gauge x 5/8\" needle Use 1 for injection every 4 weeks 3 each 11    omega 3-dha-epa-fish oil (Fish OiL) 1,200 (144-216) mg capsule Take 1 capsule (1,200 mg) by mouth once daily.      omeprazole (PriLOSEC) 40 mg DR capsule Take 1 capsule (40 mg) by mouth once daily in the morning. Take before meals. Do not crush or chew. 90 capsule 3    onabotulinumtoxinA (Botox) 100 unit injection PHYSICIAN TO INJECT UP  UNITS INTRAMUSCULARLY EVERY 90 DAYS. FOR OFFICE USE ONLY 1 each 3    pemivibart (Pemgarda, EUA,) 125 mg/mL solution Providers office to inject 4500 Milligram(s) intravenously every 12 weeks. For  only. 36 mL 3    potassium &magnesium aspartate (magnesium, potassium aspartate) 250-250 mg capsule Take 2 capsules by mouth once daily.      predniSONE (Deltasone) 10 mg tablet Take 4 tabs by mouth daily for 2 days, then 3 tabs daily for 2 days, then 2 tabs daily for 2 days, then 1 tab daily for 2 days 20 tablet 0    RESVERATROL ORAL Take 1,450 mg by mouth once daily.      rizatriptan (Maxalt) 10 mg tablet TAKE 1 TABLET BY MOUTH AT BEGINNING OF MIGRAINE. TAKE 1 BY MOUTH EVERY 2 HOURS IF MIGRAINE NOT RESOLVED. MAXIMUM OF 3 TABLET BY MOUTH IN 24 HOURS 45 tablet 11    sulfamethoxazole-trimethoprim (Bactrim DS) 800-160 mg tablet Take 1 tablet(s) by mouth once daily 90 tablet 3    syringe with needle (BD Luer-Britni Syringe) 3 mL 25 x 5/8\" syringe Use as directed for B12 injections 2 each 11    syringe, disposable, (Monoject Tuberculin Syringe) 1 mL syringe use 2 syringe(s) once a month 2 each 11    syringe, disposable, (Monoject Tuberculin Syringe) 1 mL syringe Use as directed 2 Syringe(s) once a month 2 each 11    tirzepatide (Mounjaro) 15 mg/0.5 mL pen injector Inject 15 mg under the skin 1 (one) time per week. 2 mL 11    turmeric 400 mg capsule Take by mouth.       No current facility-administered medications for this visit.     "

## 2025-05-28 ENCOUNTER — SPECIALTY PHARMACY (OUTPATIENT)
Dept: PHARMACY | Facility: CLINIC | Age: 65
End: 2025-05-28

## 2025-05-28 ENCOUNTER — PHARMACY VISIT (OUTPATIENT)
Dept: PHARMACY | Facility: CLINIC | Age: 65
End: 2025-05-28
Payer: COMMERCIAL

## 2025-05-28 PROCEDURE — RXMED WILLOW AMBULATORY MEDICATION CHARGE

## 2025-05-28 RX ORDER — IBUPROFEN 800 MG/1
800 TABLET, FILM COATED ORAL 2 TIMES DAILY PRN
Qty: 180 TABLET | Refills: 1 | Status: SHIPPED | OUTPATIENT
Start: 2025-05-28 | End: 2026-05-28

## 2025-05-29 ENCOUNTER — PHARMACY VISIT (OUTPATIENT)
Dept: PHARMACY | Facility: CLINIC | Age: 65
End: 2025-05-29
Payer: COMMERCIAL

## 2025-05-29 ENCOUNTER — HOSPITAL ENCOUNTER (OUTPATIENT)
Dept: CARDIOLOGY | Facility: CLINIC | Age: 65
Discharge: HOME | End: 2025-05-29
Payer: COMMERCIAL

## 2025-05-29 ENCOUNTER — OFFICE VISIT (OUTPATIENT)
Dept: CARDIOLOGY | Facility: CLINIC | Age: 65
End: 2025-05-29
Payer: COMMERCIAL

## 2025-05-29 VITALS
HEART RATE: 69 BPM | WEIGHT: 231.9 LBS | BODY MASS INDEX: 35.15 KG/M2 | SYSTOLIC BLOOD PRESSURE: 103 MMHG | HEIGHT: 68 IN | OXYGEN SATURATION: 98 % | DIASTOLIC BLOOD PRESSURE: 68 MMHG

## 2025-05-29 DIAGNOSIS — E78.5 DYSLIPIDEMIA: ICD-10-CM

## 2025-05-29 DIAGNOSIS — I35.0 NONRHEUMATIC AORTIC VALVE STENOSIS: Primary | ICD-10-CM

## 2025-05-29 DIAGNOSIS — I35.0 NONRHEUMATIC AORTIC VALVE STENOSIS: ICD-10-CM

## 2025-05-29 LAB
AORTIC VALVE MEAN GRADIENT: 16 MMHG
AORTIC VALVE PEAK VELOCITY: 2.82 M/S
AV PEAK GRADIENT: 32 MMHG
AVA (PEAK VEL): 1.61 CM2
AVA (VTI): 1.4 CM2
EJECTION FRACTION APICAL 4 CHAMBER: 64
EJECTION FRACTION: 68 %
LEFT ATRIUM VOLUME AREA LENGTH INDEX BSA: 40.3 ML/M2
LEFT VENTRICLE INTERNAL DIMENSION DIASTOLE: 5 CM (ref 3.5–6)
LEFT VENTRICULAR OUTFLOW TRACT DIAMETER: 2 CM
MITRAL VALVE E/A RATIO: 0.84
RIGHT VENTRICLE FREE WALL PEAK S': 11 CM/S
TRICUSPID ANNULAR PLANE SYSTOLIC EXCURSION: 3.2 CM

## 2025-05-29 PROCEDURE — 99214 OFFICE O/P EST MOD 30 MIN: CPT | Performed by: INTERNAL MEDICINE

## 2025-05-29 PROCEDURE — 99212 OFFICE O/P EST SF 10 MIN: CPT | Mod: 25 | Performed by: INTERNAL MEDICINE

## 2025-05-29 PROCEDURE — 3008F BODY MASS INDEX DOCD: CPT | Performed by: INTERNAL MEDICINE

## 2025-05-29 PROCEDURE — 3074F SYST BP LT 130 MM HG: CPT | Performed by: INTERNAL MEDICINE

## 2025-05-29 PROCEDURE — 93306 TTE W/DOPPLER COMPLETE: CPT | Performed by: INTERNAL MEDICINE

## 2025-05-29 PROCEDURE — C8929 TTE W OR WO FOL WCON,DOPPLER: HCPCS

## 2025-05-29 PROCEDURE — 1126F AMNT PAIN NOTED NONE PRSNT: CPT | Performed by: INTERNAL MEDICINE

## 2025-05-29 PROCEDURE — 1159F MED LIST DOCD IN RCRD: CPT | Performed by: INTERNAL MEDICINE

## 2025-05-29 PROCEDURE — 3078F DIAST BP <80 MM HG: CPT | Performed by: INTERNAL MEDICINE

## 2025-05-29 PROCEDURE — 2500000004 HC RX 250 GENERAL PHARMACY W/ HCPCS (ALT 636 FOR OP/ED): Mod: JZ | Performed by: INTERNAL MEDICINE

## 2025-05-29 PROCEDURE — 93005 ELECTROCARDIOGRAM TRACING: CPT | Performed by: INTERNAL MEDICINE

## 2025-05-29 RX ADMIN — PERFLUTREN 10 ML OF DILUTION: 6.52 INJECTION, SUSPENSION INTRAVENOUS at 16:40

## 2025-05-29 ASSESSMENT — PATIENT HEALTH QUESTIONNAIRE - PHQ9
2. FEELING DOWN, DEPRESSED OR HOPELESS: NOT AT ALL
1. LITTLE INTEREST OR PLEASURE IN DOING THINGS: NOT AT ALL
SUM OF ALL RESPONSES TO PHQ9 QUESTIONS 1 AND 2: 0

## 2025-05-29 ASSESSMENT — ENCOUNTER SYMPTOMS
DEPRESSION: 0
OCCASIONAL FEELINGS OF UNSTEADINESS: 0
LOSS OF SENSATION IN FEET: 0

## 2025-05-29 ASSESSMENT — PAIN SCALES - GENERAL: PAINLEVEL_OUTOF10: 0-NO PAIN

## 2025-05-29 ASSESSMENT — COLUMBIA-SUICIDE SEVERITY RATING SCALE - C-SSRS
6. HAVE YOU EVER DONE ANYTHING, STARTED TO DO ANYTHING, OR PREPARED TO DO ANYTHING TO END YOUR LIFE?: NO
1. IN THE PAST MONTH, HAVE YOU WISHED YOU WERE DEAD OR WISHED YOU COULD GO TO SLEEP AND NOT WAKE UP?: NO
2. HAVE YOU ACTUALLY HAD ANY THOUGHTS OF KILLING YOURSELF?: NO

## 2025-05-29 NOTE — PROGRESS NOTES
Subjective   Lara Morris is a 65 y.o. female who presents to the [facility] for evaluation of mild aortic stenosis. Last seen in May 2024.     She had repeat echocardiogram today shows mild aortic stenosis. Her exertional dyspnea is markedly improved s/p June 2023 diaphragm external pacemaker implantation by Dr. Torres and can now walk 1/2 mile at a time.     No active cardiac symptoms of chest pain, PND, orthopnea, KARTHIK, palpitations, syncope, or claudication.     50 lb weight loss on 1 year use of Mounjaro GLP-1 for type 2 diabetes. Now off insulin and metformin.    LDL increased after she switched evening snack to turkey jerky and has readjusted diet after March 2025 lab work showed 40 pt jump in LDL.     Past Medical History:  1. Aortic stenosis, mild  2. Dyslipidemia  3. CVID  4. 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 Type 2 diabetes   6. JOSÉ MIGUEL on CPAP     Social History:  Nonsmoker     Family History:  Father had CAD.     Review of Systems    A 14 point review of systems was asked. All questions were negative except for pertinent positives listed in the HPI.     Current Outpatient Medications on File Prior to Visit   Medication Sig Dispense Refill    acetaminophen (Tylenol) 325 mg tablet Take 2 tablets (650 mg) by mouth every 6 hours if needed for mild pain (1 - 3).      acyclovir-hydrocortisone 5-1 % cream Xerese 5-1 % External Cream Quantity: 5 Refills: 0 DO Start : 22-Mar-2018 Active      albuterol 90 mcg/actuation inhaler Inhale 2 puff by mouth every 4 hours as needed Inhalation 54 g 6    albuterol 90 mcg/actuation inhaler Inhale 2 puffs every 4 hours as needed 54 g 6    albuterol 90 mcg/actuation inhaler Inhale 2 puff every 4 hours as needed 54 g 3    Autolet (Accu-Chek Soft Dev Lancets) lancing device Use as instructed to test blood sugar once daily 1 each 0    azelastine (Astelin) 137 mcg (0.1 %) nasal spray USE 1-2 SPRAYS IN EACH NOSTRIL ONCE DAILY TO  TWO TIMES A DAY AS NEEDED FOR NASAL DRAINAGE 90 mL 3    azelastine (Astelin) 137 mcg (0.1 %) nasal spray Instill 1-2 Spray(s) 1-2 times daily as needed for nasal drainage 90 mL 3    blood sugar diagnostic (Accu-Chek Guide test strips) strip 1 each once daily. 100 each 3    blood-glucose meter (Accu-Chek Guide Glucose Meter) misc 1 each once daily. 1 each 0    calcium citrate (Calcitrate) 200 mg (950 mg) tablet Take 1 tablet by mouth once daily.      cetirizine (ZyrTEC) 10 mg tablet Take 1 tablet (10 mg) by mouth 2 times a day.      cholecalciferol (Vitamin D-3) 25 MCG (1000 UT) tablet Take 1 tablet (25 mcg) by mouth once daily.      clobetasol (Temovate) 0.05 % cream Apply 1 application twice daily as needed for rash. 60 g 1    clobetasol (Temovate) 0.05 % ointment Apply 1 application 2 times daily for 2 weeks and then as needed for 2 weeks. 45 g 2    cyanocobalamin (Vitamin B-12) 1,000 mcg/mL injection Inject 1 mL into the muscle every 3 weeks. 3 mL 4    cyanocobalamin (Vitamin B-12) 1,000 mcg/mL injection Inject 1 mL (1000 mcg) into the muscle every 3 weeks 3 mL 4    cyanocobalamin (Vitamin B-12) 1,000 mcg/mL injection Inject 1 ml as directed once a month 3 mL 4    DULoxetine (Cymbalta) 20 mg DR capsule Take 1 capsule (20 mg) by mouth once daily. Do not crush or chew. 30 capsule 11    eletriptan (Relpax) 40 mg tablet Take 1 tablet at onset of headache. May repeat in 2 hours one time. 30 tablet 17    epinastine (Elestat) 0.05 % ophthalmic solution Instill 1 drop into affected eye twice daily as needed 5 mL 2    EPINEPHrine (EpiPen 2-Korey) 0.3 mg/0.3 mL injection syringe Use intramuscularly as needed for allergic reaction. 10 each 6    esomeprazole (NexIUM) 40 mg DR capsule TAKE 1 CAPSULE BY MOUTH EVERY MORNING BEFORE MEALS 90 capsule 3    ezetimibe (Zetia) 10 mg tablet Take 1 tablet (10 mg) by mouth once daily. 90 tablet 3    ferrous fumarate-vitamin C (Romulo-Sequeles 65-25) Take 1 tablet by mouth 3 times daily  (morning, midday, late afternoon). Do not crush, chew, or split.      fluticasone (Flonase) 50 mcg/actuation nasal spray Administer 2 sprays into each nostril once daily.      fluticasone propionate (Xhance) 93 mcg/actuation aerosol breath activated Instill 1 spray as directed twice daily 16 mL 6    fluticasone-umeclidin-vilanter (Trelegy Ellipta) 100-62.5-25 mcg blister with device Inhale 1 puff by mouth daily 60 each 11    fluticasone-umeclidin-vilanter (Trelegy Ellipta) 200-62.5-25 mcg blister with device Inhale 1 Puff(s) by mouth daily 60 each 11    gabapentin (Neurontin) 100 mg capsule Take 1 capsule (100 mg) by mouth once daily. 90 capsule 1    gabapentin (Neurontin) 300 mg capsule Take 2 capsules (600 mg) by mouth once daily at bedtime. 180 capsule 0    ginseng 100 mg capsule Take 100 mg by mouth once daily.      ibuprofen 800 mg tablet Take 1 tablet (800 mg) by mouth 2 times a day as needed for moderate pain (4 - 6). 180 tablet 1    ipratropium-albuteroL (Duo-Neb) 0.5-2.5 mg/3 mL nebulizer solution Take 3 mL by nebulization 4 times a day as needed for shortness of breath or wheezing.      lancets misc Test blood sugar once daily 100 each 3    levothyroxine (Synthroid, Levoxyl) 150 mcg tablet TAKE 1 TABLET BY MOUTH ONCE DAILY 90 tablet 3    lidocaine-prilocaine (Emla) 2.5-2.5 % cream Apply 1 application once a day as needed 60 g 6    mepolizumab (Nucala) 100 mg/mL auto-injector Inject 100 mg (1 pen) under the skin every 4 weeks as directed 1 mL 12    mepolizumab (Nucala) 100 mg/mL auto-injector Inject 100 mg (1 pen) under the skin every 4 weeks as directed 1 mL 28    montelukast (Singulair) 10 mg tablet TAKE 1 TABLET BY MOUTH EVERY DAY 90 tablet 3    montelukast (Singulair) 10 mg tablet TAKE 1 TABLET BY MOUTH EVERY DAY 90 tablet 3    montelukast (Singulair) 10 mg tablet TAKE 1 TABLET BY MOUTH EVERY DAY 90 tablet 3    MULTIVITAMIN ORAL Take 1 tablet by mouth once daily.      needle, disp, 25 gauge (Monoject  "Hypodermic Needles) 25 gauge x 5/8\" needle Use 1 for injection every 4 weeks 3 each 11    omega 3-dha-epa-fish oil (Fish OiL) 1,200 (144-216) mg capsule Take 1 capsule (1,200 mg) by mouth once daily.      omeprazole (PriLOSEC) 40 mg DR capsule Take 1 capsule (40 mg) by mouth once daily in the morning. Take before meals. Do not crush or chew. 90 capsule 3    onabotulinumtoxinA (Botox) 100 unit injection PHYSICIAN TO INJECT UP  UNITS INTRAMUSCULARLY EVERY 90 DAYS. FOR OFFICE USE ONLY 1 each 3    pemivibart (Pemgarda, EUA,) 125 mg/mL solution Providers office to inject 4500 Milligram(s) intravenously every 12 weeks. For  only. 36 mL 3    potassium &magnesium aspartate (magnesium, potassium aspartate) 250-250 mg capsule Take 2 capsules by mouth once daily.      predniSONE (Deltasone) 10 mg tablet Take 4 tabs by mouth daily for 2 days, then 3 tabs daily for 2 days, then 2 tabs daily for 2 days, then 1 tab daily for 2 days 20 tablet 0    RESVERATROL ORAL Take 1,450 mg by mouth once daily.      rizatriptan (Maxalt) 10 mg tablet TAKE 1 TABLET BY MOUTH AT BEGINNING OF MIGRAINE. TAKE 1 BY MOUTH EVERY 2 HOURS IF MIGRAINE NOT RESOLVED. MAXIMUM OF 3 TABLET BY MOUTH IN 24 HOURS 45 tablet 11    sulfamethoxazole-trimethoprim (Bactrim DS) 800-160 mg tablet Take 1 tablet(s) by mouth once daily 90 tablet 3    syringe with needle (BD Luer-Britni Syringe) 3 mL 25 x 5/8\" syringe Use as directed for B12 injections 2 each 11    syringe, disposable, (Monoject Tuberculin Syringe) 1 mL syringe use 2 syringe(s) once a month 2 each 11    syringe, disposable, (Monoject Tuberculin Syringe) 1 mL syringe Use as directed 2 Syringe(s) once a month 2 each 11    tirzepatide (Mounjaro) 15 mg/0.5 mL pen injector Inject 15 mg under the skin 1 (one) time per week. 2 mL 11    turmeric 400 mg capsule Take by mouth.      [DISCONTINUED] ibuprofen 800 mg tablet Take 1 tablet (800 mg) by mouth 2 times a day as needed for moderate pain (4 " "- 6). 180 tablet 1     Current Facility-Administered Medications on File Prior to Visit   Medication Dose Route Frequency Provider Last Rate Last Admin    [COMPLETED] perflutren lipid microspheres (Definity) injection 4 mL of dilution  4 mL of dilution intravenous Once Eric Craft MD   10 mL of dilution at 05/29/25 1640      Objective   Physical Exam  BP Readings from Last 3 Encounters:   05/29/25 103/68   05/23/25 109/66   04/15/25 122/62      Wt Readings from Last 3 Encounters:   05/29/25 105 kg (231 lb 14.4 oz)   05/23/25 107 kg (235 lb 12.8 oz)   04/15/25 109 kg (240 lb)      BMI: Estimated body mass index is 35.26 kg/m² as calculated from the following:    Height as of this encounter: 1.727 m (5' 8\").    Weight as of this encounter: 105 kg (231 lb 14.4 oz).  BSA: Estimated body surface area is 2.24 meters squared as calculated from the following:    Height as of this encounter: 1.727 m (5' 8\").    Weight as of this encounter: 105 kg (231 lb 14.4 oz).    General: no acute distress  HEENT: EOMI, no scleral icterus.  Lungs: Clear to auscultation bilaterally without wheezing, rales, or rhonchi.  Cardiovascular: Regular rhythm and rate. Normal S1 and S2. No murmurs, rubs, or gallops are appreciated. JVP normal.  Cardiovascular: 2/6 systolic ejection murmur is heard at 2nd right intercostal space.  Abdomen: Soft, nontender, nondistended. Bowel sounds present.  Extremities: Warm and well perfused with equal 2+ pulses bilaterally.  No edema present.  Neurologic: Alert and oriented x3.    I have personally reviewed the following images and laboratory findings:  Last echocardiogram:  Most recent echo, 5/29/2025: LV EF 65-70%, no LVH (LVMI 89 gm/m2), impaired relaxation diastology (E/e' 9), mild LAE (ANNEMARIE 40 ml/m2), normal RV/RA, mild aortic stenosis (peak velocity 2.82 m/sec, peak/mean 32/16 mm Hg, DI 0.44, LEIDA 1.4-1.6 cm2), trivial AI, trace MR, trace TR, unable to estimate RVSP (RAP 3 mm Hg).    5/30/2024 echo: " "LV EF 60-65%, no LVH (LVMI 69 gm/m2), impaired relaxation diastology (E/e' 9), normal LA size (ANNEMARIE 20 ml/m2), normal RV/RA, mild aortic stenosis (peak velocity 2.74 m/sec, peak/mean 30/13 mm Hg, DI 0.47, LEIDA 1.6 cm2), trivial AI, trace MR, trace TR, unable to estimate RVSP.    Last cath / stress test:  None    Most recent EC2025 ECG: Sinus rhythm, 69 bpm, 1st degree AVB ( msec). Personally reviewed in office.    2023 ECG: Sinus rhythm, 107 bpm, otherwise normal ECG. Personally reviewed in office.     Lab Results   Component Value Date    CHOL 226 (H) 2025    CHOL 180 04/10/2024    CHOL 239 (H) 2020     Lab Results   Component Value Date    HDL 38 (L) 2025    HDL 55.7 04/10/2024    HDL 40.3 2020     Lab Results   Component Value Date    LDLCALC 154 (H) 2025    LDLCALC 109 (H) 04/10/2024     Lab Results   Component Value Date    TRIG 195 (H) 2025    TRIG 77 04/10/2024    TRIG 207 (H) 2020     No components found for: \"CHOLHDL\"      Assessment/Plan   1. Aortic stenosis:  Today's echocardiogram is relatively unchanged compared to prior 2023 echocardiogram with stable mild aortic stenosis. DI decreased to 0.44 from 0.47 but in mild range. LEIDA stable at 1.4 - 1.6 cm2 estimate. Murmur is mild in category.     Repeat echocardiogram today with unchanged hemodynamic profile. Will repeat an echocardiogram in 1 year.     2. Dyslipidemia:  Continue ezetimibe 10 mg a day. Had muscle side effects on statin medications per prior chart notes. We restarted ezetimibe in May 2023 with improvement in LDL to 109 on 2024 lipids from LDL > 150 along with taking Mounjaro GLP-1. Elevated in 2025 LDL after change in snacks to jerky snacks with high cholesterol content (change to them for high protein) and has stopped. Repeat fasting lipid panel in mid summer for 6 month re-evaluation of lifestyle modification program. Goal LDL < 100.     Follow up with Dr. Craft in " 12 months with repeat echo.            SIGNATURE: Eric Craft MD PATIENT NAME: Lara Morris   DATE/TIME: May 29, 2025 4:50 PM MRN: [id number]

## 2025-05-29 NOTE — PATIENT INSTRUCTIONS
You were seen in the [facility] for your mild aortic stenosis and high cholesterol.     Your echocardiogram (ultrasound of the heart) today showed a mild amount of calcium build up and narrowing of your aortic valve called aortic stenosis. There was no change from your May 2024 test.     I recommend that we repeat an echocardiogram (ultrasound of the heart) in May 2025 for follow up imaging of your aortic valve.     I recommend that you continue ezetimibe 10 mg a day to lower your cholesterol. Your total cholesterol improved to 226 from 180 in 2024 after you added high cholesterol snacks at night. I ordered repeat lab work for you to get this summer to reassess. Continue to take Mounjaro injections and ezetimibe 10 mg a day to help lower your cholesterol.      Follow up with Dr. Craft in 12 months with repeat echocardiogram 1 hour before office visit.

## 2025-05-30 ENCOUNTER — PHARMACY VISIT (OUTPATIENT)
Dept: PHARMACY | Facility: CLINIC | Age: 65
End: 2025-05-30
Payer: COMMERCIAL

## 2025-06-02 LAB
ATRIAL RATE: 69 BPM
P AXIS: 63 DEGREES
P OFFSET: 163 MS
P ONSET: 101 MS
PR INTERVAL: 232 MS
Q ONSET: 217 MS
QRS COUNT: 11 BEATS
QRS DURATION: 92 MS
QT INTERVAL: 396 MS
QTC CALCULATION(BAZETT): 424 MS
QTC FREDERICIA: 415 MS
R AXIS: 25 DEGREES
T AXIS: 58 DEGREES
T OFFSET: 415 MS
VENTRICULAR RATE: 69 BPM

## 2025-06-10 ENCOUNTER — HOME INFUSION (OUTPATIENT)
Dept: INFUSION THERAPY | Age: 65
End: 2025-06-10
Payer: COMMERCIAL

## 2025-06-10 NOTE — PROGRESS NOTES
Patient to continue Hizentra 24g sub-q weekly - orders valid through 5/13/26  Auth through 7/19/25 June FOTM complete    Please send 50ml syringes and extra Tegaderm this delivery   Patient will  from pharmacy 6/11 or 6/13    Dispense x8 10g vials and x4 4g vials of Hizentra  = 4 doses (2x 10gm + 1x 4gm)    Infusions 6/18 - 7/15    NF Week of 7/7 - please dispense prior to auth expiration on 7/19    Will need new auth for August

## 2025-06-13 ENCOUNTER — OFFICE VISIT (OUTPATIENT)
Dept: SURGERY | Facility: CLINIC | Age: 65
End: 2025-06-13
Payer: COMMERCIAL

## 2025-06-13 ENCOUNTER — PREP FOR PROCEDURE (OUTPATIENT)
Dept: SURGERY | Facility: HOSPITAL | Age: 65
End: 2025-06-13

## 2025-06-13 ENCOUNTER — HOSPITAL ENCOUNTER (OUTPATIENT)
Dept: RADIOLOGY | Facility: HOSPITAL | Age: 65
Discharge: HOME | End: 2025-06-13
Payer: COMMERCIAL

## 2025-06-13 DIAGNOSIS — J98.6 DIAPHRAGM DYSFUNCTION: Primary | ICD-10-CM

## 2025-06-13 DIAGNOSIS — K43.2 INCISIONAL HERNIA, WITHOUT OBSTRUCTION OR GANGRENE: Primary | ICD-10-CM

## 2025-06-13 DIAGNOSIS — J98.6 DIAPHRAGM DYSFUNCTION: ICD-10-CM

## 2025-06-13 PROCEDURE — 71046 X-RAY EXAM CHEST 2 VIEWS: CPT

## 2025-06-13 PROCEDURE — 99214 OFFICE O/P EST MOD 30 MIN: CPT | Performed by: NURSE PRACTITIONER

## 2025-06-13 PROCEDURE — 71046 X-RAY EXAM CHEST 2 VIEWS: CPT | Performed by: RADIOLOGY

## 2025-06-13 PROCEDURE — RXMED WILLOW AMBULATORY MEDICATION CHARGE

## 2025-06-13 NOTE — PROGRESS NOTES
Subjective   Patient ID: Lara Morris is a 65 y.o. female.    HPI DP surgery was done Jun 19, 2023. Onset symptoms Jan 2023.  She came in today for routine follow up. She has continued to pace at night since last visit. She continues to report her breathing is better compared to pre implant. She recently saw Dr. Torres regarding her hernia and the plan is to do hernia repair at same time as explant.     Review of Systems    Objective   Physical Exam  No increase work of breathing    Assessment/Plan   Diagnoses and all orders for this visit:  Diaphragm dysfunction  -     FL sniff test; Future  -     XR chest 2 views; Future    She did have CXR done today and the lateral view shows improvement of the posterior diaphragm. EMG is better today compared to prior.   She has continued to pace at night so we will stop pacing now. She will return 8/6 for repeat SNIFF and we will proceed with hernia/explant surgery 8/11/25

## 2025-06-16 PROBLEM — K43.2 INCISIONAL HERNIA, WITHOUT OBSTRUCTION OR GANGRENE: Status: ACTIVE | Noted: 2025-06-13

## 2025-06-17 PROCEDURE — RXMED WILLOW AMBULATORY MEDICATION CHARGE

## 2025-06-20 ENCOUNTER — PHARMACY VISIT (OUTPATIENT)
Dept: PHARMACY | Facility: CLINIC | Age: 65
End: 2025-06-20
Payer: COMMERCIAL

## 2025-06-24 ENCOUNTER — SPECIALTY PHARMACY (OUTPATIENT)
Dept: PHARMACY | Facility: CLINIC | Age: 65
End: 2025-06-24

## 2025-06-26 PROCEDURE — RXMED WILLOW AMBULATORY MEDICATION CHARGE

## 2025-06-27 ENCOUNTER — PHARMACY VISIT (OUTPATIENT)
Dept: PHARMACY | Facility: CLINIC | Age: 65
End: 2025-06-27
Payer: COMMERCIAL

## 2025-06-28 DIAGNOSIS — K21.9 GASTROESOPHAGEAL REFLUX DISEASE WITHOUT ESOPHAGITIS: ICD-10-CM

## 2025-06-28 DIAGNOSIS — E78.5 DYSLIPIDEMIA: ICD-10-CM

## 2025-06-28 PROCEDURE — RXMED WILLOW AMBULATORY MEDICATION CHARGE

## 2025-06-30 ENCOUNTER — PHARMACY VISIT (OUTPATIENT)
Dept: PHARMACY | Facility: CLINIC | Age: 65
End: 2025-06-30
Payer: COMMERCIAL

## 2025-06-30 PROCEDURE — RXMED WILLOW AMBULATORY MEDICATION CHARGE

## 2025-06-30 RX ORDER — OMEPRAZOLE 40 MG/1
40 CAPSULE, DELAYED RELEASE ORAL
Qty: 90 CAPSULE | Refills: 3 | Status: SHIPPED | OUTPATIENT
Start: 2025-06-30 | End: 2026-06-30

## 2025-06-30 RX ORDER — EZETIMIBE 10 MG/1
10 TABLET ORAL DAILY
Qty: 90 TABLET | Refills: 3 | Status: SHIPPED | OUTPATIENT
Start: 2025-06-30

## 2025-07-01 ENCOUNTER — PHARMACY VISIT (OUTPATIENT)
Dept: PHARMACY | Facility: CLINIC | Age: 65
End: 2025-07-01
Payer: COMMERCIAL

## 2025-07-01 ENCOUNTER — APPOINTMENT (OUTPATIENT)
Dept: NEUROLOGY | Facility: CLINIC | Age: 65
End: 2025-07-01
Payer: COMMERCIAL

## 2025-07-02 ENCOUNTER — PHARMACY VISIT (OUTPATIENT)
Dept: PHARMACY | Facility: CLINIC | Age: 65
End: 2025-07-02
Payer: COMMERCIAL

## 2025-07-02 ENCOUNTER — SPECIALTY PHARMACY (OUTPATIENT)
Dept: PHARMACY | Facility: CLINIC | Age: 65
End: 2025-07-02

## 2025-07-02 ENCOUNTER — PHARMACY VISIT (OUTPATIENT)
Dept: PHARMACY | Facility: CLINIC | Age: 65
End: 2025-07-02

## 2025-07-07 ENCOUNTER — PROCEDURE VISIT (OUTPATIENT)
Dept: NEUROLOGY | Facility: CLINIC | Age: 65
End: 2025-07-07
Payer: COMMERCIAL

## 2025-07-07 ENCOUNTER — APPOINTMENT (OUTPATIENT)
Dept: NEUROLOGY | Facility: CLINIC | Age: 65
End: 2025-07-07
Payer: COMMERCIAL

## 2025-07-07 DIAGNOSIS — G51.32 CLONIC HEMIFACIAL SPASM OF MUSCLE OF LEFT SIDE OF FACE: Primary | ICD-10-CM

## 2025-07-07 DIAGNOSIS — E06.3 HYPOTHYROIDISM DUE TO HASHIMOTO'S THYROIDITIS: ICD-10-CM

## 2025-07-07 PROCEDURE — 64612 DESTROY NERVE FACE MUSCLE: CPT | Performed by: PSYCHIATRY & NEUROLOGY

## 2025-07-07 PROCEDURE — RXMED WILLOW AMBULATORY MEDICATION CHARGE

## 2025-07-07 RX ORDER — LEVOTHYROXINE SODIUM 150 UG/1
150 TABLET ORAL DAILY
Qty: 90 TABLET | Refills: 3 | Status: SHIPPED | OUTPATIENT
Start: 2025-07-07 | End: 2026-07-07

## 2025-07-07 NOTE — PROGRESS NOTES
Neurology Botulinum Injection    Subjective   Lara Morris is an 65 y.o. female here for medical botulinum injection for Clonic hemifacial spasm of muscle of left side of face [G51.32].      Injections worked well last time. Partially worn off. Mild L lower facial weakness once, but it did not happen last 3 times.      PMHx   CVID , taking IVIG for 12 years   Asthma  osteoarthritis   B12 deficiency   bilateral hearing impairment   wears CPAP   diaphragmatic pacemaker placed     PSHx  Bilateral knee replacement      Objective   Neurological Exam  Physical Exam  Procedures  Prior to the start of the procedure a time out was taken and the following were verified: the identity of the patient using two patient identifiers (name, ) - this was verified by the patient      The area was prepped in the usual sterile fashion. Botulinum toxin type A (Botox)was injected as follows:    Dose       Sites Muscle  5 units     1      left superomedial orbicularis ocularis   5 units     1      left superolateral orbicularis ocularis   5 units     1      left lateral orbicularis ocularis   5 units     1      left Inferior lateral orbicularis ocularis   5 units     1      left Inferior orbicularis ocularis   3 units     1      left zygomaticus   1 unit      1      left risorius  1 unit      1      left upper orbicularis oris  10 Unit    1      left platysma      Patient supplied Botox.  Total botulinum toxin used was 40 units, 60 discarded, patient supplied 100 units     Assessment/Plan hemifacial spasm

## 2025-07-08 ENCOUNTER — HOME INFUSION (OUTPATIENT)
Dept: INFUSION THERAPY | Age: 65
End: 2025-07-08
Payer: COMMERCIAL

## 2025-07-08 NOTE — PROGRESS NOTES
Chart Review - to continue Hizentra 24g sub-q weekly - orders valid through 5/13/26  Auth trough 7/20/25 July FOTM complete    Dispense x8 10g vials and x4 4g vials for 24g doses for 4 weeks  (2x 10gm +1 x4g = 24g dose_  Infusions 7/16 - 8/12  On priority list for August, needs new auth    Taya Kit in date    Patient will  7/11    Next week of 8/4 arrange with patient

## 2025-07-09 ENCOUNTER — DOCUMENTATION (OUTPATIENT)
Dept: INFUSION THERAPY | Age: 65
End: 2025-07-09
Payer: COMMERCIAL

## 2025-07-09 NOTE — PROGRESS NOTES
Conversed with patient. Patient will  at [facility] Friday 7/11 after 11am. Will  from warehouse or pharmacy staff.     Sending drug, standard supplies for 4 dose of Hizentra delivered subcutaneously  via [employer] . Patient requesting 9 mm needle sets again and would like to try smaller 2x2 tegaderm for needle sites- sending x34 (4 sites x 4 doses x 2 each dose + 2 spare) .     Patient had no questions for pharmacist

## 2025-07-10 ENCOUNTER — PHARMACY VISIT (OUTPATIENT)
Dept: PHARMACY | Facility: CLINIC | Age: 65
End: 2025-07-10
Payer: COMMERCIAL

## 2025-07-16 PROCEDURE — RXMED WILLOW AMBULATORY MEDICATION CHARGE

## 2025-07-27 PROCEDURE — RXMED WILLOW AMBULATORY MEDICATION CHARGE

## 2025-07-28 ENCOUNTER — PHARMACY VISIT (OUTPATIENT)
Dept: PHARMACY | Facility: CLINIC | Age: 65
End: 2025-07-28
Payer: COMMERCIAL

## 2025-07-28 PROCEDURE — RXMED WILLOW AMBULATORY MEDICATION CHARGE

## 2025-07-29 ENCOUNTER — PHARMACY VISIT (OUTPATIENT)
Dept: PHARMACY | Facility: CLINIC | Age: 65
End: 2025-07-29
Payer: COMMERCIAL

## 2025-07-30 ENCOUNTER — SPECIALTY PHARMACY (OUTPATIENT)
Dept: PHARMACY | Facility: CLINIC | Age: 65
End: 2025-07-30

## 2025-07-30 PROCEDURE — RXMED WILLOW AMBULATORY MEDICATION CHARGE

## 2025-08-01 DIAGNOSIS — B99.9 RECURRENT INFECTIONS: ICD-10-CM

## 2025-08-04 ENCOUNTER — PHARMACY VISIT (OUTPATIENT)
Dept: PHARMACY | Facility: CLINIC | Age: 65
End: 2025-08-04
Payer: COMMERCIAL

## 2025-08-04 ENCOUNTER — HOME INFUSION (OUTPATIENT)
Dept: INFUSION THERAPY | Age: 65
End: 2025-08-04
Payer: COMMERCIAL

## 2025-08-04 NOTE — PROGRESS NOTES
Pt on Hizentra 24g sub-q weekly - orders valid through 5/13/26  Auth trough 7/17/26 August FOTM complete  Taay Kit in date    Pt requesting next  Fri 8/8. Per pt, she needs EMLA as well as extra tegaderm bandages with this delivery. No questions for Rph at this time.     Dispense 8x 10g vials and 4x 4g vials for 24g doses for 4 weeks  (2x 10gm +1 x4g = 24g dose_  1x EMLA tube  Infusions 8/9-9/5     Patient will  in office 8/8    FU 8/29 check next delivery date (~9/5)

## 2025-08-06 ENCOUNTER — HOSPITAL ENCOUNTER (OUTPATIENT)
Dept: RADIOLOGY | Facility: HOSPITAL | Age: 65
Discharge: HOME | End: 2025-08-06
Payer: COMMERCIAL

## 2025-08-06 ENCOUNTER — APPOINTMENT (OUTPATIENT)
Dept: SURGERY | Facility: CLINIC | Age: 65
End: 2025-08-06
Payer: COMMERCIAL

## 2025-08-06 VITALS
WEIGHT: 229 LBS | HEART RATE: 62 BPM | BODY MASS INDEX: 34.71 KG/M2 | SYSTOLIC BLOOD PRESSURE: 117 MMHG | HEIGHT: 68 IN | RESPIRATION RATE: 16 BRPM | DIASTOLIC BLOOD PRESSURE: 72 MMHG

## 2025-08-06 DIAGNOSIS — J98.6 DIAPHRAGM DYSFUNCTION: ICD-10-CM

## 2025-08-06 DIAGNOSIS — G25.81 RLS (RESTLESS LEGS SYNDROME): ICD-10-CM

## 2025-08-06 DIAGNOSIS — J98.6 DIAPHRAGM DYSFUNCTION: Primary | ICD-10-CM

## 2025-08-06 LAB
ALBUMIN SERPL-MCNC: 4.2 G/DL (ref 3.6–5.1)
ALP SERPL-CCNC: 68 U/L (ref 37–153)
ALT SERPL-CCNC: 15 U/L (ref 6–29)
ANION GAP SERPL CALCULATED.4IONS-SCNC: 11 MMOL/L (CALC) (ref 7–17)
AST SERPL-CCNC: 15 U/L (ref 10–35)
BASOPHILS # BLD AUTO: 33 CELLS/UL (ref 0–200)
BASOPHILS NFR BLD AUTO: 0.4 %
BILIRUB SERPL-MCNC: 0.4 MG/DL (ref 0.2–1.2)
BUN SERPL-MCNC: 13 MG/DL (ref 7–25)
CALCIUM SERPL-MCNC: 9 MG/DL (ref 8.6–10.4)
CHLORIDE SERPL-SCNC: 105 MMOL/L (ref 98–110)
CHOLEST SERPL-MCNC: 182 MG/DL
CHOLEST/HDLC SERPL: 4.1 (CALC)
CO2 SERPL-SCNC: 24 MMOL/L (ref 20–32)
CREAT SERPL-MCNC: 0.81 MG/DL (ref 0.5–1.05)
CRP SERPL-MCNC: <3 MG/L
EGFRCR SERPLBLD CKD-EPI 2021: 80 ML/MIN/1.73M2
EOSINOPHIL # BLD AUTO: 66 CELLS/UL (ref 15–500)
EOSINOPHIL NFR BLD AUTO: 0.8 %
ERYTHROCYTE [DISTWIDTH] IN BLOOD BY AUTOMATED COUNT: 14.4 % (ref 11–15)
ERYTHROCYTE [SEDIMENTATION RATE] IN BLOOD BY WESTERGREN METHOD: 19 MM/H
GLUCOSE SERPL-MCNC: 85 MG/DL (ref 65–99)
HCT VFR BLD AUTO: 38.8 % (ref 35–45)
HDLC SERPL-MCNC: 44 MG/DL
HGB BLD-MCNC: 12.8 G/DL (ref 11.7–15.5)
IGA SERPL-MCNC: 143 MG/DL (ref 70–320)
IGG SERPL-MCNC: 1268 MG/DL (ref 600–1540)
IGM SERPL-MCNC: 175 MG/DL (ref 50–300)
LDLC SERPL CALC-MCNC: 108 MG/DL (CALC)
LYMPHOCYTES # BLD AUTO: 2449 CELLS/UL (ref 850–3900)
LYMPHOCYTES NFR BLD AUTO: 29.5 %
MCH RBC QN AUTO: 29.7 PG (ref 27–33)
MCHC RBC AUTO-ENTMCNC: 33 G/DL (ref 32–36)
MCV RBC AUTO: 90 FL (ref 80–100)
MONOCYTES # BLD AUTO: 465 CELLS/UL (ref 200–950)
MONOCYTES NFR BLD AUTO: 5.6 %
NEUTROPHILS # BLD AUTO: 5287 CELLS/UL (ref 1500–7800)
NEUTROPHILS NFR BLD AUTO: 63.7 %
NONHDLC SERPL-MCNC: 138 MG/DL (CALC)
PLATELET # BLD AUTO: 274 THOUSAND/UL (ref 140–400)
PMV BLD REES-ECKER: 11 FL (ref 7.5–12.5)
POTASSIUM SERPL-SCNC: 4.3 MMOL/L (ref 3.5–5.3)
PROT SERPL-MCNC: 6.8 G/DL (ref 6.1–8.1)
RBC # BLD AUTO: 4.31 MILLION/UL (ref 3.8–5.1)
SODIUM SERPL-SCNC: 140 MMOL/L (ref 135–146)
TRIGL SERPL-MCNC: 183 MG/DL
WBC # BLD AUTO: 8.3 THOUSAND/UL (ref 3.8–10.8)

## 2025-08-06 PROCEDURE — 1126F AMNT PAIN NOTED NONE PRSNT: CPT | Performed by: NURSE PRACTITIONER

## 2025-08-06 PROCEDURE — 3008F BODY MASS INDEX DOCD: CPT | Performed by: NURSE PRACTITIONER

## 2025-08-06 PROCEDURE — 99214 OFFICE O/P EST MOD 30 MIN: CPT | Performed by: NURSE PRACTITIONER

## 2025-08-06 PROCEDURE — 3074F SYST BP LT 130 MM HG: CPT | Performed by: NURSE PRACTITIONER

## 2025-08-06 PROCEDURE — 1159F MED LIST DOCD IN RCRD: CPT | Performed by: NURSE PRACTITIONER

## 2025-08-06 PROCEDURE — 76000 FLUOROSCOPY <1 HR PHYS/QHP: CPT

## 2025-08-06 PROCEDURE — 71046 X-RAY EXAM CHEST 2 VIEWS: CPT

## 2025-08-06 PROCEDURE — 3078F DIAST BP <80 MM HG: CPT | Performed by: NURSE PRACTITIONER

## 2025-08-06 ASSESSMENT — PAIN SCALES - GENERAL: PAINLEVEL_OUTOF10: 0-NO PAIN

## 2025-08-06 NOTE — PROGRESS NOTES
Subjective   Patient ID: Lara Morris is a 65 y.o. female.    HPI Lara was implanted with DP 6/19/23. She did pace full time for about 1.5 years and stopped pacing in June. She recently saw Dr. Torres for hernia and is scheduled for OR 8/11/25 for both hernia repair and explant.   Lara initially had very good improvement of symptoms. After about 6 months she reached a plateau with improvement. We did continue to get EMG improvement until June.     Review of Systems    Objective   Physical Exam  Constitutional:       Appearance: She is obese.   Pulmonary:      Effort: Pulmonary effort is normal.   Abdominal:      Palpations: Abdomen is soft.      Comments: Wire site intact, no sign infection, supraumbilical hernia noted     Musculoskeletal:         General: Normal range of motion.     Skin:     General: Skin is warm and dry.     Neurological:      Mental Status: She is alert and oriented to person, place, and time.     Psychiatric:         Behavior: Behavior normal.         Assessment/Plan   There are no diagnoses linked to this encounter.  65 year old female with right DD and hernia.   We repeated CXR, SNIFF and EMG today. The CXR and SNIFF similar to prior exams. There may be slightly more movement of right HD laterally.   EMG today is much better compared to baseline and even Nov 2024 recordings. It is similar to June. She does have at rest breathing, minimal right side activity.   We reviewed explant. She has recent EKG and blood work done yesterday. She has had no change in breathing since no pacing in June.   I did review EMG and recommended she do active deep breathing daily. She has good activity with deep breaths. She was instructed to NOT take her usual Sunday GLP1.

## 2025-08-08 ENCOUNTER — APPOINTMENT (OUTPATIENT)
Dept: RHEUMATOLOGY | Facility: CLINIC | Age: 65
End: 2025-08-08
Payer: COMMERCIAL

## 2025-08-08 VITALS
HEIGHT: 68 IN | TEMPERATURE: 97.9 F | HEART RATE: 61 BPM | BODY MASS INDEX: 34.71 KG/M2 | OXYGEN SATURATION: 97 % | SYSTOLIC BLOOD PRESSURE: 91 MMHG | WEIGHT: 229 LBS | DIASTOLIC BLOOD PRESSURE: 52 MMHG

## 2025-08-08 DIAGNOSIS — M70.62 GREATER TROCHANTERIC BURSITIS OF LEFT HIP: ICD-10-CM

## 2025-08-08 DIAGNOSIS — M79.7 FIBROMYALGIA: Primary | ICD-10-CM

## 2025-08-08 PROCEDURE — 1160F RVW MEDS BY RX/DR IN RCRD: CPT | Performed by: INTERNAL MEDICINE

## 2025-08-08 PROCEDURE — 99214 OFFICE O/P EST MOD 30 MIN: CPT | Performed by: INTERNAL MEDICINE

## 2025-08-08 PROCEDURE — 1159F MED LIST DOCD IN RCRD: CPT | Performed by: INTERNAL MEDICINE

## 2025-08-08 PROCEDURE — 3074F SYST BP LT 130 MM HG: CPT | Performed by: INTERNAL MEDICINE

## 2025-08-08 PROCEDURE — 3008F BODY MASS INDEX DOCD: CPT | Performed by: INTERNAL MEDICINE

## 2025-08-08 PROCEDURE — 3078F DIAST BP <80 MM HG: CPT | Performed by: INTERNAL MEDICINE

## 2025-08-10 NOTE — PROGRESS NOTES
She presents for follow-up evaluation with complaints of pain in the upper back as well as pain in the left hip.  The left hip pain extends from the posterior lateral thigh down the leg into the calf.  The pain is worse with lying on her left side.  She also has pain in the right hip.  She is planning to have diaphragmatic pacer removed as well as ventral hernia repair 8/11/2025.  She is considering possible right hip replacement surgery in the future.  She stopped duloxetine due to excessive mouth dryness.  She stopped taking ibuprofen in preparation for the planned surgery (8/11/2025).    Examination shows tenderness in the supraspinatus muscle region bilaterally and moderate focal tenderness overlying the left greater trochanter.  There is pain in the lateral aspect of the left hip and left thigh with adduction of the left hip.  There are no joint effusions.    Chest x-ray (8/6/2025) stable elevation of the right hemidiaphragm.  X-ray hip and pelvis (2/18/2025) mild to moderate right and mild left hip osteoarthritis.    Laboratory (8/5/2025) IgA 143, IgG 1268, IgM 175, CRP <3.0, ESR 19, BUN 13, creatinine 0.81, glucose 85, calcium 9.0, albumin 4.2, alkaline phosphatase 68, AST 15, ALT 15, WBC 8.3, hemoglobin 12.8, hematocrit 38.8, MCV 90.0, MCHC 33.0, platelets 274.    She has hypothyroidism, left hemidiaphragmatic paralysis, cervical and lumbar spondylosis, common variable immunodeficiency disorder with normal immunoglobulin levels currently, left greater trochanteric bursitis, aortic stenosis, obstructive sleep apnea, history of flexor tendinitis of the third digit of the right hand, and fibromyalgia.    She was given a local injection of triamcinolone 20 mg with 1 mL of 1% lidocaine to the region of tenderness in the supraspinatus muscles bilaterally.  She is to do stretching exercises to the iliotibial band bilaterally.  She is to return at the previously scheduled follow-up appointment.

## 2025-08-11 ENCOUNTER — ANESTHESIA (OUTPATIENT)
Dept: OPERATING ROOM | Facility: HOSPITAL | Age: 65
End: 2025-08-11
Payer: COMMERCIAL

## 2025-08-11 ENCOUNTER — HOSPITAL ENCOUNTER (OUTPATIENT)
Facility: HOSPITAL | Age: 65
Setting detail: OUTPATIENT SURGERY
Discharge: HOME | End: 2025-08-11
Attending: SURGERY | Admitting: SURGERY
Payer: COMMERCIAL

## 2025-08-11 ENCOUNTER — ANESTHESIA EVENT (OUTPATIENT)
Dept: OPERATING ROOM | Facility: HOSPITAL | Age: 65
End: 2025-08-11
Payer: COMMERCIAL

## 2025-08-11 VITALS
RESPIRATION RATE: 16 BRPM | SYSTOLIC BLOOD PRESSURE: 149 MMHG | WEIGHT: 224.65 LBS | TEMPERATURE: 96.8 F | HEART RATE: 66 BPM | OXYGEN SATURATION: 97 % | DIASTOLIC BLOOD PRESSURE: 70 MMHG | BODY MASS INDEX: 34.16 KG/M2

## 2025-08-11 DIAGNOSIS — J98.6 DIAPHRAGM DYSFUNCTION: ICD-10-CM

## 2025-08-11 DIAGNOSIS — K43.2 INCISIONAL HERNIA, WITHOUT OBSTRUCTION OR GANGRENE: Primary | ICD-10-CM

## 2025-08-11 LAB
GLUCOSE BLD MANUAL STRIP-MCNC: 129 MG/DL (ref 74–99)
GLUCOSE BLD MANUAL STRIP-MCNC: 89 MG/DL (ref 74–99)

## 2025-08-11 PROCEDURE — 2500000004 HC RX 250 GENERAL PHARMACY W/ HCPCS (ALT 636 FOR OP/ED): Performed by: ANESTHESIOLOGY

## 2025-08-11 PROCEDURE — 2720000007 HC OR 272 NO HCPCS: Performed by: SURGERY

## 2025-08-11 PROCEDURE — 2500000005 HC RX 250 GENERAL PHARMACY W/O HCPCS: Performed by: SURGERY

## 2025-08-11 PROCEDURE — 96372 THER/PROPH/DIAG INJ SC/IM: CPT

## 2025-08-11 PROCEDURE — 7100000001 HC RECOVERY ROOM TIME - INITIAL BASE CHARGE: Performed by: SURGERY

## 2025-08-11 PROCEDURE — A49614 PR RPR AA HERNIA RECR < 3 CM NCRC8/STRANGULATED: Performed by: NURSE ANESTHETIST, CERTIFIED REGISTERED

## 2025-08-11 PROCEDURE — 3700000001 HC GENERAL ANESTHESIA TIME - INITIAL BASE CHARGE: Performed by: SURGERY

## 2025-08-11 PROCEDURE — 3700000002 HC GENERAL ANESTHESIA TIME - EACH INCREMENTAL 1 MINUTE: Performed by: SURGERY

## 2025-08-11 PROCEDURE — 7100000010 HC PHASE TWO TIME - EACH INCREMENTAL 1 MINUTE: Performed by: SURGERY

## 2025-08-11 PROCEDURE — 7100000009 HC PHASE TWO TIME - INITIAL BASE CHARGE: Performed by: SURGERY

## 2025-08-11 PROCEDURE — 2500000004 HC RX 250 GENERAL PHARMACY W/ HCPCS (ALT 636 FOR OP/ED)

## 2025-08-11 PROCEDURE — 82947 ASSAY GLUCOSE BLOOD QUANT: CPT

## 2025-08-11 PROCEDURE — 2500000005 HC RX 250 GENERAL PHARMACY W/O HCPCS

## 2025-08-11 PROCEDURE — 7100000002 HC RECOVERY ROOM TIME - EACH INCREMENTAL 1 MINUTE: Performed by: SURGERY

## 2025-08-11 PROCEDURE — A49614 PR RPR AA HERNIA RECR < 3 CM NCRC8/STRANGULATED: Performed by: ANESTHESIOLOGY

## 2025-08-11 PROCEDURE — 3600000004 HC OR TIME - INITIAL BASE CHARGE - PROCEDURE LEVEL FOUR: Performed by: SURGERY

## 2025-08-11 PROCEDURE — 3600000009 HC OR TIME - EACH INCREMENTAL 1 MINUTE - PROCEDURE LEVEL FOUR: Performed by: SURGERY

## 2025-08-11 PROCEDURE — 49614 RPR AA HRN RCR < 3 NCR/STRN: CPT | Performed by: SURGERY

## 2025-08-11 PROCEDURE — 2500000002 HC RX 250 W HCPCS SELF ADMINISTERED DRUGS (ALT 637 FOR MEDICARE OP, ALT 636 FOR OP/ED): Performed by: ANESTHESIOLOGY

## 2025-08-11 RX ORDER — LIDOCAINE HYDROCHLORIDE 40 MG/ML
SOLUTION TOPICAL AS NEEDED
Status: DISCONTINUED | OUTPATIENT
Start: 2025-08-11 | End: 2025-08-11

## 2025-08-11 RX ORDER — APREPITANT 40 MG/1
40 CAPSULE ORAL ONCE
Status: COMPLETED | OUTPATIENT
Start: 2025-08-11 | End: 2025-08-11

## 2025-08-11 RX ORDER — OXYCODONE HYDROCHLORIDE 5 MG/1
5 TABLET ORAL EVERY 6 HOURS PRN
Qty: 8 TABLET | Refills: 0 | Status: SHIPPED | OUTPATIENT
Start: 2025-08-11 | End: 2025-08-11

## 2025-08-11 RX ORDER — FENTANYL CITRATE 50 UG/ML
INJECTION, SOLUTION INTRAMUSCULAR; INTRAVENOUS AS NEEDED
Status: DISCONTINUED | OUTPATIENT
Start: 2025-08-11 | End: 2025-08-11

## 2025-08-11 RX ORDER — ALBUTEROL SULFATE 0.83 MG/ML
2.5 SOLUTION RESPIRATORY (INHALATION) ONCE AS NEEDED
Status: DISCONTINUED | OUTPATIENT
Start: 2025-08-11 | End: 2025-08-11

## 2025-08-11 RX ORDER — CEFAZOLIN 1 G/1
INJECTION, POWDER, FOR SOLUTION INTRAVENOUS AS NEEDED
Status: DISCONTINUED | OUTPATIENT
Start: 2025-08-11 | End: 2025-08-11

## 2025-08-11 RX ORDER — DOCUSATE SODIUM 100 MG/1
100 CAPSULE, LIQUID FILLED ORAL 2 TIMES DAILY
Qty: 28 CAPSULE | Refills: 0 | Status: SHIPPED | OUTPATIENT
Start: 2025-08-11 | End: 2025-08-11

## 2025-08-11 RX ORDER — DROPERIDOL 2.5 MG/ML
INJECTION, SOLUTION INTRAMUSCULAR; INTRAVENOUS AS NEEDED
Status: DISCONTINUED | OUTPATIENT
Start: 2025-08-11 | End: 2025-08-11

## 2025-08-11 RX ORDER — DROPERIDOL 2.5 MG/ML
0.62 INJECTION, SOLUTION INTRAMUSCULAR; INTRAVENOUS ONCE AS NEEDED
Status: DISCONTINUED | OUTPATIENT
Start: 2025-08-11 | End: 2025-08-11 | Stop reason: HOSPADM

## 2025-08-11 RX ORDER — METHOCARBAMOL 100 MG/ML
1000 INJECTION, SOLUTION INTRAMUSCULAR; INTRAVENOUS ONCE
Status: DISCONTINUED | OUTPATIENT
Start: 2025-08-11 | End: 2025-08-11

## 2025-08-11 RX ORDER — MEPERIDINE HYDROCHLORIDE 25 MG/ML
12.5 INJECTION INTRAMUSCULAR; INTRAVENOUS; SUBCUTANEOUS EVERY 10 MIN PRN
Status: DISCONTINUED | OUTPATIENT
Start: 2025-08-11 | End: 2025-08-11

## 2025-08-11 RX ORDER — LIDOCAINE HYDROCHLORIDE 10 MG/ML
0.1 INJECTION, SOLUTION EPIDURAL; INFILTRATION; INTRACAUDAL; PERINEURAL ONCE
Status: DISCONTINUED | OUTPATIENT
Start: 2025-08-11 | End: 2025-08-11 | Stop reason: HOSPADM

## 2025-08-11 RX ORDER — KETOROLAC TROMETHAMINE 30 MG/ML
15 INJECTION, SOLUTION INTRAMUSCULAR; INTRAVENOUS ONCE AS NEEDED
Status: DISCONTINUED | OUTPATIENT
Start: 2025-08-11 | End: 2025-08-11 | Stop reason: HOSPADM

## 2025-08-11 RX ORDER — DROPERIDOL 2.5 MG/ML
0.62 INJECTION, SOLUTION INTRAMUSCULAR; INTRAVENOUS ONCE AS NEEDED
Status: DISCONTINUED | OUTPATIENT
Start: 2025-08-11 | End: 2025-08-11

## 2025-08-11 RX ORDER — MIDAZOLAM HYDROCHLORIDE 2 MG/2ML
1 INJECTION, SOLUTION INTRAMUSCULAR; INTRAVENOUS ONCE AS NEEDED
Status: DISCONTINUED | OUTPATIENT
Start: 2025-08-11 | End: 2025-08-11 | Stop reason: HOSPADM

## 2025-08-11 RX ORDER — MEPERIDINE HYDROCHLORIDE 25 MG/ML
12.5 INJECTION INTRAMUSCULAR; INTRAVENOUS; SUBCUTANEOUS EVERY 10 MIN PRN
Status: DISCONTINUED | OUTPATIENT
Start: 2025-08-11 | End: 2025-08-11 | Stop reason: HOSPADM

## 2025-08-11 RX ORDER — SODIUM CHLORIDE, SODIUM LACTATE, POTASSIUM CHLORIDE, CALCIUM CHLORIDE 600; 310; 30; 20 MG/100ML; MG/100ML; MG/100ML; MG/100ML
100 INJECTION, SOLUTION INTRAVENOUS CONTINUOUS
Status: DISCONTINUED | OUTPATIENT
Start: 2025-08-11 | End: 2025-08-11

## 2025-08-11 RX ORDER — HYDROMORPHONE HYDROCHLORIDE 0.2 MG/ML
0.2 INJECTION INTRAMUSCULAR; INTRAVENOUS; SUBCUTANEOUS EVERY 5 MIN PRN
Status: DISCONTINUED | OUTPATIENT
Start: 2025-08-11 | End: 2025-08-11 | Stop reason: HOSPADM

## 2025-08-11 RX ORDER — DOCUSATE SODIUM 100 MG/1
100 CAPSULE, LIQUID FILLED ORAL 2 TIMES DAILY
Qty: 28 CAPSULE | Refills: 0 | Status: SHIPPED | OUTPATIENT
Start: 2025-08-11

## 2025-08-11 RX ORDER — LABETALOL HYDROCHLORIDE 5 MG/ML
5 INJECTION, SOLUTION INTRAVENOUS ONCE AS NEEDED
Status: DISCONTINUED | OUTPATIENT
Start: 2025-08-11 | End: 2025-08-11 | Stop reason: HOSPADM

## 2025-08-11 RX ORDER — LIDOCAINE HYDROCHLORIDE 20 MG/ML
INJECTION, SOLUTION INFILTRATION; PERINEURAL AS NEEDED
Status: DISCONTINUED | OUTPATIENT
Start: 2025-08-11 | End: 2025-08-11

## 2025-08-11 RX ORDER — LIDOCAINE HYDROCHLORIDE 10 MG/ML
0.1 INJECTION, SOLUTION EPIDURAL; INFILTRATION; INTRACAUDAL; PERINEURAL ONCE
Status: DISCONTINUED | OUTPATIENT
Start: 2025-08-11 | End: 2025-08-11

## 2025-08-11 RX ORDER — SODIUM CHLORIDE, SODIUM LACTATE, POTASSIUM CHLORIDE, CALCIUM CHLORIDE 600; 310; 30; 20 MG/100ML; MG/100ML; MG/100ML; MG/100ML
INJECTION, SOLUTION INTRAVENOUS CONTINUOUS PRN
Status: DISCONTINUED | OUTPATIENT
Start: 2025-08-11 | End: 2025-08-11

## 2025-08-11 RX ORDER — ALBUTEROL SULFATE 0.83 MG/ML
2.5 SOLUTION RESPIRATORY (INHALATION) ONCE AS NEEDED
Status: DISCONTINUED | OUTPATIENT
Start: 2025-08-11 | End: 2025-08-11 | Stop reason: HOSPADM

## 2025-08-11 RX ORDER — HYDROMORPHONE HYDROCHLORIDE 0.2 MG/ML
0.2 INJECTION INTRAMUSCULAR; INTRAVENOUS; SUBCUTANEOUS EVERY 5 MIN PRN
Status: DISCONTINUED | OUTPATIENT
Start: 2025-08-11 | End: 2025-08-11

## 2025-08-11 RX ORDER — ACETAMINOPHEN 325 MG/1
650 TABLET ORAL EVERY 4 HOURS PRN
Status: DISCONTINUED | OUTPATIENT
Start: 2025-08-11 | End: 2025-08-11 | Stop reason: HOSPADM

## 2025-08-11 RX ORDER — MIDAZOLAM HYDROCHLORIDE 2 MG/2ML
INJECTION, SOLUTION INTRAMUSCULAR; INTRAVENOUS AS NEEDED
Status: DISCONTINUED | OUTPATIENT
Start: 2025-08-11 | End: 2025-08-11

## 2025-08-11 RX ORDER — MIDAZOLAM HYDROCHLORIDE 2 MG/2ML
1 INJECTION, SOLUTION INTRAMUSCULAR; INTRAVENOUS ONCE AS NEEDED
Status: DISCONTINUED | OUTPATIENT
Start: 2025-08-11 | End: 2025-08-11

## 2025-08-11 RX ORDER — LABETALOL HYDROCHLORIDE 5 MG/ML
5 INJECTION, SOLUTION INTRAVENOUS ONCE AS NEEDED
Status: DISCONTINUED | OUTPATIENT
Start: 2025-08-11 | End: 2025-08-11

## 2025-08-11 RX ORDER — SODIUM CHLORIDE, SODIUM LACTATE, POTASSIUM CHLORIDE, CALCIUM CHLORIDE 600; 310; 30; 20 MG/100ML; MG/100ML; MG/100ML; MG/100ML
100 INJECTION, SOLUTION INTRAVENOUS CONTINUOUS
Status: ACTIVE | OUTPATIENT
Start: 2025-08-11 | End: 2025-08-11

## 2025-08-11 RX ORDER — PROPOFOL 10 MG/ML
INJECTION, EMULSION INTRAVENOUS AS NEEDED
Status: DISCONTINUED | OUTPATIENT
Start: 2025-08-11 | End: 2025-08-11

## 2025-08-11 RX ORDER — METHOCARBAMOL 100 MG/ML
1000 INJECTION, SOLUTION INTRAMUSCULAR; INTRAVENOUS ONCE
Status: COMPLETED | OUTPATIENT
Start: 2025-08-11 | End: 2025-08-11

## 2025-08-11 RX ORDER — OXYCODONE HYDROCHLORIDE 5 MG/1
5 TABLET ORAL EVERY 6 HOURS PRN
Qty: 8 TABLET | Refills: 0 | Status: SHIPPED | OUTPATIENT
Start: 2025-08-11

## 2025-08-11 RX ORDER — ACETAMINOPHEN 325 MG/1
650 TABLET ORAL EVERY 4 HOURS PRN
Status: DISCONTINUED | OUTPATIENT
Start: 2025-08-11 | End: 2025-08-11

## 2025-08-11 RX ORDER — BUPIVACAINE HCL/EPINEPHRINE 0.5-1:200K
VIAL (ML) INJECTION AS NEEDED
Status: DISCONTINUED | OUTPATIENT
Start: 2025-08-11 | End: 2025-08-11 | Stop reason: HOSPADM

## 2025-08-11 RX ORDER — FENTANYL CITRATE 50 UG/ML
25 INJECTION, SOLUTION INTRAMUSCULAR; INTRAVENOUS EVERY 5 MIN PRN
Status: DISCONTINUED | OUTPATIENT
Start: 2025-08-11 | End: 2025-08-11 | Stop reason: HOSPADM

## 2025-08-11 RX ORDER — FENTANYL CITRATE 50 UG/ML
50 INJECTION, SOLUTION INTRAMUSCULAR; INTRAVENOUS EVERY 5 MIN PRN
Status: DISCONTINUED | OUTPATIENT
Start: 2025-08-11 | End: 2025-08-11 | Stop reason: HOSPADM

## 2025-08-11 RX ORDER — WATER 1 ML/ML
INJECTION IRRIGATION AS NEEDED
Status: DISCONTINUED | OUTPATIENT
Start: 2025-08-11 | End: 2025-08-11 | Stop reason: HOSPADM

## 2025-08-11 RX ADMIN — HYDROMORPHONE HYDROCHLORIDE 0.5 MG: 1 INJECTION, SOLUTION INTRAMUSCULAR; INTRAVENOUS; SUBCUTANEOUS at 10:18

## 2025-08-11 RX ADMIN — PROPOFOL 200 MG: 10 INJECTION, EMULSION INTRAVENOUS at 08:45

## 2025-08-11 RX ADMIN — LIDOCAINE HYDROCHLORIDE 50 MG: 20 INJECTION, SOLUTION INFILTRATION; PERINEURAL at 08:45

## 2025-08-11 RX ADMIN — METHOCARBAMOL 1000 MG: 100 INJECTION INTRAMUSCULAR; INTRAVENOUS at 09:55

## 2025-08-11 RX ADMIN — SODIUM CHLORIDE, POTASSIUM CHLORIDE, SODIUM LACTATE AND CALCIUM CHLORIDE: 600; 310; 30; 20 INJECTION, SOLUTION INTRAVENOUS at 08:40

## 2025-08-11 RX ADMIN — FENTANYL CITRATE 50 MCG: 50 INJECTION, SOLUTION INTRAMUSCULAR; INTRAVENOUS at 09:45

## 2025-08-11 RX ADMIN — HYDROMORPHONE HYDROCHLORIDE 0.2 MG: 0.2 INJECTION, SOLUTION INTRAMUSCULAR; INTRAVENOUS; SUBCUTANEOUS at 10:02

## 2025-08-11 RX ADMIN — DROPERIDOL 0.62 MG: 2.5 INJECTION, SOLUTION INTRAMUSCULAR; INTRAVENOUS at 08:49

## 2025-08-11 RX ADMIN — CEFAZOLIN 2 G: 1 INJECTION, POWDER, FOR SOLUTION INTRAMUSCULAR; INTRAVENOUS at 08:48

## 2025-08-11 RX ADMIN — Medication 30 MG: at 08:45

## 2025-08-11 RX ADMIN — DEXAMETHASONE SODIUM PHOSPHATE 8 MG: 4 INJECTION INTRA-ARTICULAR; INTRALESIONAL; INTRAMUSCULAR; INTRAVENOUS; SOFT TISSUE at 08:49

## 2025-08-11 RX ADMIN — MIDAZOLAM HYDROCHLORIDE 2 MG: 2 INJECTION, SOLUTION INTRAMUSCULAR; INTRAVENOUS at 08:40

## 2025-08-11 RX ADMIN — FENTANYL CITRATE 50 MCG: 50 INJECTION, SOLUTION INTRAMUSCULAR; INTRAVENOUS at 08:45

## 2025-08-11 RX ADMIN — LIDOCAINE HYDROCHLORIDE 4 ML: 40 SOLUTION TOPICAL at 08:47

## 2025-08-11 RX ADMIN — HYDROMORPHONE HYDROCHLORIDE 0.5 MG: 1 INJECTION, SOLUTION INTRAMUSCULAR; INTRAVENOUS; SUBCUTANEOUS at 10:48

## 2025-08-11 RX ADMIN — APREPITANT 40 MG: 40 CAPSULE ORAL at 07:45

## 2025-08-11 ASSESSMENT — PAIN - FUNCTIONAL ASSESSMENT
PAIN_FUNCTIONAL_ASSESSMENT: 0-10
PAIN_FUNCTIONAL_ASSESSMENT: UNABLE TO SELF-REPORT
PAIN_FUNCTIONAL_ASSESSMENT: 0-10

## 2025-08-11 ASSESSMENT — PAIN DESCRIPTION - DESCRIPTORS: DESCRIPTORS: PRESSURE

## 2025-08-11 ASSESSMENT — PAIN DESCRIPTION - LOCATION: LOCATION: OTHER (COMMENT)

## 2025-08-11 ASSESSMENT — PAIN SCALES - GENERAL
PAINLEVEL_OUTOF10: 8
PAINLEVEL_OUTOF10: 6
PAINLEVEL_OUTOF10: 6
PAINLEVEL_OUTOF10: 0 - NO PAIN
PAINLEVEL_OUTOF10: 7
PAINLEVEL_OUTOF10: 5 - MODERATE PAIN
PAINLEVEL_OUTOF10: 10 - WORST POSSIBLE PAIN
PAINLEVEL_OUTOF10: 5 - MODERATE PAIN
PAIN_LEVEL: 2

## 2025-08-11 ASSESSMENT — COLUMBIA-SUICIDE SEVERITY RATING SCALE - C-SSRS
1. IN THE PAST MONTH, HAVE YOU WISHED YOU WERE DEAD OR WISHED YOU COULD GO TO SLEEP AND NOT WAKE UP?: NO
6. HAVE YOU EVER DONE ANYTHING, STARTED TO DO ANYTHING, OR PREPARED TO DO ANYTHING TO END YOUR LIFE?: NO
2. HAVE YOU ACTUALLY HAD ANY THOUGHTS OF KILLING YOURSELF?: NO

## 2025-08-12 PROCEDURE — RXMED WILLOW AMBULATORY MEDICATION CHARGE

## 2025-08-12 RX ORDER — GABAPENTIN 300 MG/1
600 CAPSULE ORAL NIGHTLY
Qty: 180 CAPSULE | Refills: 0 | Status: SHIPPED | OUTPATIENT
Start: 2025-08-12 | End: 2025-11-13

## 2025-08-15 ENCOUNTER — PHARMACY VISIT (OUTPATIENT)
Dept: PHARMACY | Facility: CLINIC | Age: 65
End: 2025-08-15
Payer: COMMERCIAL

## 2025-08-18 DIAGNOSIS — E83.10 DISORDER OF IRON METABOLISM: ICD-10-CM

## 2025-08-19 ENCOUNTER — SPECIALTY PHARMACY (OUTPATIENT)
Dept: PHARMACY | Facility: CLINIC | Age: 65
End: 2025-08-19

## 2025-08-19 PROCEDURE — RXMED WILLOW AMBULATORY MEDICATION CHARGE

## 2025-08-20 DIAGNOSIS — M19.90 ARTHRITIS: ICD-10-CM

## 2025-08-20 RX ORDER — GABAPENTIN 100 MG/1
100 CAPSULE ORAL DAILY
Qty: 90 CAPSULE | Refills: 1 | Status: SHIPPED | OUTPATIENT
Start: 2025-08-20 | End: 2026-08-20

## 2025-08-25 ENCOUNTER — PHARMACY VISIT (OUTPATIENT)
Dept: PHARMACY | Facility: CLINIC | Age: 65
End: 2025-08-25
Payer: COMMERCIAL

## 2025-08-25 PROCEDURE — RXMED WILLOW AMBULATORY MEDICATION CHARGE

## 2025-08-26 ENCOUNTER — PHARMACY VISIT (OUTPATIENT)
Dept: PHARMACY | Facility: CLINIC | Age: 65
End: 2025-08-26
Payer: COMMERCIAL

## 2025-08-26 ENCOUNTER — PHARMACY VISIT (OUTPATIENT)
Dept: PHARMACY | Facility: CLINIC | Age: 65
End: 2025-08-26

## 2025-08-27 ENCOUNTER — APPOINTMENT (OUTPATIENT)
Dept: SLEEP MEDICINE | Facility: CLINIC | Age: 65
End: 2025-08-27
Payer: COMMERCIAL

## 2025-08-27 VITALS
BODY MASS INDEX: 34.4 KG/M2 | DIASTOLIC BLOOD PRESSURE: 74 MMHG | HEIGHT: 68 IN | SYSTOLIC BLOOD PRESSURE: 116 MMHG | TEMPERATURE: 97.7 F | WEIGHT: 227 LBS | HEART RATE: 66 BPM

## 2025-08-27 DIAGNOSIS — G47.33 OSA (OBSTRUCTIVE SLEEP APNEA): ICD-10-CM

## 2025-08-27 DIAGNOSIS — G25.81 RLS (RESTLESS LEGS SYNDROME): Primary | ICD-10-CM

## 2025-08-27 DIAGNOSIS — E66.01 CLASS 2 SEVERE OBESITY DUE TO EXCESS CALORIES WITH SERIOUS COMORBIDITY AND BODY MASS INDEX (BMI) OF 39.0 TO 39.9 IN ADULT: ICD-10-CM

## 2025-08-27 DIAGNOSIS — E66.812 CLASS 2 SEVERE OBESITY DUE TO EXCESS CALORIES WITH SERIOUS COMORBIDITY AND BODY MASS INDEX (BMI) OF 39.0 TO 39.9 IN ADULT: ICD-10-CM

## 2025-08-27 DIAGNOSIS — E61.1 IRON DEFICIENCY: ICD-10-CM

## 2025-08-27 PROCEDURE — 3008F BODY MASS INDEX DOCD: CPT | Performed by: INTERNAL MEDICINE

## 2025-08-27 PROCEDURE — 99214 OFFICE O/P EST MOD 30 MIN: CPT | Performed by: INTERNAL MEDICINE

## 2025-08-27 PROCEDURE — 1036F TOBACCO NON-USER: CPT | Performed by: INTERNAL MEDICINE

## 2025-08-27 PROCEDURE — 3074F SYST BP LT 130 MM HG: CPT | Performed by: INTERNAL MEDICINE

## 2025-08-27 PROCEDURE — 3078F DIAST BP <80 MM HG: CPT | Performed by: INTERNAL MEDICINE

## 2025-08-29 ENCOUNTER — SPECIALTY PHARMACY (OUTPATIENT)
Dept: PHARMACY | Facility: CLINIC | Age: 65
End: 2025-08-29

## 2025-09-04 LAB
FERRITIN SERPL-MCNC: 133 NG/ML (ref 16–288)
IRON SATN MFR SERPL: 15 % (CALC) (ref 16–45)
IRON SERPL-MCNC: 48 MCG/DL (ref 45–160)
TIBC SERPL-MCNC: 325 MCG/DL (CALC) (ref 250–450)

## 2025-09-09 ENCOUNTER — APPOINTMENT (OUTPATIENT)
Dept: ORTHOPEDIC SURGERY | Facility: CLINIC | Age: 65
End: 2025-09-09
Payer: COMMERCIAL

## 2025-10-09 ENCOUNTER — APPOINTMENT (OUTPATIENT)
Dept: PRIMARY CARE | Facility: CLINIC | Age: 65
End: 2025-10-09
Payer: COMMERCIAL

## 2025-10-13 ENCOUNTER — APPOINTMENT (OUTPATIENT)
Dept: NEUROLOGY | Facility: CLINIC | Age: 65
End: 2025-10-13
Payer: COMMERCIAL

## 2025-11-11 ENCOUNTER — APPOINTMENT (OUTPATIENT)
Dept: ORTHOPEDIC SURGERY | Facility: CLINIC | Age: 65
End: 2025-11-11
Payer: COMMERCIAL

## 2025-12-16 ENCOUNTER — APPOINTMENT (OUTPATIENT)
Dept: RHEUMATOLOGY | Facility: CLINIC | Age: 65
End: 2025-12-16
Payer: COMMERCIAL

## 2026-01-13 ENCOUNTER — APPOINTMENT (OUTPATIENT)
Dept: ORTHOPEDIC SURGERY | Facility: CLINIC | Age: 66
End: 2026-01-13
Payer: COMMERCIAL

## 2026-03-04 ENCOUNTER — APPOINTMENT (OUTPATIENT)
Dept: SLEEP MEDICINE | Facility: CLINIC | Age: 66
End: 2026-03-04
Payer: COMMERCIAL

## 2026-03-06 ENCOUNTER — APPOINTMENT (OUTPATIENT)
Dept: RHEUMATOLOGY | Facility: CLINIC | Age: 66
End: 2026-03-06
Payer: COMMERCIAL

## (undated) DEVICE — Device

## (undated) DEVICE — COVER, CART, 45 X 27 X 48 IN, CLEAR

## (undated) DEVICE — NEEDLE, INSUFFLATION, 14 G, 100 MM

## (undated) DEVICE — ENDO, PORT VERSASTEP 5MM

## (undated) DEVICE — TROCAR, BLUNTPORT,  W/THREADED ANCHOR, 12MM, LF

## (undated) DEVICE — SUTURE, VICRYL, 4-0, 18 IN, UNDYED BR PS-2

## (undated) DEVICE — TOWEL, SURGICAL, NEURO, O/R, 16 X 26, BLUE, STERILE

## (undated) DEVICE — ADHESIVE, SKIN, DERMABOND ADVANCED, 15CM, PEN-STYLE

## (undated) DEVICE — PAD, GROUNDING, ELECTROSURGICAL, W/9 FT CABLE, POLYHESIVE II, ADULT, LF

## (undated) DEVICE — DEVICE, SUTURE, ENDOCLOSE, TROCAR

## (undated) DEVICE — MANIFOLD, 4 PORT NEPTUNE STANDARD

## (undated) DEVICE — APPLICATOR, CHLORAPREP, W/ORANGE TINT, 26ML

## (undated) DEVICE — SUTURE, VICRYL, 0, 27 IN, UR-6, VIOLET

## (undated) DEVICE — SUTURE, PROLENE, 2-0, 30 IN, SH, BLUE

## (undated) DEVICE — TUBE SET, PNEUMOCLEAR, SMOKE EVACU, HIGH-FLOW

## (undated) DEVICE — REST, HEAD, BAGEL, 9 IN

## (undated) DEVICE — SCISSORS, EPIX, LAPOROSCOPIC, 5MM X 35CM

## (undated) DEVICE — COVER, BACK TABLE, 65 X 90, HVY REINFORCED